# Patient Record
Sex: MALE | Race: WHITE | NOT HISPANIC OR LATINO | Employment: OTHER | URBAN - METROPOLITAN AREA
[De-identification: names, ages, dates, MRNs, and addresses within clinical notes are randomized per-mention and may not be internally consistent; named-entity substitution may affect disease eponyms.]

---

## 2017-01-12 ENCOUNTER — ALLSCRIPTS OFFICE VISIT (OUTPATIENT)
Dept: OTHER | Facility: OTHER | Age: 77
End: 2017-01-12

## 2017-01-12 DIAGNOSIS — J10.1 INFLUENZA DUE TO OTHER IDENTIFIED INFLUENZA VIRUS WITH OTHER RESPIRATORY MANIFESTATIONS: ICD-10-CM

## 2017-01-12 LAB
FLUAV AG SPEC QL IA: POSITIVE
INFLUENZA B AG (HISTORICAL): NEGATIVE

## 2017-01-13 ENCOUNTER — TRANSCRIBE ORDERS (OUTPATIENT)
Dept: RADIOLOGY | Facility: HOSPITAL | Age: 77
End: 2017-01-13

## 2017-01-13 ENCOUNTER — GENERIC CONVERSION - ENCOUNTER (OUTPATIENT)
Dept: OTHER | Facility: OTHER | Age: 77
End: 2017-01-13

## 2017-01-13 ENCOUNTER — ALLSCRIPTS OFFICE VISIT (OUTPATIENT)
Dept: OTHER | Facility: OTHER | Age: 77
End: 2017-01-13

## 2017-01-13 ENCOUNTER — HOSPITAL ENCOUNTER (OUTPATIENT)
Dept: RADIOLOGY | Facility: HOSPITAL | Age: 77
Discharge: HOME | End: 2017-01-13
Payer: COMMERCIAL

## 2017-01-13 DIAGNOSIS — J10.1 INFLUENZA DUE TO OTHER IDENTIFIED INFLUENZA VIRUS WITH OTHER RESPIRATORY MANIFESTATIONS: ICD-10-CM

## 2017-01-13 PROCEDURE — 71020 HB CHEST X-RAY 2VW FRONTAL&LATL: CPT

## 2017-02-02 ENCOUNTER — ALLSCRIPTS OFFICE VISIT (OUTPATIENT)
Dept: OTHER | Facility: OTHER | Age: 77
End: 2017-02-02

## 2017-02-16 ENCOUNTER — GENERIC CONVERSION - ENCOUNTER (OUTPATIENT)
Dept: OTHER | Facility: OTHER | Age: 77
End: 2017-02-16

## 2017-02-16 LAB
LEFT EYE DIABETIC RETINOPATHY: NORMAL
RIGHT EYE DIABETIC RETINOPATHY: NORMAL

## 2017-03-25 ENCOUNTER — APPOINTMENT (EMERGENCY)
Dept: RADIOLOGY | Facility: HOSPITAL | Age: 77
DRG: 041 | End: 2017-03-25
Payer: COMMERCIAL

## 2017-03-25 ENCOUNTER — HOSPITAL ENCOUNTER (INPATIENT)
Facility: HOSPITAL | Age: 77
LOS: 5 days | DRG: 041 | End: 2017-03-31
Attending: EMERGENCY MEDICINE | Admitting: INTERNAL MEDICINE
Payer: COMMERCIAL

## 2017-03-25 DIAGNOSIS — I63.9 CEREBROVASCULAR ACCIDENT (CVA), UNSPECIFIED MECHANISM (HCC): ICD-10-CM

## 2017-03-25 DIAGNOSIS — I63.9 STROKE (HCC): ICD-10-CM

## 2017-03-25 DIAGNOSIS — E11.9 TYPE 2 DIABETES MELLITUS (HCC): ICD-10-CM

## 2017-03-25 DIAGNOSIS — R29.898 RIGHT LEG WEAKNESS: Primary | ICD-10-CM

## 2017-03-25 DIAGNOSIS — R53.1 RIGHT SIDED WEAKNESS: ICD-10-CM

## 2017-03-25 PROBLEM — E87.0 HYPERNATREMIA: Status: ACTIVE | Noted: 2017-03-25

## 2017-03-25 PROBLEM — E78.5 HYPERLIPIDEMIA: Status: ACTIVE | Noted: 2017-03-25

## 2017-03-25 PROBLEM — I25.10 CAD (CORONARY ARTERY DISEASE): Status: ACTIVE | Noted: 2017-03-25

## 2017-03-25 PROBLEM — I10 HYPERTENSION: Status: ACTIVE | Noted: 2017-03-25

## 2017-03-25 LAB
ALBUMIN SERPL BCP-MCNC: 3.6 G/DL (ref 3.5–5)
ALP SERPL-CCNC: 81 U/L (ref 46–116)
ALT SERPL W P-5'-P-CCNC: 31 U/L (ref 12–78)
ANION GAP SERPL CALCULATED.3IONS-SCNC: 6 MMOL/L (ref 4–13)
AST SERPL W P-5'-P-CCNC: 26 U/L (ref 5–45)
ATRIAL RATE: 74 BPM
BASOPHILS # BLD AUTO: 0.03 THOUSANDS/ΜL (ref 0–0.1)
BASOPHILS NFR BLD AUTO: 0 % (ref 0–1)
BILIRUB SERPL-MCNC: 0.93 MG/DL (ref 0.2–1)
BUN SERPL-MCNC: 19 MG/DL (ref 5–25)
CALCIUM SERPL-MCNC: 9.2 MG/DL (ref 8.3–10.1)
CHLORIDE SERPL-SCNC: 110 MMOL/L (ref 100–108)
CO2 SERPL-SCNC: 30 MMOL/L (ref 21–32)
CREAT SERPL-MCNC: 1.16 MG/DL (ref 0.6–1.3)
EOSINOPHIL # BLD AUTO: 0.14 THOUSAND/ΜL (ref 0–0.61)
EOSINOPHIL NFR BLD AUTO: 2 % (ref 0–6)
ERYTHROCYTE [DISTWIDTH] IN BLOOD BY AUTOMATED COUNT: 14.8 % (ref 11.6–15.1)
GFR SERPL CREATININE-BSD FRML MDRD: >60 ML/MIN/1.73SQ M
GLUCOSE SERPL-MCNC: 135 MG/DL (ref 65–140)
GLUCOSE SERPL-MCNC: 162 MG/DL (ref 65–140)
GLUCOSE SERPL-MCNC: 94 MG/DL (ref 65–140)
HCT VFR BLD AUTO: 43.2 % (ref 36.5–49.3)
HGB BLD-MCNC: 14.7 G/DL (ref 12–17)
LIPASE SERPL-CCNC: 57 U/L (ref 73–393)
LYMPHOCYTES # BLD AUTO: 2.06 THOUSANDS/ΜL (ref 0.6–4.47)
LYMPHOCYTES NFR BLD AUTO: 24 % (ref 14–44)
MCH RBC QN AUTO: 28.6 PG (ref 26.8–34.3)
MCHC RBC AUTO-ENTMCNC: 34 G/DL (ref 31.4–37.4)
MCV RBC AUTO: 84 FL (ref 82–98)
MONOCYTES # BLD AUTO: 0.59 THOUSAND/ΜL (ref 0.17–1.22)
MONOCYTES NFR BLD AUTO: 7 % (ref 4–12)
NEUTROPHILS # BLD AUTO: 5.82 THOUSANDS/ΜL (ref 1.85–7.62)
NEUTS SEG NFR BLD AUTO: 67 % (ref 43–75)
NRBC BLD AUTO-RTO: 0 /100 WBCS
NT-PROBNP SERPL-MCNC: 605 PG/ML
P AXIS: 76 DEGREES
PLATELET # BLD AUTO: 235 THOUSANDS/UL (ref 149–390)
PMV BLD AUTO: 11.2 FL (ref 8.9–12.7)
POTASSIUM SERPL-SCNC: 4 MMOL/L (ref 3.5–5.3)
PROT SERPL-MCNC: 7.4 G/DL (ref 6.4–8.2)
QRS AXIS: -30 DEGREES
QRSD INTERVAL: 120 MS
QT INTERVAL: 396 MS
QTC INTERVAL: 439 MS
RBC # BLD AUTO: 5.14 MILLION/UL (ref 3.88–5.62)
SODIUM SERPL-SCNC: 146 MMOL/L (ref 136–145)
SPECIMEN SOURCE: NORMAL
T WAVE AXIS: 52 DEGREES
TROPONIN I BLD-MCNC: 0.03 NG/ML (ref 0–0.08)
VENTRICULAR RATE: 74 BPM
WBC # BLD AUTO: 8.66 THOUSAND/UL (ref 4.31–10.16)

## 2017-03-25 PROCEDURE — 80053 COMPREHEN METABOLIC PANEL: CPT | Performed by: EMERGENCY MEDICINE

## 2017-03-25 PROCEDURE — 93005 ELECTROCARDIOGRAM TRACING: CPT | Performed by: EMERGENCY MEDICINE

## 2017-03-25 PROCEDURE — 84484 ASSAY OF TROPONIN QUANT: CPT

## 2017-03-25 PROCEDURE — 82948 REAGENT STRIP/BLOOD GLUCOSE: CPT

## 2017-03-25 PROCEDURE — 83880 ASSAY OF NATRIURETIC PEPTIDE: CPT | Performed by: EMERGENCY MEDICINE

## 2017-03-25 PROCEDURE — 99285 EMERGENCY DEPT VISIT HI MDM: CPT

## 2017-03-25 PROCEDURE — 85025 COMPLETE CBC W/AUTO DIFF WBC: CPT | Performed by: EMERGENCY MEDICINE

## 2017-03-25 PROCEDURE — 70450 CT HEAD/BRAIN W/O DYE: CPT

## 2017-03-25 PROCEDURE — A9270 NON-COVERED ITEM OR SERVICE: HCPCS | Performed by: EMERGENCY MEDICINE

## 2017-03-25 PROCEDURE — A9270 NON-COVERED ITEM OR SERVICE: HCPCS | Performed by: FAMILY MEDICINE

## 2017-03-25 PROCEDURE — 83690 ASSAY OF LIPASE: CPT | Performed by: EMERGENCY MEDICINE

## 2017-03-25 PROCEDURE — 71020 HB CHEST X-RAY 2VW FRONTAL&LATL: CPT

## 2017-03-25 PROCEDURE — 36415 COLL VENOUS BLD VENIPUNCTURE: CPT | Performed by: EMERGENCY MEDICINE

## 2017-03-25 RX ORDER — CHOLECALCIFEROL (VITAMIN D3) 125 MCG
1000 CAPSULE ORAL DAILY
Status: DISCONTINUED | OUTPATIENT
Start: 2017-03-26 | End: 2017-03-31 | Stop reason: HOSPADM

## 2017-03-25 RX ORDER — METOPROLOL TARTRATE 50 MG/1
TABLET, FILM COATED ORAL
COMMUNITY
Start: 2016-11-14 | End: 2017-04-07 | Stop reason: HOSPADM

## 2017-03-25 RX ORDER — LORATADINE 10 MG/1
5 TABLET ORAL DAILY
Status: DISCONTINUED | OUTPATIENT
Start: 2017-03-26 | End: 2017-03-31 | Stop reason: HOSPADM

## 2017-03-25 RX ORDER — ASPIRIN 81 MG/1
162 TABLET, CHEWABLE ORAL ONCE
Status: COMPLETED | OUTPATIENT
Start: 2017-03-25 | End: 2017-03-25

## 2017-03-25 RX ORDER — LORATADINE 10 MG/1
TABLET ORAL
COMMUNITY
Start: 2015-01-07 | End: 2017-04-07 | Stop reason: HOSPADM

## 2017-03-25 RX ORDER — ONDANSETRON 2 MG/ML
4 INJECTION INTRAMUSCULAR; INTRAVENOUS EVERY 6 HOURS PRN
Status: DISCONTINUED | OUTPATIENT
Start: 2017-03-25 | End: 2017-03-31 | Stop reason: HOSPADM

## 2017-03-25 RX ORDER — SODIUM CHLORIDE, SODIUM LACTATE, POTASSIUM CHLORIDE, CALCIUM CHLORIDE 600; 310; 30; 20 MG/100ML; MG/100ML; MG/100ML; MG/100ML
75 INJECTION, SOLUTION INTRAVENOUS CONTINUOUS
Status: DISCONTINUED | OUTPATIENT
Start: 2017-03-25 | End: 2017-03-27

## 2017-03-25 RX ORDER — ATORVASTATIN CALCIUM 20 MG/1
10 TABLET, FILM COATED ORAL
COMMUNITY
End: 2017-04-07 | Stop reason: HOSPADM

## 2017-03-25 RX ORDER — LISINOPRIL 2.5 MG/1
TABLET ORAL
COMMUNITY
Start: 2016-10-17 | End: 2017-04-07 | Stop reason: HOSPADM

## 2017-03-25 RX ORDER — ATORVASTATIN CALCIUM 10 MG/1
10 TABLET, FILM COATED ORAL
Status: DISCONTINUED | OUTPATIENT
Start: 2017-03-25 | End: 2017-03-26

## 2017-03-25 RX ORDER — ACETAMINOPHEN 325 MG/1
650 TABLET ORAL EVERY 4 HOURS PRN
Status: DISCONTINUED | OUTPATIENT
Start: 2017-03-25 | End: 2017-03-31 | Stop reason: HOSPADM

## 2017-03-25 RX ORDER — HYDRALAZINE HYDROCHLORIDE 20 MG/ML
5 INJECTION INTRAMUSCULAR; INTRAVENOUS EVERY 6 HOURS PRN
Status: DISCONTINUED | OUTPATIENT
Start: 2017-03-25 | End: 2017-03-31 | Stop reason: HOSPADM

## 2017-03-25 RX ORDER — SIMETHICONE 80 MG
80 TABLET,CHEWABLE ORAL 4 TIMES DAILY PRN
Status: DISCONTINUED | OUTPATIENT
Start: 2017-03-25 | End: 2017-03-31 | Stop reason: HOSPADM

## 2017-03-25 RX ORDER — ASPIRIN 81 MG/1
81 TABLET, CHEWABLE ORAL DAILY
Status: DISCONTINUED | OUTPATIENT
Start: 2017-03-26 | End: 2017-03-27

## 2017-03-25 RX ORDER — METOPROLOL TARTRATE 50 MG/1
50 TABLET, FILM COATED ORAL EVERY 12 HOURS SCHEDULED
Status: DISCONTINUED | OUTPATIENT
Start: 2017-03-25 | End: 2017-03-31 | Stop reason: HOSPADM

## 2017-03-25 RX ADMIN — METOPROLOL TARTRATE 50 MG: 50 TABLET ORAL at 21:07

## 2017-03-25 RX ADMIN — ASPIRIN 162 MG: 81 TABLET, CHEWABLE ORAL at 14:30

## 2017-03-25 RX ADMIN — SODIUM CHLORIDE, SODIUM LACTATE, POTASSIUM CHLORIDE, AND CALCIUM CHLORIDE 75 ML/HR: .6; .31; .03; .02 INJECTION, SOLUTION INTRAVENOUS at 18:30

## 2017-03-25 RX ADMIN — INSULIN LISPRO 1 UNITS: 100 INJECTION, SOLUTION INTRAVENOUS; SUBCUTANEOUS at 21:11

## 2017-03-25 RX ADMIN — HYDRALAZINE HYDROCHLORIDE 5 MG: 20 INJECTION INTRAMUSCULAR; INTRAVENOUS at 23:53

## 2017-03-25 RX ADMIN — SODIUM CHLORIDE, SODIUM LACTATE, POTASSIUM CHLORIDE, AND CALCIUM CHLORIDE 1000 ML: .6; .31; .03; .02 INJECTION, SOLUTION INTRAVENOUS at 17:03

## 2017-03-25 RX ADMIN — SODIUM CHLORIDE 500 ML: 0.9 INJECTION, SOLUTION INTRAVENOUS at 12:50

## 2017-03-26 ENCOUNTER — APPOINTMENT (OUTPATIENT)
Dept: RADIOLOGY | Facility: HOSPITAL | Age: 77
DRG: 041 | End: 2017-03-26
Payer: COMMERCIAL

## 2017-03-26 ENCOUNTER — APPOINTMENT (INPATIENT)
Dept: RADIOLOGY | Facility: HOSPITAL | Age: 77
DRG: 041 | End: 2017-03-26
Payer: COMMERCIAL

## 2017-03-26 LAB
ALBUMIN SERPL BCP-MCNC: 3.1 G/DL (ref 3.5–5)
ALP SERPL-CCNC: 70 U/L (ref 46–116)
ALT SERPL W P-5'-P-CCNC: 25 U/L (ref 12–78)
ANION GAP SERPL CALCULATED.3IONS-SCNC: 7 MMOL/L (ref 4–13)
AST SERPL W P-5'-P-CCNC: 17 U/L (ref 5–45)
BILIRUB SERPL-MCNC: 0.9 MG/DL (ref 0.2–1)
BUN SERPL-MCNC: 14 MG/DL (ref 5–25)
CALCIUM SERPL-MCNC: 8.8 MG/DL (ref 8.3–10.1)
CHLORIDE SERPL-SCNC: 110 MMOL/L (ref 100–108)
CHOLEST SERPL-MCNC: 119 MG/DL (ref 50–200)
CO2 SERPL-SCNC: 29 MMOL/L (ref 21–32)
CREAT SERPL-MCNC: 0.86 MG/DL (ref 0.6–1.3)
ERYTHROCYTE [DISTWIDTH] IN BLOOD BY AUTOMATED COUNT: 14.7 % (ref 11.6–15.1)
GFR SERPL CREATININE-BSD FRML MDRD: >60 ML/MIN/1.73SQ M
GLUCOSE SERPL-MCNC: 116 MG/DL (ref 65–140)
GLUCOSE SERPL-MCNC: 117 MG/DL (ref 65–140)
GLUCOSE SERPL-MCNC: 145 MG/DL (ref 65–140)
GLUCOSE SERPL-MCNC: 158 MG/DL (ref 65–140)
GLUCOSE SERPL-MCNC: 195 MG/DL (ref 65–140)
GLUCOSE SERPL-MCNC: 218 MG/DL (ref 65–140)
HCT VFR BLD AUTO: 39.9 % (ref 36.5–49.3)
HDLC SERPL-MCNC: 31 MG/DL (ref 40–60)
HGB BLD-MCNC: 13.5 G/DL (ref 12–17)
LDLC SERPL CALC-MCNC: 69 MG/DL (ref 0–100)
MAGNESIUM SERPL-MCNC: 1.7 MG/DL (ref 1.6–2.6)
MCH RBC QN AUTO: 28.1 PG (ref 26.8–34.3)
MCHC RBC AUTO-ENTMCNC: 33.8 G/DL (ref 31.4–37.4)
MCV RBC AUTO: 83 FL (ref 82–98)
PLATELET # BLD AUTO: 161 THOUSANDS/UL (ref 149–390)
PMV BLD AUTO: 10.5 FL (ref 8.9–12.7)
POTASSIUM SERPL-SCNC: 3.7 MMOL/L (ref 3.5–5.3)
PROT SERPL-MCNC: 6.3 G/DL (ref 6.4–8.2)
RBC # BLD AUTO: 4.81 MILLION/UL (ref 3.88–5.62)
SODIUM SERPL-SCNC: 146 MMOL/L (ref 136–145)
TRIGL SERPL-MCNC: 95 MG/DL
WBC # BLD AUTO: 6.47 THOUSAND/UL (ref 4.31–10.16)

## 2017-03-26 PROCEDURE — 83735 ASSAY OF MAGNESIUM: CPT | Performed by: FAMILY MEDICINE

## 2017-03-26 PROCEDURE — G8997 SWALLOW GOAL STATUS: HCPCS

## 2017-03-26 PROCEDURE — 92610 EVALUATE SWALLOWING FUNCTION: CPT

## 2017-03-26 PROCEDURE — A9270 NON-COVERED ITEM OR SERVICE: HCPCS | Performed by: PHYSICIAN ASSISTANT

## 2017-03-26 PROCEDURE — 70496 CT ANGIOGRAPHY HEAD: CPT

## 2017-03-26 PROCEDURE — G8998 SWALLOW D/C STATUS: HCPCS

## 2017-03-26 PROCEDURE — A9270 NON-COVERED ITEM OR SERVICE: HCPCS | Performed by: PSYCHIATRY & NEUROLOGY

## 2017-03-26 PROCEDURE — 82948 REAGENT STRIP/BLOOD GLUCOSE: CPT

## 2017-03-26 PROCEDURE — 85027 COMPLETE CBC AUTOMATED: CPT | Performed by: FAMILY MEDICINE

## 2017-03-26 PROCEDURE — 93880 EXTRACRANIAL BILAT STUDY: CPT

## 2017-03-26 PROCEDURE — A9270 NON-COVERED ITEM OR SERVICE: HCPCS | Performed by: FAMILY MEDICINE

## 2017-03-26 PROCEDURE — 80053 COMPREHEN METABOLIC PANEL: CPT | Performed by: FAMILY MEDICINE

## 2017-03-26 PROCEDURE — 80061 LIPID PANEL: CPT | Performed by: FAMILY MEDICINE

## 2017-03-26 PROCEDURE — G8996 SWALLOW CURRENT STATUS: HCPCS

## 2017-03-26 RX ORDER — ATORVASTATIN CALCIUM 40 MG/1
40 TABLET, FILM COATED ORAL
Status: DISCONTINUED | OUTPATIENT
Start: 2017-03-26 | End: 2017-03-31 | Stop reason: HOSPADM

## 2017-03-26 RX ADMIN — INSULIN HUMAN 20 UNITS: 100 INJECTION, SUSPENSION SUBCUTANEOUS at 10:59

## 2017-03-26 RX ADMIN — ASPIRIN 81 MG: 81 TABLET, CHEWABLE ORAL at 08:47

## 2017-03-26 RX ADMIN — ENOXAPARIN SODIUM 40 MG: 40 INJECTION SUBCUTANEOUS at 08:48

## 2017-03-26 RX ADMIN — SODIUM CHLORIDE, SODIUM LACTATE, POTASSIUM CHLORIDE, AND CALCIUM CHLORIDE 75 ML/HR: .6; .31; .03; .02 INJECTION, SOLUTION INTRAVENOUS at 21:06

## 2017-03-26 RX ADMIN — CYANOCOBALAMIN TAB 500 MCG 1000 MCG: 500 TAB at 08:48

## 2017-03-26 RX ADMIN — ATORVASTATIN CALCIUM 40 MG: 40 TABLET, FILM COATED ORAL at 17:48

## 2017-03-26 RX ADMIN — INSULIN LISPRO 5 UNITS: 100 INJECTION, SOLUTION INTRAVENOUS; SUBCUTANEOUS at 13:37

## 2017-03-26 RX ADMIN — INSULIN LISPRO 1 UNITS: 100 INJECTION, SOLUTION INTRAVENOUS; SUBCUTANEOUS at 22:45

## 2017-03-26 RX ADMIN — INSULIN LISPRO 4 UNITS: 100 INJECTION, SOLUTION INTRAVENOUS; SUBCUTANEOUS at 13:38

## 2017-03-26 RX ADMIN — METOPROLOL TARTRATE 50 MG: 50 TABLET ORAL at 08:48

## 2017-03-26 RX ADMIN — SODIUM CHLORIDE, SODIUM LACTATE, POTASSIUM CHLORIDE, AND CALCIUM CHLORIDE 75 ML/HR: .6; .31; .03; .02 INJECTION, SOLUTION INTRAVENOUS at 07:16

## 2017-03-26 RX ADMIN — INSULIN HUMAN 25 UNITS: 100 INJECTION, SUSPENSION SUBCUTANEOUS at 17:57

## 2017-03-26 RX ADMIN — INSULIN LISPRO 2 UNITS: 100 INJECTION, SOLUTION INTRAVENOUS; SUBCUTANEOUS at 17:55

## 2017-03-26 RX ADMIN — INSULIN LISPRO 5 UNITS: 100 INJECTION, SOLUTION INTRAVENOUS; SUBCUTANEOUS at 17:56

## 2017-03-26 RX ADMIN — METOPROLOL TARTRATE 50 MG: 50 TABLET ORAL at 21:06

## 2017-03-26 RX ADMIN — LORATADINE 5 MG: 10 TABLET ORAL at 21:06

## 2017-03-26 RX ADMIN — IOHEXOL 85 ML: 350 INJECTION, SOLUTION INTRAVENOUS at 15:00

## 2017-03-27 ENCOUNTER — APPOINTMENT (INPATIENT)
Dept: PHYSICAL THERAPY | Facility: HOSPITAL | Age: 77
DRG: 041 | End: 2017-03-27
Payer: COMMERCIAL

## 2017-03-27 ENCOUNTER — APPOINTMENT (INPATIENT)
Dept: RADIOLOGY | Facility: HOSPITAL | Age: 77
DRG: 041 | End: 2017-03-27
Payer: COMMERCIAL

## 2017-03-27 ENCOUNTER — APPOINTMENT (INPATIENT)
Dept: NON INVASIVE DIAGNOSTICS | Facility: HOSPITAL | Age: 77
DRG: 041 | End: 2017-03-27
Payer: COMMERCIAL

## 2017-03-27 PROBLEM — I65.29 CAROTID STENOSIS: Status: ACTIVE | Noted: 2017-03-27

## 2017-03-27 PROBLEM — I63.9 CVA (CEREBRAL VASCULAR ACCIDENT) (HCC): Status: ACTIVE | Noted: 2017-03-27

## 2017-03-27 LAB
ANION GAP SERPL CALCULATED.3IONS-SCNC: 7 MMOL/L (ref 4–13)
BASOPHILS # BLD AUTO: 0.01 THOUSANDS/ΜL (ref 0–0.1)
BASOPHILS NFR BLD AUTO: 0 % (ref 0–1)
BUN SERPL-MCNC: 16 MG/DL (ref 5–25)
CALCIUM SERPL-MCNC: 9.1 MG/DL (ref 8.3–10.1)
CHLORIDE SERPL-SCNC: 107 MMOL/L (ref 100–108)
CO2 SERPL-SCNC: 32 MMOL/L (ref 21–32)
CREAT SERPL-MCNC: 1.04 MG/DL (ref 0.6–1.3)
EOSINOPHIL # BLD AUTO: 0.18 THOUSAND/ΜL (ref 0–0.61)
EOSINOPHIL NFR BLD AUTO: 2 % (ref 0–6)
ERYTHROCYTE [DISTWIDTH] IN BLOOD BY AUTOMATED COUNT: 14.8 % (ref 11.6–15.1)
EST. AVERAGE GLUCOSE BLD GHB EST-MCNC: 131 MG/DL
GFR SERPL CREATININE-BSD FRML MDRD: >60 ML/MIN/1.73SQ M
GLUCOSE SERPL-MCNC: 219 MG/DL (ref 65–140)
GLUCOSE SERPL-MCNC: 239 MG/DL (ref 65–140)
GLUCOSE SERPL-MCNC: 86 MG/DL (ref 65–140)
GLUCOSE SERPL-MCNC: 98 MG/DL (ref 65–140)
HBA1C MFR BLD: 6.2 % (ref 4.2–6.3)
HCT VFR BLD AUTO: 40 % (ref 36.5–49.3)
HGB BLD-MCNC: 13.6 G/DL (ref 12–17)
LYMPHOCYTES # BLD AUTO: 1.49 THOUSANDS/ΜL (ref 0.6–4.47)
LYMPHOCYTES NFR BLD AUTO: 20 % (ref 14–44)
MCH RBC QN AUTO: 28.1 PG (ref 26.8–34.3)
MCHC RBC AUTO-ENTMCNC: 34 G/DL (ref 31.4–37.4)
MCV RBC AUTO: 83 FL (ref 82–98)
MONOCYTES # BLD AUTO: 0.72 THOUSAND/ΜL (ref 0.17–1.22)
MONOCYTES NFR BLD AUTO: 10 % (ref 4–12)
NEUTROPHILS # BLD AUTO: 5.19 THOUSANDS/ΜL (ref 1.85–7.62)
NEUTS SEG NFR BLD AUTO: 68 % (ref 43–75)
NRBC BLD AUTO-RTO: 0 /100 WBCS
PA ADP BLD-ACNC: 567 ARU
PLATELET # BLD AUTO: 192 THOUSANDS/UL (ref 149–390)
PMV BLD AUTO: 10.7 FL (ref 8.9–12.7)
POTASSIUM SERPL-SCNC: 3.5 MMOL/L (ref 3.5–5.3)
RBC # BLD AUTO: 4.84 MILLION/UL (ref 3.88–5.62)
SODIUM SERPL-SCNC: 146 MMOL/L (ref 136–145)
WBC # BLD AUTO: 7.61 THOUSAND/UL (ref 4.31–10.16)

## 2017-03-27 PROCEDURE — 83036 HEMOGLOBIN GLYCOSYLATED A1C: CPT | Performed by: INTERNAL MEDICINE

## 2017-03-27 PROCEDURE — A9270 NON-COVERED ITEM OR SERVICE: HCPCS | Performed by: FAMILY MEDICINE

## 2017-03-27 PROCEDURE — G8978 MOBILITY CURRENT STATUS: HCPCS

## 2017-03-27 PROCEDURE — 82948 REAGENT STRIP/BLOOD GLUCOSE: CPT

## 2017-03-27 PROCEDURE — G8988 SELF CARE GOAL STATUS: HCPCS

## 2017-03-27 PROCEDURE — 97162 PT EVAL MOD COMPLEX 30 MIN: CPT

## 2017-03-27 PROCEDURE — 70498 CT ANGIOGRAPHY NECK: CPT

## 2017-03-27 PROCEDURE — 97166 OT EVAL MOD COMPLEX 45 MIN: CPT

## 2017-03-27 PROCEDURE — 85576 BLOOD PLATELET AGGREGATION: CPT | Performed by: INTERNAL MEDICINE

## 2017-03-27 PROCEDURE — 93306 TTE W/DOPPLER COMPLETE: CPT

## 2017-03-27 PROCEDURE — A9270 NON-COVERED ITEM OR SERVICE: HCPCS | Performed by: PHYSICIAN ASSISTANT

## 2017-03-27 PROCEDURE — 97112 NEUROMUSCULAR REEDUCATION: CPT

## 2017-03-27 PROCEDURE — 85025 COMPLETE CBC W/AUTO DIFF WBC: CPT | Performed by: PHYSICIAN ASSISTANT

## 2017-03-27 PROCEDURE — G8979 MOBILITY GOAL STATUS: HCPCS

## 2017-03-27 PROCEDURE — G8987 SELF CARE CURRENT STATUS: HCPCS

## 2017-03-27 PROCEDURE — A9270 NON-COVERED ITEM OR SERVICE: HCPCS | Performed by: PSYCHIATRY & NEUROLOGY

## 2017-03-27 PROCEDURE — 80048 BASIC METABOLIC PNL TOTAL CA: CPT | Performed by: PHYSICIAN ASSISTANT

## 2017-03-27 PROCEDURE — 70450 CT HEAD/BRAIN W/O DYE: CPT

## 2017-03-27 RX ORDER — ASPIRIN 325 MG
325 TABLET ORAL DAILY
Status: DISCONTINUED | OUTPATIENT
Start: 2017-03-28 | End: 2017-03-31 | Stop reason: HOSPADM

## 2017-03-27 RX ORDER — SODIUM CHLORIDE 450 MG/100ML
75 INJECTION, SOLUTION INTRAVENOUS CONTINUOUS
Status: DISCONTINUED | OUTPATIENT
Start: 2017-03-27 | End: 2017-03-29

## 2017-03-27 RX ADMIN — INSULIN LISPRO 5 UNITS: 100 INJECTION, SOLUTION INTRAVENOUS; SUBCUTANEOUS at 17:53

## 2017-03-27 RX ADMIN — INSULIN HUMAN 25 UNITS: 100 INJECTION, SUSPENSION SUBCUTANEOUS at 17:54

## 2017-03-27 RX ADMIN — INSULIN LISPRO 1 UNITS: 100 INJECTION, SOLUTION INTRAVENOUS; SUBCUTANEOUS at 21:59

## 2017-03-27 RX ADMIN — IOHEXOL 85 ML: 350 INJECTION, SOLUTION INTRAVENOUS at 15:09

## 2017-03-27 RX ADMIN — ENOXAPARIN SODIUM 40 MG: 40 INJECTION SUBCUTANEOUS at 09:56

## 2017-03-27 RX ADMIN — INSULIN LISPRO 4 UNITS: 100 INJECTION, SOLUTION INTRAVENOUS; SUBCUTANEOUS at 17:53

## 2017-03-27 RX ADMIN — SODIUM CHLORIDE 75 ML/HR: 0.45 INJECTION, SOLUTION INTRAVENOUS at 23:38

## 2017-03-27 RX ADMIN — ASPIRIN 81 MG: 81 TABLET, CHEWABLE ORAL at 09:55

## 2017-03-27 RX ADMIN — ATORVASTATIN CALCIUM 40 MG: 40 TABLET, FILM COATED ORAL at 17:52

## 2017-03-27 RX ADMIN — METOPROLOL TARTRATE 50 MG: 50 TABLET ORAL at 09:55

## 2017-03-27 RX ADMIN — INSULIN LISPRO 4 UNITS: 100 INJECTION, SOLUTION INTRAVENOUS; SUBCUTANEOUS at 13:39

## 2017-03-27 RX ADMIN — CYANOCOBALAMIN TAB 500 MCG 1000 MCG: 500 TAB at 09:56

## 2017-03-27 RX ADMIN — LORATADINE 5 MG: 10 TABLET ORAL at 22:03

## 2017-03-27 RX ADMIN — SODIUM CHLORIDE 75 ML/HR: 0.45 INJECTION, SOLUTION INTRAVENOUS at 12:35

## 2017-03-27 RX ADMIN — SODIUM CHLORIDE, SODIUM LACTATE, POTASSIUM CHLORIDE, AND CALCIUM CHLORIDE 75 ML/HR: .6; .31; .03; .02 INJECTION, SOLUTION INTRAVENOUS at 09:58

## 2017-03-28 ENCOUNTER — APPOINTMENT (INPATIENT)
Dept: RADIOLOGY | Facility: HOSPITAL | Age: 77
DRG: 041 | End: 2017-03-28
Payer: COMMERCIAL

## 2017-03-28 ENCOUNTER — APPOINTMENT (INPATIENT)
Dept: PHYSICAL THERAPY | Facility: HOSPITAL | Age: 77
DRG: 041 | End: 2017-03-28
Payer: COMMERCIAL

## 2017-03-28 LAB
ANION GAP SERPL CALCULATED.3IONS-SCNC: 7 MMOL/L (ref 4–13)
BUN SERPL-MCNC: 16 MG/DL (ref 5–25)
CALCIUM SERPL-MCNC: 8.8 MG/DL (ref 8.3–10.1)
CHLORIDE SERPL-SCNC: 108 MMOL/L (ref 100–108)
CO2 SERPL-SCNC: 30 MMOL/L (ref 21–32)
CREAT SERPL-MCNC: 0.96 MG/DL (ref 0.6–1.3)
GFR SERPL CREATININE-BSD FRML MDRD: >60 ML/MIN/1.73SQ M
GLUCOSE SERPL-MCNC: 106 MG/DL (ref 65–140)
GLUCOSE SERPL-MCNC: 153 MG/DL (ref 65–140)
GLUCOSE SERPL-MCNC: 163 MG/DL (ref 65–140)
GLUCOSE SERPL-MCNC: 194 MG/DL (ref 65–140)
GLUCOSE SERPL-MCNC: 283 MG/DL (ref 65–140)
GLUCOSE SERPL-MCNC: 96 MG/DL (ref 65–140)
POTASSIUM SERPL-SCNC: 3.3 MMOL/L (ref 3.5–5.3)
SODIUM SERPL-SCNC: 145 MMOL/L (ref 136–145)

## 2017-03-28 PROCEDURE — A9270 NON-COVERED ITEM OR SERVICE: HCPCS | Performed by: PSYCHIATRY & NEUROLOGY

## 2017-03-28 PROCEDURE — A9270 NON-COVERED ITEM OR SERVICE: HCPCS | Performed by: PHYSICIAN ASSISTANT

## 2017-03-28 PROCEDURE — 82948 REAGENT STRIP/BLOOD GLUCOSE: CPT

## 2017-03-28 PROCEDURE — 97530 THERAPEUTIC ACTIVITIES: CPT | Performed by: STUDENT IN AN ORGANIZED HEALTH CARE EDUCATION/TRAINING PROGRAM

## 2017-03-28 PROCEDURE — 72141 MRI NECK SPINE W/O DYE: CPT

## 2017-03-28 PROCEDURE — 97535 SELF CARE MNGMENT TRAINING: CPT | Performed by: STUDENT IN AN ORGANIZED HEALTH CARE EDUCATION/TRAINING PROGRAM

## 2017-03-28 PROCEDURE — A9270 NON-COVERED ITEM OR SERVICE: HCPCS | Performed by: FAMILY MEDICINE

## 2017-03-28 PROCEDURE — 97116 GAIT TRAINING THERAPY: CPT

## 2017-03-28 PROCEDURE — 70551 MRI BRAIN STEM W/O DYE: CPT

## 2017-03-28 PROCEDURE — 97110 THERAPEUTIC EXERCISES: CPT

## 2017-03-28 PROCEDURE — 80048 BASIC METABOLIC PNL TOTAL CA: CPT | Performed by: PHYSICIAN ASSISTANT

## 2017-03-28 PROCEDURE — 97110 THERAPEUTIC EXERCISES: CPT | Performed by: STUDENT IN AN ORGANIZED HEALTH CARE EDUCATION/TRAINING PROGRAM

## 2017-03-28 RX ORDER — LORAZEPAM 2 MG/ML
1 INJECTION INTRAMUSCULAR ONCE
Status: COMPLETED | OUTPATIENT
Start: 2017-03-28 | End: 2017-03-28

## 2017-03-28 RX ORDER — LISINOPRIL 2.5 MG/1
2.5 TABLET ORAL DAILY
Status: DISCONTINUED | OUTPATIENT
Start: 2017-03-28 | End: 2017-03-30

## 2017-03-28 RX ORDER — POTASSIUM CHLORIDE 20 MEQ/1
40 TABLET, EXTENDED RELEASE ORAL ONCE
Status: COMPLETED | OUTPATIENT
Start: 2017-03-28 | End: 2017-03-28

## 2017-03-28 RX ADMIN — LISINOPRIL 2.5 MG: 2.5 TABLET ORAL at 14:06

## 2017-03-28 RX ADMIN — POTASSIUM CHLORIDE 40 MEQ: 1500 TABLET, EXTENDED RELEASE ORAL at 13:11

## 2017-03-28 RX ADMIN — METOPROLOL TARTRATE 50 MG: 50 TABLET ORAL at 21:16

## 2017-03-28 RX ADMIN — METOPROLOL TARTRATE 50 MG: 50 TABLET ORAL at 09:03

## 2017-03-28 RX ADMIN — LORATADINE 5 MG: 10 TABLET ORAL at 21:16

## 2017-03-28 RX ADMIN — LORAZEPAM 1 MG: 2 INJECTION, SOLUTION INTRAMUSCULAR; INTRAVENOUS at 16:43

## 2017-03-28 RX ADMIN — INSULIN LISPRO 1 UNITS: 100 INJECTION, SOLUTION INTRAVENOUS; SUBCUTANEOUS at 21:15

## 2017-03-28 RX ADMIN — INSULIN LISPRO 5 UNITS: 100 INJECTION, SOLUTION INTRAVENOUS; SUBCUTANEOUS at 09:05

## 2017-03-28 RX ADMIN — CYANOCOBALAMIN TAB 500 MCG 1000 MCG: 500 TAB at 09:03

## 2017-03-28 RX ADMIN — ENOXAPARIN SODIUM 40 MG: 40 INJECTION SUBCUTANEOUS at 09:03

## 2017-03-28 RX ADMIN — INSULIN LISPRO 5 UNITS: 100 INJECTION, SOLUTION INTRAVENOUS; SUBCUTANEOUS at 14:10

## 2017-03-28 RX ADMIN — HYDRALAZINE HYDROCHLORIDE 5 MG: 20 INJECTION INTRAMUSCULAR; INTRAVENOUS at 01:25

## 2017-03-28 RX ADMIN — ASPIRIN 325 MG: 325 TABLET ORAL at 09:03

## 2017-03-28 RX ADMIN — INSULIN LISPRO 6 UNITS: 100 INJECTION, SOLUTION INTRAVENOUS; SUBCUTANEOUS at 14:09

## 2017-03-28 RX ADMIN — ATORVASTATIN CALCIUM 40 MG: 40 TABLET, FILM COATED ORAL at 16:46

## 2017-03-29 ENCOUNTER — APPOINTMENT (INPATIENT)
Dept: PHYSICAL THERAPY | Facility: HOSPITAL | Age: 77
DRG: 041 | End: 2017-03-29
Payer: COMMERCIAL

## 2017-03-29 LAB
ANION GAP SERPL CALCULATED.3IONS-SCNC: 6 MMOL/L (ref 4–13)
BUN SERPL-MCNC: 14 MG/DL (ref 5–25)
CALCIUM SERPL-MCNC: 8.9 MG/DL (ref 8.3–10.1)
CHLORIDE SERPL-SCNC: 108 MMOL/L (ref 100–108)
CO2 SERPL-SCNC: 31 MMOL/L (ref 21–32)
CREAT SERPL-MCNC: 1.03 MG/DL (ref 0.6–1.3)
GFR SERPL CREATININE-BSD FRML MDRD: >60 ML/MIN/1.73SQ M
GLUCOSE SERPL-MCNC: 153 MG/DL (ref 65–140)
GLUCOSE SERPL-MCNC: 165 MG/DL (ref 65–140)
GLUCOSE SERPL-MCNC: 215 MG/DL (ref 65–140)
GLUCOSE SERPL-MCNC: 230 MG/DL (ref 65–140)
GLUCOSE SERPL-MCNC: 236 MG/DL (ref 65–140)
POTASSIUM SERPL-SCNC: 3.7 MMOL/L (ref 3.5–5.3)
SODIUM SERPL-SCNC: 145 MMOL/L (ref 136–145)

## 2017-03-29 PROCEDURE — 93005 ELECTROCARDIOGRAM TRACING: CPT

## 2017-03-29 PROCEDURE — 80048 BASIC METABOLIC PNL TOTAL CA: CPT | Performed by: PHYSICIAN ASSISTANT

## 2017-03-29 PROCEDURE — 97116 GAIT TRAINING THERAPY: CPT

## 2017-03-29 PROCEDURE — A9270 NON-COVERED ITEM OR SERVICE: HCPCS | Performed by: PSYCHIATRY & NEUROLOGY

## 2017-03-29 PROCEDURE — 93005 ELECTROCARDIOGRAM TRACING: CPT | Performed by: PHYSICIAN ASSISTANT

## 2017-03-29 PROCEDURE — A9270 NON-COVERED ITEM OR SERVICE: HCPCS | Performed by: FAMILY MEDICINE

## 2017-03-29 PROCEDURE — 97110 THERAPEUTIC EXERCISES: CPT

## 2017-03-29 PROCEDURE — A9270 NON-COVERED ITEM OR SERVICE: HCPCS | Performed by: PHYSICIAN ASSISTANT

## 2017-03-29 PROCEDURE — 82948 REAGENT STRIP/BLOOD GLUCOSE: CPT

## 2017-03-29 RX ADMIN — LISINOPRIL 2.5 MG: 2.5 TABLET ORAL at 08:15

## 2017-03-29 RX ADMIN — ATORVASTATIN CALCIUM 40 MG: 40 TABLET, FILM COATED ORAL at 17:43

## 2017-03-29 RX ADMIN — INSULIN HUMAN 25 UNITS: 100 INJECTION, SUSPENSION SUBCUTANEOUS at 17:41

## 2017-03-29 RX ADMIN — METOPROLOL TARTRATE 50 MG: 50 TABLET ORAL at 08:15

## 2017-03-29 RX ADMIN — CYANOCOBALAMIN TAB 500 MCG 1000 MCG: 500 TAB at 08:15

## 2017-03-29 RX ADMIN — INSULIN LISPRO 4 UNITS: 100 INJECTION, SOLUTION INTRAVENOUS; SUBCUTANEOUS at 17:39

## 2017-03-29 RX ADMIN — INSULIN LISPRO 7 UNITS: 100 INJECTION, SOLUTION INTRAVENOUS; SUBCUTANEOUS at 09:18

## 2017-03-29 RX ADMIN — INSULIN LISPRO 4 UNITS: 100 INJECTION, SOLUTION INTRAVENOUS; SUBCUTANEOUS at 13:47

## 2017-03-29 RX ADMIN — HYDRALAZINE HYDROCHLORIDE 5 MG: 20 INJECTION INTRAMUSCULAR; INTRAVENOUS at 20:06

## 2017-03-29 RX ADMIN — METOPROLOL TARTRATE 50 MG: 50 TABLET ORAL at 21:23

## 2017-03-29 RX ADMIN — SODIUM CHLORIDE 75 ML/HR: 0.45 INJECTION, SOLUTION INTRAVENOUS at 04:12

## 2017-03-29 RX ADMIN — INSULIN LISPRO 5 UNITS: 100 INJECTION, SOLUTION INTRAVENOUS; SUBCUTANEOUS at 09:17

## 2017-03-29 RX ADMIN — ASPIRIN 325 MG: 325 TABLET ORAL at 08:15

## 2017-03-29 RX ADMIN — INSULIN LISPRO 3 UNITS: 100 INJECTION, SOLUTION INTRAVENOUS; SUBCUTANEOUS at 21:26

## 2017-03-29 RX ADMIN — INSULIN LISPRO 5 UNITS: 100 INJECTION, SOLUTION INTRAVENOUS; SUBCUTANEOUS at 17:39

## 2017-03-29 RX ADMIN — INSULIN LISPRO 5 UNITS: 100 INJECTION, SOLUTION INTRAVENOUS; SUBCUTANEOUS at 13:49

## 2017-03-29 RX ADMIN — ENOXAPARIN SODIUM 40 MG: 40 INJECTION SUBCUTANEOUS at 08:15

## 2017-03-30 ENCOUNTER — GENERIC CONVERSION - ENCOUNTER (OUTPATIENT)
Dept: OTHER | Facility: OTHER | Age: 77
End: 2017-03-30

## 2017-03-30 ENCOUNTER — APPOINTMENT (INPATIENT)
Dept: NON INVASIVE DIAGNOSTICS | Facility: HOSPITAL | Age: 77
DRG: 041 | End: 2017-03-30
Attending: INTERNAL MEDICINE
Payer: COMMERCIAL

## 2017-03-30 ENCOUNTER — APPOINTMENT (INPATIENT)
Dept: OCCUPATIONAL THERAPY | Facility: HOSPITAL | Age: 77
DRG: 041 | End: 2017-03-30
Payer: COMMERCIAL

## 2017-03-30 LAB
ATRIAL RATE: 58 BPM
ATRIAL RATE: 58 BPM
GLUCOSE SERPL-MCNC: 120 MG/DL (ref 65–140)
GLUCOSE SERPL-MCNC: 162 MG/DL (ref 65–140)
GLUCOSE SERPL-MCNC: 177 MG/DL (ref 65–140)
GLUCOSE SERPL-MCNC: 213 MG/DL (ref 65–140)
P AXIS: -28 DEGREES
P AXIS: -30 DEGREES
PR INTERVAL: 348 MS
PR INTERVAL: 368 MS
QRS AXIS: -36 DEGREES
QRS AXIS: -39 DEGREES
QRSD INTERVAL: 120 MS
QRSD INTERVAL: 120 MS
QT INTERVAL: 466 MS
QT INTERVAL: 468 MS
QTC INTERVAL: 457 MS
QTC INTERVAL: 459 MS
T WAVE AXIS: 105 DEGREES
T WAVE AXIS: 92 DEGREES
VENTRICULAR RATE: 58 BPM
VENTRICULAR RATE: 58 BPM

## 2017-03-30 PROCEDURE — A9270 NON-COVERED ITEM OR SERVICE: HCPCS | Performed by: FAMILY MEDICINE

## 2017-03-30 PROCEDURE — A9270 NON-COVERED ITEM OR SERVICE: HCPCS | Performed by: PSYCHIATRY & NEUROLOGY

## 2017-03-30 PROCEDURE — 97110 THERAPEUTIC EXERCISES: CPT

## 2017-03-30 PROCEDURE — A9270 NON-COVERED ITEM OR SERVICE: HCPCS | Performed by: PHYSICIAN ASSISTANT

## 2017-03-30 PROCEDURE — 33282 HB IMPLANT PAT-ACTIVE HT RECORD: CPT | Performed by: PHYSICIAN ASSISTANT

## 2017-03-30 PROCEDURE — 0JH632Z INSERTION OF MONITORING DEVICE INTO CHEST SUBCUTANEOUS TISSUE AND FASCIA, PERCUTANEOUS APPROACH: ICD-10-PCS | Performed by: INTERNAL MEDICINE

## 2017-03-30 PROCEDURE — 97535 SELF CARE MNGMENT TRAINING: CPT

## 2017-03-30 PROCEDURE — C1764 EVENT RECORDER, CARDIAC: HCPCS

## 2017-03-30 PROCEDURE — 82948 REAGENT STRIP/BLOOD GLUCOSE: CPT

## 2017-03-30 RX ORDER — LIDOCAINE HYDROCHLORIDE AND EPINEPHRINE 10; 10 MG/ML; UG/ML
INJECTION, SOLUTION INFILTRATION; PERINEURAL CODE/TRAUMA/SEDATION MEDICATION
Status: COMPLETED | OUTPATIENT
Start: 2017-03-30 | End: 2017-03-30

## 2017-03-30 RX ORDER — LISINOPRIL 10 MG/1
10 TABLET ORAL DAILY
Status: DISCONTINUED | OUTPATIENT
Start: 2017-03-31 | End: 2017-03-31 | Stop reason: HOSPADM

## 2017-03-30 RX ADMIN — ENOXAPARIN SODIUM 40 MG: 40 INJECTION SUBCUTANEOUS at 09:25

## 2017-03-30 RX ADMIN — INSULIN LISPRO 4 UNITS: 100 INJECTION, SOLUTION INTRAVENOUS; SUBCUTANEOUS at 16:11

## 2017-03-30 RX ADMIN — INSULIN LISPRO 5 UNITS: 100 INJECTION, SOLUTION INTRAVENOUS; SUBCUTANEOUS at 18:43

## 2017-03-30 RX ADMIN — METOPROLOL TARTRATE 50 MG: 50 TABLET ORAL at 09:25

## 2017-03-30 RX ADMIN — METOPROLOL TARTRATE 50 MG: 50 TABLET ORAL at 21:21

## 2017-03-30 RX ADMIN — ASPIRIN 325 MG: 325 TABLET ORAL at 09:25

## 2017-03-30 RX ADMIN — INSULIN LISPRO 2 UNITS: 100 INJECTION, SOLUTION INTRAVENOUS; SUBCUTANEOUS at 18:43

## 2017-03-30 RX ADMIN — LIDOCAINE HYDROCHLORIDE AND EPINEPHRINE 20 ML: 10; 10 INJECTION, SOLUTION INFILTRATION; PERINEURAL at 13:49

## 2017-03-30 RX ADMIN — INSULIN LISPRO 5 UNITS: 100 INJECTION, SOLUTION INTRAVENOUS; SUBCUTANEOUS at 09:28

## 2017-03-30 RX ADMIN — LORATADINE 5 MG: 10 TABLET ORAL at 21:20

## 2017-03-30 RX ADMIN — INSULIN HUMAN 25 UNITS: 100 INJECTION, SUSPENSION SUBCUTANEOUS at 18:41

## 2017-03-30 RX ADMIN — ATORVASTATIN CALCIUM 40 MG: 40 TABLET, FILM COATED ORAL at 17:41

## 2017-03-30 RX ADMIN — LISINOPRIL 2.5 MG: 2.5 TABLET ORAL at 09:25

## 2017-03-30 RX ADMIN — CYANOCOBALAMIN TAB 500 MCG 1000 MCG: 500 TAB at 09:24

## 2017-03-30 RX ADMIN — INSULIN LISPRO 1 UNITS: 100 INJECTION, SOLUTION INTRAVENOUS; SUBCUTANEOUS at 21:21

## 2017-03-30 RX ADMIN — INSULIN LISPRO 5 UNITS: 100 INJECTION, SOLUTION INTRAVENOUS; SUBCUTANEOUS at 16:10

## 2017-03-31 ENCOUNTER — APPOINTMENT (INPATIENT)
Dept: PHYSICAL THERAPY | Facility: HOSPITAL | Age: 77
DRG: 041 | End: 2017-03-31
Payer: COMMERCIAL

## 2017-03-31 ENCOUNTER — HOSPITAL ENCOUNTER (INPATIENT)
Facility: HOSPITAL | Age: 77
LOS: 7 days | Discharge: HOME | DRG: 057 | End: 2017-04-07
Attending: PHYSICAL MEDICINE & REHABILITATION | Admitting: PHYSICAL MEDICINE & REHABILITATION
Payer: COMMERCIAL

## 2017-03-31 VITALS
HEART RATE: 59 BPM | SYSTOLIC BLOOD PRESSURE: 156 MMHG | OXYGEN SATURATION: 96 % | BODY MASS INDEX: 31.45 KG/M2 | WEIGHT: 177.47 LBS | TEMPERATURE: 97.7 F | HEIGHT: 63 IN | RESPIRATION RATE: 20 BRPM | DIASTOLIC BLOOD PRESSURE: 58 MMHG

## 2017-03-31 DIAGNOSIS — I63.9 CEREBROVASCULAR ACCIDENT (CVA), UNSPECIFIED MECHANISM (HCC): Primary | ICD-10-CM

## 2017-03-31 LAB
ANION GAP SERPL CALCULATED.3IONS-SCNC: 7 MMOL/L (ref 4–13)
BUN SERPL-MCNC: 20 MG/DL (ref 5–25)
CALCIUM SERPL-MCNC: 9.1 MG/DL (ref 8.3–10.1)
CHLORIDE SERPL-SCNC: 108 MMOL/L (ref 100–108)
CO2 SERPL-SCNC: 30 MMOL/L (ref 21–32)
CREAT SERPL-MCNC: 1.21 MG/DL (ref 0.6–1.3)
GFR SERPL CREATININE-BSD FRML MDRD: 58.3 ML/MIN/1.73SQ M
GLUCOSE SERPL-MCNC: 147 MG/DL (ref 65–140)
GLUCOSE SERPL-MCNC: 177 MG/DL (ref 65–140)
GLUCOSE SERPL-MCNC: 211 MG/DL (ref 65–140)
GLUCOSE SERPL-MCNC: 81 MG/DL (ref 65–140)
GLUCOSE SERPL-MCNC: 94 MG/DL (ref 65–140)
POTASSIUM SERPL-SCNC: 3.7 MMOL/L (ref 3.5–5.3)
SODIUM SERPL-SCNC: 145 MMOL/L (ref 136–145)

## 2017-03-31 PROCEDURE — A9270 NON-COVERED ITEM OR SERVICE: HCPCS | Performed by: PHYSICIAN ASSISTANT

## 2017-03-31 PROCEDURE — A9270 NON-COVERED ITEM OR SERVICE: HCPCS | Performed by: PHYSICAL MEDICINE & REHABILITATION

## 2017-03-31 PROCEDURE — 82948 REAGENT STRIP/BLOOD GLUCOSE: CPT

## 2017-03-31 PROCEDURE — 97116 GAIT TRAINING THERAPY: CPT

## 2017-03-31 PROCEDURE — 80048 BASIC METABOLIC PNL TOTAL CA: CPT | Performed by: PHYSICIAN ASSISTANT

## 2017-03-31 PROCEDURE — 97110 THERAPEUTIC EXERCISES: CPT

## 2017-03-31 PROCEDURE — A9270 NON-COVERED ITEM OR SERVICE: HCPCS | Performed by: PSYCHIATRY & NEUROLOGY

## 2017-03-31 PROCEDURE — A9270 NON-COVERED ITEM OR SERVICE: HCPCS | Performed by: FAMILY MEDICINE

## 2017-03-31 RX ORDER — LORATADINE 10 MG/1
5 TABLET ORAL DAILY
Status: DISCONTINUED | OUTPATIENT
Start: 2017-03-31 | End: 2017-04-07 | Stop reason: HOSPADM

## 2017-03-31 RX ORDER — ASPIRIN 325 MG
325 TABLET ORAL DAILY
Status: CANCELLED | OUTPATIENT
Start: 2017-04-01

## 2017-03-31 RX ORDER — SIMETHICONE 80 MG
80 TABLET,CHEWABLE ORAL 4 TIMES DAILY PRN
Status: DISCONTINUED | OUTPATIENT
Start: 2017-03-31 | End: 2017-04-07 | Stop reason: HOSPADM

## 2017-03-31 RX ORDER — ACETAMINOPHEN 325 MG/1
650 TABLET ORAL EVERY 4 HOURS PRN
Status: DISCONTINUED | OUTPATIENT
Start: 2017-03-31 | End: 2017-04-07 | Stop reason: HOSPADM

## 2017-03-31 RX ORDER — METOPROLOL TARTRATE 50 MG/1
50 TABLET, FILM COATED ORAL EVERY 12 HOURS SCHEDULED
Status: CANCELLED | OUTPATIENT
Start: 2017-03-31

## 2017-03-31 RX ORDER — ATORVASTATIN CALCIUM 40 MG/1
40 TABLET, FILM COATED ORAL
Status: CANCELLED | OUTPATIENT
Start: 2017-03-31

## 2017-03-31 RX ORDER — ASPIRIN 325 MG
325 TABLET ORAL DAILY
Status: DISCONTINUED | OUTPATIENT
Start: 2017-04-01 | End: 2017-04-07 | Stop reason: HOSPADM

## 2017-03-31 RX ORDER — LORATADINE 10 MG/1
5 TABLET ORAL DAILY
Status: CANCELLED | OUTPATIENT
Start: 2017-03-31

## 2017-03-31 RX ORDER — METOPROLOL TARTRATE 50 MG/1
50 TABLET, FILM COATED ORAL EVERY 12 HOURS SCHEDULED
Status: DISCONTINUED | OUTPATIENT
Start: 2017-03-31 | End: 2017-04-07 | Stop reason: HOSPADM

## 2017-03-31 RX ORDER — ACETAMINOPHEN 325 MG/1
650 TABLET ORAL EVERY 4 HOURS PRN
Status: CANCELLED | OUTPATIENT
Start: 2017-03-31

## 2017-03-31 RX ORDER — CHOLECALCIFEROL (VITAMIN D3) 125 MCG
1000 CAPSULE ORAL DAILY
Status: CANCELLED | OUTPATIENT
Start: 2017-04-01

## 2017-03-31 RX ORDER — CHOLECALCIFEROL (VITAMIN D3) 125 MCG
1000 CAPSULE ORAL DAILY
Status: DISCONTINUED | OUTPATIENT
Start: 2017-04-01 | End: 2017-04-07 | Stop reason: HOSPADM

## 2017-03-31 RX ORDER — ATORVASTATIN CALCIUM 40 MG/1
40 TABLET, FILM COATED ORAL
Status: DISCONTINUED | OUTPATIENT
Start: 2017-03-31 | End: 2017-04-07 | Stop reason: HOSPADM

## 2017-03-31 RX ORDER — LISINOPRIL 10 MG/1
10 TABLET ORAL DAILY
Status: DISCONTINUED | OUTPATIENT
Start: 2017-04-01 | End: 2017-04-06

## 2017-03-31 RX ORDER — LISINOPRIL 10 MG/1
10 TABLET ORAL DAILY
Status: CANCELLED | OUTPATIENT
Start: 2017-04-01

## 2017-03-31 RX ORDER — SIMETHICONE 80 MG
80 TABLET,CHEWABLE ORAL 4 TIMES DAILY PRN
Status: CANCELLED | OUTPATIENT
Start: 2017-03-31

## 2017-03-31 RX ADMIN — METFORMIN HYDROCHLORIDE 500 MG: 500 TABLET, FILM COATED ORAL at 16:25

## 2017-03-31 RX ADMIN — ENOXAPARIN SODIUM 40 MG: 40 INJECTION SUBCUTANEOUS at 08:05

## 2017-03-31 RX ADMIN — INSULIN HUMAN 22 UNITS: 100 INJECTION, SUSPENSION SUBCUTANEOUS at 17:01

## 2017-03-31 RX ADMIN — METOPROLOL TARTRATE 50 MG: 50 TABLET ORAL at 08:05

## 2017-03-31 RX ADMIN — INSULIN LISPRO 5 UNITS: 100 INJECTION, SOLUTION INTRAVENOUS; SUBCUTANEOUS at 11:56

## 2017-03-31 RX ADMIN — METOPROLOL TARTRATE 50 MG: 50 TABLET ORAL at 21:36

## 2017-03-31 RX ADMIN — INSULIN LISPRO 1 UNITS: 100 INJECTION, SOLUTION INTRAVENOUS; SUBCUTANEOUS at 11:55

## 2017-03-31 RX ADMIN — LORATADINE 5 MG: 10 TABLET ORAL at 21:36

## 2017-03-31 RX ADMIN — ATORVASTATIN CALCIUM 40 MG: 40 TABLET, FILM COATED ORAL at 16:25

## 2017-03-31 RX ADMIN — CYANOCOBALAMIN TAB 500 MCG 1000 MCG: 500 TAB at 08:05

## 2017-03-31 RX ADMIN — ASPIRIN 325 MG: 325 TABLET ORAL at 08:05

## 2017-03-31 RX ADMIN — LISINOPRIL 10 MG: 10 TABLET ORAL at 08:05

## 2017-03-31 RX ADMIN — INSULIN LISPRO 2 UNITS: 100 INJECTION, SOLUTION INTRAVENOUS; SUBCUTANEOUS at 16:26

## 2017-04-01 LAB
GLUCOSE SERPL-MCNC: 108 MG/DL (ref 65–140)
GLUCOSE SERPL-MCNC: 184 MG/DL (ref 65–140)
GLUCOSE SERPL-MCNC: 216 MG/DL (ref 65–140)
GLUCOSE SERPL-MCNC: 287 MG/DL (ref 65–140)

## 2017-04-01 PROCEDURE — A9270 NON-COVERED ITEM OR SERVICE: HCPCS | Performed by: PHYSICAL MEDICINE & REHABILITATION

## 2017-04-01 PROCEDURE — 97530 THERAPEUTIC ACTIVITIES: CPT

## 2017-04-01 PROCEDURE — 97166 OT EVAL MOD COMPLEX 45 MIN: CPT

## 2017-04-01 PROCEDURE — 82948 REAGENT STRIP/BLOOD GLUCOSE: CPT

## 2017-04-01 PROCEDURE — 97535 SELF CARE MNGMENT TRAINING: CPT

## 2017-04-01 PROCEDURE — 97162 PT EVAL MOD COMPLEX 30 MIN: CPT

## 2017-04-01 PROCEDURE — 97112 NEUROMUSCULAR REEDUCATION: CPT

## 2017-04-01 PROCEDURE — A9270 NON-COVERED ITEM OR SERVICE: HCPCS | Performed by: PHYSICIAN ASSISTANT

## 2017-04-01 RX ADMIN — ATORVASTATIN CALCIUM 40 MG: 40 TABLET, FILM COATED ORAL at 17:26

## 2017-04-01 RX ADMIN — INSULIN HUMAN 22 UNITS: 100 INJECTION, SUSPENSION SUBCUTANEOUS at 17:28

## 2017-04-01 RX ADMIN — LISINOPRIL 10 MG: 10 TABLET ORAL at 09:13

## 2017-04-01 RX ADMIN — METFORMIN HYDROCHLORIDE 500 MG: 500 TABLET, FILM COATED ORAL at 17:26

## 2017-04-01 RX ADMIN — INSULIN HUMAN 12 UNITS: 100 INJECTION, SUSPENSION SUBCUTANEOUS at 09:17

## 2017-04-01 RX ADMIN — ASPIRIN 325 MG: 325 TABLET ORAL at 09:13

## 2017-04-01 RX ADMIN — METFORMIN HYDROCHLORIDE 500 MG: 500 TABLET, FILM COATED ORAL at 09:13

## 2017-04-01 RX ADMIN — ENOXAPARIN SODIUM 40 MG: 40 INJECTION SUBCUTANEOUS at 09:13

## 2017-04-01 RX ADMIN — LORATADINE 5 MG: 10 TABLET ORAL at 21:33

## 2017-04-01 RX ADMIN — METOPROLOL TARTRATE 50 MG: 50 TABLET ORAL at 09:13

## 2017-04-01 RX ADMIN — INSULIN LISPRO 3 UNITS: 100 INJECTION, SOLUTION INTRAVENOUS; SUBCUTANEOUS at 12:14

## 2017-04-01 RX ADMIN — INSULIN LISPRO 2 UNITS: 100 INJECTION, SOLUTION INTRAVENOUS; SUBCUTANEOUS at 17:27

## 2017-04-01 RX ADMIN — INSULIN LISPRO 1 UNITS: 100 INJECTION, SOLUTION INTRAVENOUS; SUBCUTANEOUS at 21:33

## 2017-04-01 RX ADMIN — CYANOCOBALAMIN TAB 500 MCG 1000 MCG: 500 TAB at 09:14

## 2017-04-02 LAB
GLUCOSE SERPL-MCNC: 108 MG/DL (ref 65–140)
GLUCOSE SERPL-MCNC: 177 MG/DL (ref 65–140)
GLUCOSE SERPL-MCNC: 207 MG/DL (ref 65–140)
GLUCOSE SERPL-MCNC: 287 MG/DL (ref 65–140)

## 2017-04-02 PROCEDURE — 97530 THERAPEUTIC ACTIVITIES: CPT

## 2017-04-02 PROCEDURE — A9270 NON-COVERED ITEM OR SERVICE: HCPCS | Performed by: PHYSICAL MEDICINE & REHABILITATION

## 2017-04-02 PROCEDURE — A9270 NON-COVERED ITEM OR SERVICE: HCPCS | Performed by: NURSE PRACTITIONER

## 2017-04-02 PROCEDURE — 82948 REAGENT STRIP/BLOOD GLUCOSE: CPT

## 2017-04-02 PROCEDURE — 97116 GAIT TRAINING THERAPY: CPT

## 2017-04-02 PROCEDURE — 97110 THERAPEUTIC EXERCISES: CPT

## 2017-04-02 PROCEDURE — A9270 NON-COVERED ITEM OR SERVICE: HCPCS | Performed by: PHYSICIAN ASSISTANT

## 2017-04-02 PROCEDURE — 97535 SELF CARE MNGMENT TRAINING: CPT

## 2017-04-02 RX ADMIN — METFORMIN HYDROCHLORIDE 500 MG: 500 TABLET, FILM COATED ORAL at 11:05

## 2017-04-02 RX ADMIN — LORATADINE 5 MG: 10 TABLET ORAL at 21:39

## 2017-04-02 RX ADMIN — ENOXAPARIN SODIUM 40 MG: 40 INJECTION SUBCUTANEOUS at 11:04

## 2017-04-02 RX ADMIN — METFORMIN HYDROCHLORIDE 500 MG: 500 TABLET, FILM COATED ORAL at 17:50

## 2017-04-02 RX ADMIN — ASPIRIN 325 MG: 325 TABLET ORAL at 11:04

## 2017-04-02 RX ADMIN — ATORVASTATIN CALCIUM 40 MG: 40 TABLET, FILM COATED ORAL at 17:50

## 2017-04-02 RX ADMIN — CYANOCOBALAMIN TAB 500 MCG 1000 MCG: 500 TAB at 11:04

## 2017-04-02 RX ADMIN — INSULIN HUMAN 22 UNITS: 100 INJECTION, SUSPENSION SUBCUTANEOUS at 18:15

## 2017-04-02 RX ADMIN — INSULIN HUMAN 12 UNITS: 100 INJECTION, SUSPENSION SUBCUTANEOUS at 11:06

## 2017-04-02 RX ADMIN — INSULIN LISPRO 1 UNITS: 100 INJECTION, SOLUTION INTRAVENOUS; SUBCUTANEOUS at 21:39

## 2017-04-02 RX ADMIN — INSULIN LISPRO 1 UNITS: 100 INJECTION, SOLUTION INTRAVENOUS; SUBCUTANEOUS at 17:51

## 2017-04-02 RX ADMIN — LISINOPRIL 10 MG: 10 TABLET ORAL at 11:05

## 2017-04-02 RX ADMIN — INSULIN LISPRO 3 UNITS: 100 INJECTION, SOLUTION INTRAVENOUS; SUBCUTANEOUS at 12:36

## 2017-04-02 RX ADMIN — METOPROLOL TARTRATE 50 MG: 50 TABLET ORAL at 11:05

## 2017-04-03 LAB
ANION GAP SERPL CALCULATED.3IONS-SCNC: 6 MMOL/L (ref 4–13)
BASOPHILS # BLD AUTO: 0.02 THOUSANDS/ΜL (ref 0–0.1)
BASOPHILS NFR BLD AUTO: 0 % (ref 0–1)
BUN SERPL-MCNC: 26 MG/DL (ref 5–25)
CALCIUM SERPL-MCNC: 9.2 MG/DL (ref 8.3–10.1)
CHLORIDE SERPL-SCNC: 106 MMOL/L (ref 100–108)
CO2 SERPL-SCNC: 30 MMOL/L (ref 21–32)
CREAT SERPL-MCNC: 1.31 MG/DL (ref 0.6–1.3)
EOSINOPHIL # BLD AUTO: 0.16 THOUSAND/ΜL (ref 0–0.61)
EOSINOPHIL NFR BLD AUTO: 2 % (ref 0–6)
ERYTHROCYTE [DISTWIDTH] IN BLOOD BY AUTOMATED COUNT: 14.9 % (ref 11.6–15.1)
GFR SERPL CREATININE-BSD FRML MDRD: 53.2 ML/MIN/1.73SQ M
GLUCOSE P FAST SERPL-MCNC: 92 MG/DL (ref 65–99)
GLUCOSE SERPL-MCNC: 103 MG/DL (ref 65–140)
GLUCOSE SERPL-MCNC: 124 MG/DL (ref 65–140)
GLUCOSE SERPL-MCNC: 167 MG/DL (ref 65–140)
GLUCOSE SERPL-MCNC: 208 MG/DL (ref 65–140)
GLUCOSE SERPL-MCNC: 92 MG/DL (ref 65–140)
HCT VFR BLD AUTO: 40.5 % (ref 36.5–49.3)
HGB BLD-MCNC: 13.5 G/DL (ref 12–17)
LYMPHOCYTES # BLD AUTO: 2.02 THOUSANDS/ΜL (ref 0.6–4.47)
LYMPHOCYTES NFR BLD AUTO: 29 % (ref 14–44)
MCH RBC QN AUTO: 28.2 PG (ref 26.8–34.3)
MCHC RBC AUTO-ENTMCNC: 33.3 G/DL (ref 31.4–37.4)
MCV RBC AUTO: 85 FL (ref 82–98)
MONOCYTES # BLD AUTO: 0.67 THOUSAND/ΜL (ref 0.17–1.22)
MONOCYTES NFR BLD AUTO: 9 % (ref 4–12)
NEUTROPHILS # BLD AUTO: 4.21 THOUSANDS/ΜL (ref 1.85–7.62)
NEUTS SEG NFR BLD AUTO: 60 % (ref 43–75)
NRBC BLD AUTO-RTO: 0 /100 WBCS
PLATELET # BLD AUTO: 217 THOUSANDS/UL (ref 149–390)
PMV BLD AUTO: 10.9 FL (ref 8.9–12.7)
POTASSIUM SERPL-SCNC: 4 MMOL/L (ref 3.5–5.3)
RBC # BLD AUTO: 4.79 MILLION/UL (ref 3.88–5.62)
SODIUM SERPL-SCNC: 142 MMOL/L (ref 136–145)
WBC # BLD AUTO: 7.1 THOUSAND/UL (ref 4.31–10.16)

## 2017-04-03 PROCEDURE — 82948 REAGENT STRIP/BLOOD GLUCOSE: CPT

## 2017-04-03 PROCEDURE — 97535 SELF CARE MNGMENT TRAINING: CPT

## 2017-04-03 PROCEDURE — A9270 NON-COVERED ITEM OR SERVICE: HCPCS | Performed by: NURSE PRACTITIONER

## 2017-04-03 PROCEDURE — 97116 GAIT TRAINING THERAPY: CPT

## 2017-04-03 PROCEDURE — 85025 COMPLETE CBC W/AUTO DIFF WBC: CPT | Performed by: PHYSICIAN ASSISTANT

## 2017-04-03 PROCEDURE — A9270 NON-COVERED ITEM OR SERVICE: HCPCS | Performed by: PHYSICIAN ASSISTANT

## 2017-04-03 PROCEDURE — 97112 NEUROMUSCULAR REEDUCATION: CPT

## 2017-04-03 PROCEDURE — 80048 BASIC METABOLIC PNL TOTAL CA: CPT | Performed by: PHYSICIAN ASSISTANT

## 2017-04-03 PROCEDURE — A9270 NON-COVERED ITEM OR SERVICE: HCPCS | Performed by: PHYSICAL MEDICINE & REHABILITATION

## 2017-04-03 PROCEDURE — 97530 THERAPEUTIC ACTIVITIES: CPT

## 2017-04-03 PROCEDURE — 97110 THERAPEUTIC EXERCISES: CPT

## 2017-04-03 RX ADMIN — LORATADINE 5 MG: 10 TABLET ORAL at 21:45

## 2017-04-03 RX ADMIN — LISINOPRIL 10 MG: 10 TABLET ORAL at 08:28

## 2017-04-03 RX ADMIN — METOPROLOL TARTRATE 50 MG: 50 TABLET ORAL at 10:34

## 2017-04-03 RX ADMIN — CYANOCOBALAMIN TAB 500 MCG 1000 MCG: 500 TAB at 08:28

## 2017-04-03 RX ADMIN — INSULIN LISPRO 1 UNITS: 100 INJECTION, SOLUTION INTRAVENOUS; SUBCUTANEOUS at 12:10

## 2017-04-03 RX ADMIN — INSULIN LISPRO 1 UNITS: 100 INJECTION, SOLUTION INTRAVENOUS; SUBCUTANEOUS at 17:00

## 2017-04-03 RX ADMIN — ATORVASTATIN CALCIUM 40 MG: 40 TABLET, FILM COATED ORAL at 16:57

## 2017-04-03 RX ADMIN — METFORMIN HYDROCHLORIDE 500 MG: 500 TABLET, FILM COATED ORAL at 08:28

## 2017-04-03 RX ADMIN — ASPIRIN 325 MG: 325 TABLET ORAL at 08:28

## 2017-04-03 RX ADMIN — METFORMIN HYDROCHLORIDE 500 MG: 500 TABLET, FILM COATED ORAL at 16:57

## 2017-04-03 RX ADMIN — ENOXAPARIN SODIUM 40 MG: 40 INJECTION SUBCUTANEOUS at 08:28

## 2017-04-03 RX ADMIN — INSULIN HUMAN 22 UNITS: 100 INJECTION, SUSPENSION SUBCUTANEOUS at 16:58

## 2017-04-04 LAB
ANION GAP SERPL CALCULATED.3IONS-SCNC: 5 MMOL/L (ref 4–13)
BUN SERPL-MCNC: 23 MG/DL (ref 5–25)
CALCIUM SERPL-MCNC: 9.2 MG/DL (ref 8.3–10.1)
CHLORIDE SERPL-SCNC: 107 MMOL/L (ref 100–108)
CO2 SERPL-SCNC: 30 MMOL/L (ref 21–32)
CREAT SERPL-MCNC: 1.24 MG/DL (ref 0.6–1.3)
GFR SERPL CREATININE-BSD FRML MDRD: 56.7 ML/MIN/1.73SQ M
GLUCOSE SERPL-MCNC: 103 MG/DL (ref 65–140)
GLUCOSE SERPL-MCNC: 110 MG/DL (ref 65–140)
GLUCOSE SERPL-MCNC: 118 MG/DL (ref 65–140)
GLUCOSE SERPL-MCNC: 160 MG/DL (ref 65–140)
GLUCOSE SERPL-MCNC: 172 MG/DL (ref 65–140)
POTASSIUM SERPL-SCNC: 4.1 MMOL/L (ref 3.5–5.3)
SODIUM SERPL-SCNC: 142 MMOL/L (ref 136–145)

## 2017-04-04 PROCEDURE — A9270 NON-COVERED ITEM OR SERVICE: HCPCS | Performed by: NURSE PRACTITIONER

## 2017-04-04 PROCEDURE — 97110 THERAPEUTIC EXERCISES: CPT

## 2017-04-04 PROCEDURE — 97112 NEUROMUSCULAR REEDUCATION: CPT

## 2017-04-04 PROCEDURE — 97530 THERAPEUTIC ACTIVITIES: CPT

## 2017-04-04 PROCEDURE — A9270 NON-COVERED ITEM OR SERVICE: HCPCS | Performed by: PHYSICAL MEDICINE & REHABILITATION

## 2017-04-04 PROCEDURE — 97535 SELF CARE MNGMENT TRAINING: CPT

## 2017-04-04 PROCEDURE — A9270 NON-COVERED ITEM OR SERVICE: HCPCS | Performed by: PHYSICIAN ASSISTANT

## 2017-04-04 PROCEDURE — 82948 REAGENT STRIP/BLOOD GLUCOSE: CPT

## 2017-04-04 PROCEDURE — 97116 GAIT TRAINING THERAPY: CPT

## 2017-04-04 PROCEDURE — 80048 BASIC METABOLIC PNL TOTAL CA: CPT | Performed by: PHYSICIAN ASSISTANT

## 2017-04-04 RX ADMIN — LISINOPRIL 10 MG: 10 TABLET ORAL at 09:20

## 2017-04-04 RX ADMIN — ATORVASTATIN CALCIUM 40 MG: 40 TABLET, FILM COATED ORAL at 17:08

## 2017-04-04 RX ADMIN — ASPIRIN 325 MG: 325 TABLET ORAL at 09:21

## 2017-04-04 RX ADMIN — METOPROLOL TARTRATE 50 MG: 50 TABLET ORAL at 09:20

## 2017-04-04 RX ADMIN — METOPROLOL TARTRATE 50 MG: 50 TABLET ORAL at 21:46

## 2017-04-04 RX ADMIN — ENOXAPARIN SODIUM 40 MG: 40 INJECTION SUBCUTANEOUS at 09:20

## 2017-04-04 RX ADMIN — LORATADINE 5 MG: 10 TABLET ORAL at 21:47

## 2017-04-04 RX ADMIN — INSULIN HUMAN 22 UNITS: 100 INJECTION, SUSPENSION SUBCUTANEOUS at 17:09

## 2017-04-04 RX ADMIN — METFORMIN HYDROCHLORIDE 500 MG: 500 TABLET, FILM COATED ORAL at 17:08

## 2017-04-04 RX ADMIN — CYANOCOBALAMIN TAB 500 MCG 1000 MCG: 500 TAB at 09:20

## 2017-04-04 RX ADMIN — INSULIN LISPRO 1 UNITS: 100 INJECTION, SOLUTION INTRAVENOUS; SUBCUTANEOUS at 11:41

## 2017-04-04 RX ADMIN — INSULIN LISPRO 1 UNITS: 100 INJECTION, SOLUTION INTRAVENOUS; SUBCUTANEOUS at 21:53

## 2017-04-04 RX ADMIN — METFORMIN HYDROCHLORIDE 500 MG: 500 TABLET, FILM COATED ORAL at 09:21

## 2017-04-05 LAB
GLUCOSE SERPL-MCNC: 107 MG/DL (ref 65–140)
GLUCOSE SERPL-MCNC: 124 MG/DL (ref 65–140)
GLUCOSE SERPL-MCNC: 222 MG/DL (ref 65–140)
GLUCOSE SERPL-MCNC: 53 MG/DL (ref 65–140)
GLUCOSE SERPL-MCNC: 58 MG/DL (ref 65–140)
GLUCOSE SERPL-MCNC: 85 MG/DL (ref 65–140)

## 2017-04-05 PROCEDURE — 97116 GAIT TRAINING THERAPY: CPT

## 2017-04-05 PROCEDURE — A9270 NON-COVERED ITEM OR SERVICE: HCPCS | Performed by: NURSE PRACTITIONER

## 2017-04-05 PROCEDURE — A9270 NON-COVERED ITEM OR SERVICE: HCPCS | Performed by: PHYSICIAN ASSISTANT

## 2017-04-05 PROCEDURE — A9270 NON-COVERED ITEM OR SERVICE: HCPCS | Performed by: PHYSICAL MEDICINE & REHABILITATION

## 2017-04-05 PROCEDURE — 97530 THERAPEUTIC ACTIVITIES: CPT

## 2017-04-05 PROCEDURE — 97535 SELF CARE MNGMENT TRAINING: CPT

## 2017-04-05 PROCEDURE — 82948 REAGENT STRIP/BLOOD GLUCOSE: CPT

## 2017-04-05 RX ADMIN — ATORVASTATIN CALCIUM 40 MG: 40 TABLET, FILM COATED ORAL at 16:42

## 2017-04-05 RX ADMIN — INSULIN LISPRO 2 UNITS: 100 INJECTION, SOLUTION INTRAVENOUS; SUBCUTANEOUS at 11:34

## 2017-04-05 RX ADMIN — METFORMIN HYDROCHLORIDE 500 MG: 500 TABLET, FILM COATED ORAL at 08:52

## 2017-04-05 RX ADMIN — METFORMIN HYDROCHLORIDE 500 MG: 500 TABLET, FILM COATED ORAL at 16:42

## 2017-04-05 RX ADMIN — LISINOPRIL 10 MG: 10 TABLET ORAL at 08:53

## 2017-04-05 RX ADMIN — METOPROLOL TARTRATE 50 MG: 50 TABLET ORAL at 08:52

## 2017-04-05 RX ADMIN — INSULIN HUMAN 22 UNITS: 100 INJECTION, SUSPENSION SUBCUTANEOUS at 16:44

## 2017-04-05 RX ADMIN — ASPIRIN 325 MG: 325 TABLET ORAL at 08:52

## 2017-04-05 RX ADMIN — LORATADINE 5 MG: 10 TABLET ORAL at 21:50

## 2017-04-05 RX ADMIN — CYANOCOBALAMIN TAB 500 MCG 1000 MCG: 500 TAB at 08:54

## 2017-04-05 RX ADMIN — METOPROLOL TARTRATE 50 MG: 50 TABLET ORAL at 21:50

## 2017-04-05 RX ADMIN — ENOXAPARIN SODIUM 40 MG: 40 INJECTION SUBCUTANEOUS at 08:52

## 2017-04-06 LAB
GLUCOSE SERPL-MCNC: 115 MG/DL (ref 65–140)
GLUCOSE SERPL-MCNC: 121 MG/DL (ref 65–140)
GLUCOSE SERPL-MCNC: 129 MG/DL (ref 65–140)
GLUCOSE SERPL-MCNC: 174 MG/DL (ref 65–140)
GLUCOSE SERPL-MCNC: 235 MG/DL (ref 65–140)

## 2017-04-06 PROCEDURE — 97112 NEUROMUSCULAR REEDUCATION: CPT

## 2017-04-06 PROCEDURE — A9270 NON-COVERED ITEM OR SERVICE: HCPCS | Performed by: PHYSICIAN ASSISTANT

## 2017-04-06 PROCEDURE — A9270 NON-COVERED ITEM OR SERVICE: HCPCS | Performed by: NURSE PRACTITIONER

## 2017-04-06 PROCEDURE — 82948 REAGENT STRIP/BLOOD GLUCOSE: CPT

## 2017-04-06 PROCEDURE — A9270 NON-COVERED ITEM OR SERVICE: HCPCS | Performed by: PHYSICAL MEDICINE & REHABILITATION

## 2017-04-06 PROCEDURE — 97530 THERAPEUTIC ACTIVITIES: CPT

## 2017-04-06 PROCEDURE — 97535 SELF CARE MNGMENT TRAINING: CPT

## 2017-04-06 RX ORDER — LISINOPRIL 20 MG/1
20 TABLET ORAL DAILY
Status: DISCONTINUED | OUTPATIENT
Start: 2017-04-07 | End: 2017-04-07 | Stop reason: HOSPADM

## 2017-04-06 RX ADMIN — METOPROLOL TARTRATE 50 MG: 50 TABLET ORAL at 08:48

## 2017-04-06 RX ADMIN — LORATADINE 5 MG: 10 TABLET ORAL at 21:11

## 2017-04-06 RX ADMIN — CYANOCOBALAMIN TAB 500 MCG 1000 MCG: 500 TAB at 08:49

## 2017-04-06 RX ADMIN — INSULIN LISPRO 2 UNITS: 100 INJECTION, SOLUTION INTRAVENOUS; SUBCUTANEOUS at 11:48

## 2017-04-06 RX ADMIN — LISINOPRIL 10 MG: 10 TABLET ORAL at 08:49

## 2017-04-06 RX ADMIN — METOPROLOL TARTRATE 50 MG: 50 TABLET ORAL at 21:10

## 2017-04-06 RX ADMIN — ATORVASTATIN CALCIUM 40 MG: 40 TABLET, FILM COATED ORAL at 16:32

## 2017-04-06 RX ADMIN — METFORMIN HYDROCHLORIDE 500 MG: 500 TABLET, FILM COATED ORAL at 08:49

## 2017-04-06 RX ADMIN — INSULIN HUMAN 22 UNITS: 100 INJECTION, SUSPENSION SUBCUTANEOUS at 16:32

## 2017-04-06 RX ADMIN — ENOXAPARIN SODIUM 40 MG: 40 INJECTION SUBCUTANEOUS at 08:49

## 2017-04-06 RX ADMIN — ASPIRIN 325 MG: 325 TABLET ORAL at 08:48

## 2017-04-06 RX ADMIN — METFORMIN HYDROCHLORIDE 500 MG: 500 TABLET, FILM COATED ORAL at 16:32

## 2017-04-07 VITALS
OXYGEN SATURATION: 99 % | TEMPERATURE: 97.8 F | SYSTOLIC BLOOD PRESSURE: 160 MMHG | DIASTOLIC BLOOD PRESSURE: 80 MMHG | BODY MASS INDEX: 31.29 KG/M2 | HEIGHT: 63 IN | WEIGHT: 176.59 LBS | HEART RATE: 60 BPM | RESPIRATION RATE: 20 BRPM

## 2017-04-07 LAB
GLUCOSE SERPL-MCNC: 124 MG/DL (ref 65–140)
GLUCOSE SERPL-MCNC: 142 MG/DL (ref 65–140)
GLUCOSE SERPL-MCNC: 256 MG/DL (ref 65–140)

## 2017-04-07 PROCEDURE — A9270 NON-COVERED ITEM OR SERVICE: HCPCS | Performed by: PHYSICAL MEDICINE & REHABILITATION

## 2017-04-07 PROCEDURE — 97116 GAIT TRAINING THERAPY: CPT

## 2017-04-07 PROCEDURE — 97535 SELF CARE MNGMENT TRAINING: CPT | Performed by: STUDENT IN AN ORGANIZED HEALTH CARE EDUCATION/TRAINING PROGRAM

## 2017-04-07 PROCEDURE — A9270 NON-COVERED ITEM OR SERVICE: HCPCS | Performed by: PHYSICIAN ASSISTANT

## 2017-04-07 PROCEDURE — A9270 NON-COVERED ITEM OR SERVICE: HCPCS | Performed by: NURSE PRACTITIONER

## 2017-04-07 PROCEDURE — 97530 THERAPEUTIC ACTIVITIES: CPT | Performed by: STUDENT IN AN ORGANIZED HEALTH CARE EDUCATION/TRAINING PROGRAM

## 2017-04-07 PROCEDURE — 82948 REAGENT STRIP/BLOOD GLUCOSE: CPT

## 2017-04-07 PROCEDURE — 97112 NEUROMUSCULAR REEDUCATION: CPT

## 2017-04-07 PROCEDURE — 97530 THERAPEUTIC ACTIVITIES: CPT

## 2017-04-07 PROCEDURE — 97110 THERAPEUTIC EXERCISES: CPT

## 2017-04-07 RX ORDER — METOPROLOL TARTRATE 50 MG/1
50 TABLET, FILM COATED ORAL EVERY 12 HOURS SCHEDULED
Qty: 60 TABLET | Refills: 3 | Status: SHIPPED | OUTPATIENT
Start: 2017-04-07 | End: 2020-02-10 | Stop reason: ALTCHOICE

## 2017-04-07 RX ORDER — HUMAN INSULIN 100 [IU]/ML
INJECTION, SUSPENSION SUBCUTANEOUS
Qty: 10 ML | Refills: 0
Start: 2017-04-07

## 2017-04-07 RX ORDER — ASPIRIN 325 MG
325 TABLET ORAL DAILY
Qty: 30 TABLET | Refills: 3 | Status: SHIPPED | OUTPATIENT
Start: 2017-04-07 | End: 2018-04-24 | Stop reason: CLARIF

## 2017-04-07 RX ORDER — LORATADINE 10 MG/1
5 TABLET ORAL DAILY
Qty: 15 TABLET | Refills: 3 | Status: SHIPPED | OUTPATIENT
Start: 2017-04-07 | End: 2019-10-15 | Stop reason: ALTCHOICE

## 2017-04-07 RX ORDER — ATORVASTATIN CALCIUM 40 MG/1
40 TABLET, FILM COATED ORAL
Qty: 30 TABLET | Refills: 3 | Status: SHIPPED | OUTPATIENT
Start: 2017-04-07 | End: 2019-01-07 | Stop reason: SDUPTHER

## 2017-04-07 RX ORDER — LISINOPRIL 20 MG/1
20 TABLET ORAL DAILY
Qty: 30 TABLET | Refills: 3 | Status: SHIPPED | OUTPATIENT
Start: 2017-04-07 | End: 2018-04-23

## 2017-04-07 RX ADMIN — METOPROLOL TARTRATE 50 MG: 50 TABLET ORAL at 08:42

## 2017-04-07 RX ADMIN — METFORMIN HYDROCHLORIDE 500 MG: 500 TABLET, FILM COATED ORAL at 08:41

## 2017-04-07 RX ADMIN — ATORVASTATIN CALCIUM 40 MG: 40 TABLET, FILM COATED ORAL at 16:58

## 2017-04-07 RX ADMIN — METFORMIN HYDROCHLORIDE 500 MG: 500 TABLET, FILM COATED ORAL at 16:58

## 2017-04-07 RX ADMIN — LISINOPRIL 20 MG: 20 TABLET ORAL at 08:42

## 2017-04-07 RX ADMIN — ASPIRIN 325 MG: 325 TABLET ORAL at 08:42

## 2017-04-07 RX ADMIN — CYANOCOBALAMIN TAB 500 MCG 1000 MCG: 500 TAB at 08:42

## 2017-04-07 RX ADMIN — INSULIN LISPRO 2 UNITS: 100 INJECTION, SOLUTION INTRAVENOUS; SUBCUTANEOUS at 12:21

## 2017-04-07 RX ADMIN — ENOXAPARIN SODIUM 40 MG: 40 INJECTION SUBCUTANEOUS at 08:42

## 2017-04-13 ENCOUNTER — ALLSCRIPTS OFFICE VISIT (OUTPATIENT)
Dept: OTHER | Facility: OTHER | Age: 77
End: 2017-04-13

## 2017-04-20 ENCOUNTER — ALLSCRIPTS OFFICE VISIT (OUTPATIENT)
Dept: OTHER | Facility: OTHER | Age: 77
End: 2017-04-20

## 2017-04-20 ENCOUNTER — GENERIC CONVERSION - ENCOUNTER (OUTPATIENT)
Dept: OTHER | Facility: OTHER | Age: 77
End: 2017-04-20

## 2017-05-01 ENCOUNTER — TRANSCRIBE ORDERS (OUTPATIENT)
Dept: LAB | Facility: HOSPITAL | Age: 77
End: 2017-05-01

## 2017-05-01 ENCOUNTER — APPOINTMENT (OUTPATIENT)
Dept: LAB | Facility: HOSPITAL | Age: 77
End: 2017-05-01
Attending: INTERNAL MEDICINE
Payer: COMMERCIAL

## 2017-05-01 DIAGNOSIS — E11.29 TYPE 2 DIABETES MELLITUS WITH OTHER DIABETIC KIDNEY COMPLICATION (HCC): ICD-10-CM

## 2017-05-01 DIAGNOSIS — N18.30 CHRONIC KIDNEY DISEASE, STAGE III (MODERATE) (HCC): ICD-10-CM

## 2017-05-01 LAB
CREAT UR-MCNC: 83.5 MG/DL
PROT UR-MCNC: 19 MG/DL
PROT/CREAT UR: 0.23 MG/G{CREAT} (ref 0–0.1)

## 2017-05-01 PROCEDURE — 84156 ASSAY OF PROTEIN URINE: CPT

## 2017-05-01 PROCEDURE — 82570 ASSAY OF URINE CREATININE: CPT

## 2017-05-02 ENCOUNTER — GENERIC CONVERSION - ENCOUNTER (OUTPATIENT)
Dept: OTHER | Facility: OTHER | Age: 77
End: 2017-05-02

## 2017-05-02 ENCOUNTER — APPOINTMENT (OUTPATIENT)
Dept: PHYSICAL THERAPY | Facility: CLINIC | Age: 77
End: 2017-05-02
Payer: COMMERCIAL

## 2017-05-02 PROCEDURE — G8978 MOBILITY CURRENT STATUS: HCPCS

## 2017-05-02 PROCEDURE — 97162 PT EVAL MOD COMPLEX 30 MIN: CPT

## 2017-05-02 PROCEDURE — G8979 MOBILITY GOAL STATUS: HCPCS

## 2017-05-03 ENCOUNTER — APPOINTMENT (OUTPATIENT)
Dept: PHYSICAL THERAPY | Facility: CLINIC | Age: 77
End: 2017-05-03
Payer: COMMERCIAL

## 2017-05-03 PROCEDURE — 97110 THERAPEUTIC EXERCISES: CPT

## 2017-05-03 PROCEDURE — 97112 NEUROMUSCULAR REEDUCATION: CPT

## 2017-05-04 DIAGNOSIS — N18.30 CHRONIC KIDNEY DISEASE, STAGE III (MODERATE) (HCC): ICD-10-CM

## 2017-05-04 DIAGNOSIS — I63.9 CVA (CEREBRAL VASCULAR ACCIDENT) (HCC): Primary | ICD-10-CM

## 2017-05-05 ENCOUNTER — ALLSCRIPTS OFFICE VISIT (OUTPATIENT)
Dept: OTHER | Facility: OTHER | Age: 77
End: 2017-05-05

## 2017-05-05 ENCOUNTER — GENERIC CONVERSION - ENCOUNTER (OUTPATIENT)
Dept: OTHER | Facility: OTHER | Age: 77
End: 2017-05-05

## 2017-05-09 ENCOUNTER — APPOINTMENT (OUTPATIENT)
Dept: PHYSICAL THERAPY | Facility: CLINIC | Age: 77
End: 2017-05-09
Payer: COMMERCIAL

## 2017-05-09 PROCEDURE — 97110 THERAPEUTIC EXERCISES: CPT

## 2017-05-09 PROCEDURE — 97112 NEUROMUSCULAR REEDUCATION: CPT

## 2017-05-12 ENCOUNTER — APPOINTMENT (OUTPATIENT)
Dept: PHYSICAL THERAPY | Facility: CLINIC | Age: 77
End: 2017-05-12
Payer: COMMERCIAL

## 2017-05-12 PROCEDURE — 97110 THERAPEUTIC EXERCISES: CPT

## 2017-05-12 PROCEDURE — 97112 NEUROMUSCULAR REEDUCATION: CPT

## 2017-05-15 ENCOUNTER — TRANSCRIBE ORDERS (OUTPATIENT)
Dept: LAB | Facility: HOSPITAL | Age: 77
End: 2017-05-15

## 2017-05-15 ENCOUNTER — APPOINTMENT (OUTPATIENT)
Dept: LAB | Facility: HOSPITAL | Age: 77
End: 2017-05-15
Payer: COMMERCIAL

## 2017-05-15 DIAGNOSIS — N18.30 CHRONIC KIDNEY DISEASE, STAGE III (MODERATE) (HCC): ICD-10-CM

## 2017-05-15 LAB
ANION GAP SERPL CALCULATED.3IONS-SCNC: 4 MMOL/L (ref 4–13)
BUN SERPL-MCNC: 15 MG/DL (ref 5–25)
CALCIUM SERPL-MCNC: 9.2 MG/DL (ref 8.3–10.1)
CHLORIDE SERPL-SCNC: 104 MMOL/L (ref 100–108)
CO2 SERPL-SCNC: 33 MMOL/L (ref 21–32)
CREAT SERPL-MCNC: 1.34 MG/DL (ref 0.6–1.3)
GFR SERPL CREATININE-BSD FRML MDRD: 51.8 ML/MIN/1.73SQ M
GLUCOSE P FAST SERPL-MCNC: 240 MG/DL (ref 65–99)
POTASSIUM SERPL-SCNC: 4.1 MMOL/L (ref 3.5–5.3)
SODIUM SERPL-SCNC: 141 MMOL/L (ref 136–145)

## 2017-05-15 PROCEDURE — 36415 COLL VENOUS BLD VENIPUNCTURE: CPT

## 2017-05-15 PROCEDURE — 80048 BASIC METABOLIC PNL TOTAL CA: CPT

## 2017-05-16 ENCOUNTER — APPOINTMENT (OUTPATIENT)
Dept: PHYSICAL THERAPY | Facility: CLINIC | Age: 77
End: 2017-05-16
Payer: COMMERCIAL

## 2017-05-16 PROCEDURE — 97112 NEUROMUSCULAR REEDUCATION: CPT

## 2017-05-16 PROCEDURE — 97110 THERAPEUTIC EXERCISES: CPT

## 2017-05-19 ENCOUNTER — APPOINTMENT (OUTPATIENT)
Dept: PHYSICAL THERAPY | Facility: CLINIC | Age: 77
End: 2017-05-19
Payer: COMMERCIAL

## 2017-05-19 PROCEDURE — 97112 NEUROMUSCULAR REEDUCATION: CPT

## 2017-05-19 PROCEDURE — 97110 THERAPEUTIC EXERCISES: CPT

## 2017-05-23 ENCOUNTER — APPOINTMENT (OUTPATIENT)
Dept: PHYSICAL THERAPY | Facility: CLINIC | Age: 77
End: 2017-05-23
Payer: COMMERCIAL

## 2017-05-23 PROCEDURE — 97110 THERAPEUTIC EXERCISES: CPT

## 2017-05-23 PROCEDURE — 97112 NEUROMUSCULAR REEDUCATION: CPT

## 2017-05-26 ENCOUNTER — APPOINTMENT (OUTPATIENT)
Dept: PHYSICAL THERAPY | Facility: CLINIC | Age: 77
End: 2017-05-26
Payer: COMMERCIAL

## 2017-05-26 ENCOUNTER — GENERIC CONVERSION - ENCOUNTER (OUTPATIENT)
Dept: OTHER | Facility: OTHER | Age: 77
End: 2017-05-26

## 2017-05-26 PROCEDURE — 97164 PT RE-EVAL EST PLAN CARE: CPT

## 2017-05-26 PROCEDURE — 97112 NEUROMUSCULAR REEDUCATION: CPT

## 2017-05-26 PROCEDURE — G8979 MOBILITY GOAL STATUS: HCPCS

## 2017-05-26 PROCEDURE — G8978 MOBILITY CURRENT STATUS: HCPCS

## 2017-05-30 ENCOUNTER — ALLSCRIPTS OFFICE VISIT (OUTPATIENT)
Dept: OTHER | Facility: OTHER | Age: 77
End: 2017-05-30

## 2017-05-31 ENCOUNTER — APPOINTMENT (OUTPATIENT)
Dept: PHYSICAL THERAPY | Facility: CLINIC | Age: 77
End: 2017-05-31
Payer: COMMERCIAL

## 2017-05-31 PROCEDURE — 97110 THERAPEUTIC EXERCISES: CPT

## 2017-05-31 PROCEDURE — 97112 NEUROMUSCULAR REEDUCATION: CPT

## 2017-06-01 ENCOUNTER — GENERIC CONVERSION - ENCOUNTER (OUTPATIENT)
Dept: OTHER | Facility: OTHER | Age: 77
End: 2017-06-01

## 2017-06-02 ENCOUNTER — OFFICE VISIT (OUTPATIENT)
Dept: PHYSICAL THERAPY | Facility: CLINIC | Age: 77
End: 2017-06-02
Payer: COMMERCIAL

## 2017-06-02 PROCEDURE — 97112 NEUROMUSCULAR REEDUCATION: CPT

## 2017-06-02 PROCEDURE — 97110 THERAPEUTIC EXERCISES: CPT

## 2017-06-05 ENCOUNTER — ALLSCRIPTS OFFICE VISIT (OUTPATIENT)
Dept: OTHER | Facility: OTHER | Age: 77
End: 2017-06-05

## 2017-06-06 ENCOUNTER — GENERIC CONVERSION - ENCOUNTER (OUTPATIENT)
Dept: OTHER | Facility: OTHER | Age: 77
End: 2017-06-06

## 2017-06-07 ENCOUNTER — APPOINTMENT (OUTPATIENT)
Dept: PHYSICAL THERAPY | Facility: CLINIC | Age: 77
End: 2017-06-07
Payer: COMMERCIAL

## 2017-06-09 ENCOUNTER — APPOINTMENT (OUTPATIENT)
Dept: LAB | Facility: HOSPITAL | Age: 77
End: 2017-06-09
Payer: COMMERCIAL

## 2017-06-09 ENCOUNTER — APPOINTMENT (OUTPATIENT)
Dept: PHYSICAL THERAPY | Facility: CLINIC | Age: 77
End: 2017-06-09
Payer: COMMERCIAL

## 2017-06-09 ENCOUNTER — GENERIC CONVERSION - ENCOUNTER (OUTPATIENT)
Dept: OTHER | Facility: OTHER | Age: 77
End: 2017-06-09

## 2017-06-09 ENCOUNTER — TRANSCRIBE ORDERS (OUTPATIENT)
Dept: LAB | Facility: HOSPITAL | Age: 77
End: 2017-06-09

## 2017-06-09 DIAGNOSIS — I48.0 PAROXYSMAL ATRIAL FIBRILLATION (HCC): ICD-10-CM

## 2017-06-09 DIAGNOSIS — Z79.01 LONG TERM (CURRENT) USE OF ANTICOAGULANTS: ICD-10-CM

## 2017-06-09 DIAGNOSIS — I48.0 PAROXYSMAL ATRIAL FIBRILLATION (HCC): Primary | ICD-10-CM

## 2017-06-09 LAB
INR PPP: 1.27 (ref 0.86–1.16)
PROTHROMBIN TIME: 16 SECONDS (ref 12.1–14.4)

## 2017-06-09 PROCEDURE — 36415 COLL VENOUS BLD VENIPUNCTURE: CPT

## 2017-06-09 PROCEDURE — 85610 PROTHROMBIN TIME: CPT

## 2017-06-12 ENCOUNTER — APPOINTMENT (OUTPATIENT)
Dept: LAB | Facility: HOSPITAL | Age: 77
End: 2017-06-12
Payer: COMMERCIAL

## 2017-06-12 ENCOUNTER — APPOINTMENT (OUTPATIENT)
Dept: PHYSICAL THERAPY | Facility: CLINIC | Age: 77
End: 2017-06-12
Payer: COMMERCIAL

## 2017-06-12 DIAGNOSIS — Z79.01 LONG TERM (CURRENT) USE OF ANTICOAGULANTS: ICD-10-CM

## 2017-06-12 DIAGNOSIS — I48.0 PAROXYSMAL ATRIAL FIBRILLATION (HCC): ICD-10-CM

## 2017-06-12 LAB
INR PPP: 1.74 (ref 0.86–1.16)
PROTHROMBIN TIME: 20.5 SECONDS (ref 12.1–14.4)

## 2017-06-12 PROCEDURE — 85610 PROTHROMBIN TIME: CPT

## 2017-06-12 PROCEDURE — 36415 COLL VENOUS BLD VENIPUNCTURE: CPT

## 2017-06-14 ENCOUNTER — APPOINTMENT (OUTPATIENT)
Dept: PHYSICAL THERAPY | Facility: CLINIC | Age: 77
End: 2017-06-14
Payer: COMMERCIAL

## 2017-06-15 ENCOUNTER — APPOINTMENT (OUTPATIENT)
Dept: LAB | Facility: HOSPITAL | Age: 77
End: 2017-06-15
Payer: COMMERCIAL

## 2017-06-15 ENCOUNTER — GENERIC CONVERSION - ENCOUNTER (OUTPATIENT)
Dept: OTHER | Facility: OTHER | Age: 77
End: 2017-06-15

## 2017-06-15 ENCOUNTER — TRANSCRIBE ORDERS (OUTPATIENT)
Dept: LAB | Facility: HOSPITAL | Age: 77
End: 2017-06-15

## 2017-06-15 DIAGNOSIS — Z79.899 ENCOUNTER FOR LONG-TERM (CURRENT) USE OF OTHER MEDICATIONS: ICD-10-CM

## 2017-06-15 DIAGNOSIS — Z79.899 HIGH RISK MEDICATION USE: Primary | ICD-10-CM

## 2017-06-15 DIAGNOSIS — Z79.899 HIGH RISK MEDICATION USE: ICD-10-CM

## 2017-06-15 LAB
INR PPP: 2.05 (ref 0.86–1.16)
PROTHROMBIN TIME: 23.3 SECONDS (ref 12.1–14.4)

## 2017-06-15 PROCEDURE — 36415 COLL VENOUS BLD VENIPUNCTURE: CPT

## 2017-06-15 PROCEDURE — 85610 PROTHROMBIN TIME: CPT

## 2017-06-16 ENCOUNTER — GENERIC CONVERSION - ENCOUNTER (OUTPATIENT)
Dept: OTHER | Facility: OTHER | Age: 77
End: 2017-06-16

## 2017-06-19 ENCOUNTER — APPOINTMENT (OUTPATIENT)
Dept: LAB | Facility: HOSPITAL | Age: 77
End: 2017-06-19
Payer: COMMERCIAL

## 2017-06-19 DIAGNOSIS — Z79.899 ENCOUNTER FOR LONG-TERM (CURRENT) USE OF OTHER MEDICATIONS: ICD-10-CM

## 2017-06-19 LAB
INR PPP: 2.63 (ref 0.86–1.16)
PROTHROMBIN TIME: 28.4 SECONDS (ref 12.1–14.4)

## 2017-06-19 PROCEDURE — 85610 PROTHROMBIN TIME: CPT

## 2017-06-19 PROCEDURE — 36415 COLL VENOUS BLD VENIPUNCTURE: CPT

## 2017-06-23 ENCOUNTER — TRANSCRIBE ORDERS (OUTPATIENT)
Dept: LAB | Facility: HOSPITAL | Age: 77
End: 2017-06-23

## 2017-06-23 ENCOUNTER — APPOINTMENT (OUTPATIENT)
Dept: LAB | Facility: HOSPITAL | Age: 77
End: 2017-06-23
Payer: COMMERCIAL

## 2017-06-23 DIAGNOSIS — Z79.01 LONG TERM (CURRENT) USE OF ANTICOAGULANTS: ICD-10-CM

## 2017-06-23 DIAGNOSIS — I48.0 PAROXYSMAL ATRIAL FIBRILLATION (HCC): Primary | ICD-10-CM

## 2017-06-23 DIAGNOSIS — I48.0 PAROXYSMAL ATRIAL FIBRILLATION (HCC): ICD-10-CM

## 2017-06-23 LAB
INR PPP: 2.53 (ref 0.86–1.16)
PROTHROMBIN TIME: 27.6 SECONDS (ref 12.1–14.4)

## 2017-06-23 PROCEDURE — 36415 COLL VENOUS BLD VENIPUNCTURE: CPT

## 2017-06-23 PROCEDURE — 85610 PROTHROMBIN TIME: CPT

## 2017-06-30 ENCOUNTER — APPOINTMENT (OUTPATIENT)
Dept: LAB | Facility: HOSPITAL | Age: 77
End: 2017-06-30
Payer: COMMERCIAL

## 2017-06-30 DIAGNOSIS — Z79.01 LONG TERM (CURRENT) USE OF ANTICOAGULANTS: ICD-10-CM

## 2017-06-30 DIAGNOSIS — I48.0 PAROXYSMAL ATRIAL FIBRILLATION (HCC): ICD-10-CM

## 2017-06-30 LAB
INR PPP: 2.58 (ref 0.86–1.16)
PROTHROMBIN TIME: 28 SECONDS (ref 12.1–14.4)

## 2017-06-30 PROCEDURE — 36415 COLL VENOUS BLD VENIPUNCTURE: CPT

## 2017-06-30 PROCEDURE — 85610 PROTHROMBIN TIME: CPT

## 2017-07-07 ENCOUNTER — TRANSCRIBE ORDERS (OUTPATIENT)
Dept: LAB | Facility: HOSPITAL | Age: 77
End: 2017-07-07

## 2017-07-07 ENCOUNTER — APPOINTMENT (OUTPATIENT)
Dept: LAB | Facility: HOSPITAL | Age: 77
End: 2017-07-07
Payer: COMMERCIAL

## 2017-07-07 DIAGNOSIS — I48.0 PAROXYSMAL ATRIAL FIBRILLATION (HCC): Primary | ICD-10-CM

## 2017-07-07 DIAGNOSIS — Z79.01 LONG TERM (CURRENT) USE OF ANTICOAGULANTS: ICD-10-CM

## 2017-07-07 DIAGNOSIS — I48.0 PAROXYSMAL ATRIAL FIBRILLATION (HCC): ICD-10-CM

## 2017-07-07 LAB
INR PPP: 2.56 (ref 0.86–1.16)
PROTHROMBIN TIME: 27.8 SECONDS (ref 12.1–14.4)

## 2017-07-07 PROCEDURE — 36415 COLL VENOUS BLD VENIPUNCTURE: CPT

## 2017-07-07 PROCEDURE — 85610 PROTHROMBIN TIME: CPT

## 2017-07-21 ENCOUNTER — APPOINTMENT (OUTPATIENT)
Dept: LAB | Facility: HOSPITAL | Age: 77
End: 2017-07-21
Payer: COMMERCIAL

## 2017-07-21 ENCOUNTER — TRANSCRIBE ORDERS (OUTPATIENT)
Dept: LAB | Facility: HOSPITAL | Age: 77
End: 2017-07-21

## 2017-07-21 DIAGNOSIS — I48.91 ATRIAL FIBRILLATION, UNSPECIFIED TYPE (HCC): Primary | ICD-10-CM

## 2017-07-21 DIAGNOSIS — I48.91 ATRIAL FIBRILLATION, UNSPECIFIED TYPE (HCC): ICD-10-CM

## 2017-07-21 DIAGNOSIS — Z79.01 LONG TERM (CURRENT) USE OF ANTICOAGULANTS: ICD-10-CM

## 2017-07-21 LAB
INR PPP: 1.96 (ref 0.86–1.16)
PROTHROMBIN TIME: 22.5 SECONDS (ref 12.1–14.4)

## 2017-07-21 PROCEDURE — 36415 COLL VENOUS BLD VENIPUNCTURE: CPT

## 2017-07-21 PROCEDURE — 85610 PROTHROMBIN TIME: CPT

## 2017-07-28 ENCOUNTER — APPOINTMENT (OUTPATIENT)
Dept: LAB | Facility: HOSPITAL | Age: 77
End: 2017-07-28
Payer: COMMERCIAL

## 2017-07-28 ENCOUNTER — TRANSCRIBE ORDERS (OUTPATIENT)
Dept: LAB | Facility: HOSPITAL | Age: 77
End: 2017-07-28

## 2017-07-28 DIAGNOSIS — Z79.01 LONG TERM (CURRENT) USE OF ANTICOAGULANTS: ICD-10-CM

## 2017-07-28 DIAGNOSIS — I48.0 PAROXYSMAL ATRIAL FIBRILLATION (HCC): ICD-10-CM

## 2017-07-28 DIAGNOSIS — I48.0 PAROXYSMAL ATRIAL FIBRILLATION (HCC): Primary | ICD-10-CM

## 2017-07-28 LAB
INR PPP: 1.91 (ref 0.86–1.16)
PROTHROMBIN TIME: 22.1 SECONDS (ref 12.1–14.4)

## 2017-07-28 PROCEDURE — 85610 PROTHROMBIN TIME: CPT

## 2017-07-28 PROCEDURE — 36415 COLL VENOUS BLD VENIPUNCTURE: CPT

## 2017-08-04 ENCOUNTER — APPOINTMENT (OUTPATIENT)
Dept: LAB | Facility: HOSPITAL | Age: 77
End: 2017-08-04
Payer: COMMERCIAL

## 2017-08-04 ENCOUNTER — TRANSCRIBE ORDERS (OUTPATIENT)
Dept: LAB | Facility: HOSPITAL | Age: 77
End: 2017-08-04

## 2017-08-04 DIAGNOSIS — Z79.01 LONG TERM (CURRENT) USE OF ANTICOAGULANTS: ICD-10-CM

## 2017-08-04 DIAGNOSIS — I48.0 PAROXYSMAL ATRIAL FIBRILLATION (HCC): ICD-10-CM

## 2017-08-04 DIAGNOSIS — I48.0 PAROXYSMAL ATRIAL FIBRILLATION (HCC): Primary | ICD-10-CM

## 2017-08-04 LAB
INR PPP: 2.15 (ref 0.86–1.16)
PROTHROMBIN TIME: 24.2 SECONDS (ref 12.1–14.4)

## 2017-08-04 PROCEDURE — 85610 PROTHROMBIN TIME: CPT

## 2017-08-04 PROCEDURE — 36415 COLL VENOUS BLD VENIPUNCTURE: CPT

## 2017-08-11 ENCOUNTER — APPOINTMENT (OUTPATIENT)
Dept: LAB | Facility: CLINIC | Age: 77
End: 2017-08-11
Payer: COMMERCIAL

## 2017-08-11 DIAGNOSIS — I48.0 PAROXYSMAL ATRIAL FIBRILLATION (HCC): ICD-10-CM

## 2017-08-11 DIAGNOSIS — Z79.01 LONG TERM (CURRENT) USE OF ANTICOAGULANTS: ICD-10-CM

## 2017-08-11 LAB
INR PPP: 2.63 (ref 0.86–1.16)
PROTHROMBIN TIME: 28.4 SECONDS (ref 12.1–14.4)

## 2017-08-11 PROCEDURE — 36415 COLL VENOUS BLD VENIPUNCTURE: CPT

## 2017-08-11 PROCEDURE — 85610 PROTHROMBIN TIME: CPT

## 2017-08-18 ENCOUNTER — GENERIC CONVERSION - ENCOUNTER (OUTPATIENT)
Dept: OTHER | Facility: OTHER | Age: 77
End: 2017-08-18

## 2017-08-25 ENCOUNTER — APPOINTMENT (OUTPATIENT)
Dept: LAB | Facility: CLINIC | Age: 77
End: 2017-08-25
Payer: COMMERCIAL

## 2017-08-25 ENCOUNTER — TRANSCRIBE ORDERS (OUTPATIENT)
Dept: LAB | Facility: CLINIC | Age: 77
End: 2017-08-25

## 2017-08-25 DIAGNOSIS — Z79.01 LONG TERM (CURRENT) USE OF ANTICOAGULANTS: ICD-10-CM

## 2017-08-25 DIAGNOSIS — I48.0 PAROXYSMAL ATRIAL FIBRILLATION (HCC): Primary | ICD-10-CM

## 2017-08-25 LAB
INR PPP: 2.48 (ref 0.86–1.16)
PROTHROMBIN TIME: 27.1 SECONDS (ref 12.1–14.4)

## 2017-08-25 PROCEDURE — 36415 COLL VENOUS BLD VENIPUNCTURE: CPT

## 2017-08-25 PROCEDURE — 85610 PROTHROMBIN TIME: CPT

## 2017-08-29 ENCOUNTER — ALLSCRIPTS OFFICE VISIT (OUTPATIENT)
Dept: OTHER | Facility: OTHER | Age: 77
End: 2017-08-29

## 2017-08-29 DIAGNOSIS — R10.9 ABDOMINAL PAIN: ICD-10-CM

## 2017-08-29 DIAGNOSIS — M54.50 LOW BACK PAIN: ICD-10-CM

## 2017-08-29 LAB
BILIRUB UR QL STRIP: NORMAL
CLARITY UR: NORMAL
COLOR UR: YELLOW
GLUCOSE (HISTORICAL): NORMAL
HGB UR QL STRIP.AUTO: NORMAL
KETONES UR STRIP-MCNC: NORMAL MG/DL
LEUKOCYTE ESTERASE UR QL STRIP: NORMAL
NITRITE UR QL STRIP: NORMAL
PH UR STRIP.AUTO: 6 [PH]
PROT UR STRIP-MCNC: NORMAL MG/DL
SP GR UR STRIP.AUTO: 1.02
UROBILINOGEN UR QL STRIP.AUTO: NORMAL

## 2017-08-30 ENCOUNTER — TRANSCRIBE ORDERS (OUTPATIENT)
Dept: ADMINISTRATIVE | Facility: HOSPITAL | Age: 77
End: 2017-08-30

## 2017-08-30 ENCOUNTER — APPOINTMENT (OUTPATIENT)
Dept: LAB | Facility: HOSPITAL | Age: 77
End: 2017-08-30
Payer: COMMERCIAL

## 2017-08-30 DIAGNOSIS — M54.50 ACUTE MIDLINE LOW BACK PAIN WITHOUT SCIATICA: ICD-10-CM

## 2017-08-30 DIAGNOSIS — R10.9 ABDOMINAL PAIN: ICD-10-CM

## 2017-08-30 DIAGNOSIS — I48.0 PAROXYSMAL ATRIAL FIBRILLATION (HCC): ICD-10-CM

## 2017-08-30 DIAGNOSIS — R10.9 LEFT LATERAL ABDOMINAL PAIN: Primary | ICD-10-CM

## 2017-08-30 DIAGNOSIS — M54.50 LOW BACK PAIN: ICD-10-CM

## 2017-08-30 PROCEDURE — 87086 URINE CULTURE/COLONY COUNT: CPT

## 2017-08-31 LAB — BACTERIA UR CULT: NORMAL

## 2017-09-01 ENCOUNTER — HOSPITAL ENCOUNTER (OUTPATIENT)
Dept: RADIOLOGY | Facility: HOSPITAL | Age: 77
Discharge: HOME | End: 2017-09-01
Payer: COMMERCIAL

## 2017-09-01 ENCOUNTER — GENERIC CONVERSION - ENCOUNTER (OUTPATIENT)
Dept: OTHER | Facility: OTHER | Age: 77
End: 2017-09-01

## 2017-09-01 ENCOUNTER — APPOINTMENT (OUTPATIENT)
Dept: LAB | Facility: CLINIC | Age: 77
End: 2017-09-01
Payer: COMMERCIAL

## 2017-09-01 DIAGNOSIS — N18.30 CHRONIC KIDNEY DISEASE, STAGE III (MODERATE) (HCC): ICD-10-CM

## 2017-09-01 DIAGNOSIS — R10.9 LEFT LATERAL ABDOMINAL PAIN: ICD-10-CM

## 2017-09-01 LAB
ANION GAP SERPL CALCULATED.3IONS-SCNC: 6 MMOL/L (ref 4–13)
BUN SERPL-MCNC: 22 MG/DL (ref 5–25)
CALCIUM SERPL-MCNC: 9.6 MG/DL (ref 8.3–10.1)
CHLORIDE SERPL-SCNC: 108 MMOL/L (ref 100–108)
CO2 SERPL-SCNC: 29 MMOL/L (ref 21–32)
CREAT SERPL-MCNC: 1.41 MG/DL (ref 0.6–1.3)
GFR SERPL CREATININE-BSD FRML MDRD: 48 ML/MIN/1.73SQ M
GLUCOSE P FAST SERPL-MCNC: 125 MG/DL (ref 65–99)
POTASSIUM SERPL-SCNC: 4.3 MMOL/L (ref 3.5–5.3)
SODIUM SERPL-SCNC: 143 MMOL/L (ref 136–145)

## 2017-09-01 PROCEDURE — 36415 COLL VENOUS BLD VENIPUNCTURE: CPT

## 2017-09-01 PROCEDURE — 80048 BASIC METABOLIC PNL TOTAL CA: CPT

## 2017-09-01 PROCEDURE — 74176 CT ABD & PELVIS W/O CONTRAST: CPT

## 2017-09-13 ENCOUNTER — GENERIC CONVERSION - ENCOUNTER (OUTPATIENT)
Dept: OTHER | Facility: OTHER | Age: 77
End: 2017-09-13

## 2017-09-22 ENCOUNTER — APPOINTMENT (OUTPATIENT)
Dept: LAB | Facility: CLINIC | Age: 77
End: 2017-09-22
Payer: COMMERCIAL

## 2017-09-22 DIAGNOSIS — I48.0 PAROXYSMAL ATRIAL FIBRILLATION (HCC): ICD-10-CM

## 2017-09-22 LAB
INR PPP: 1.66 (ref 0.86–1.16)
PROTHROMBIN TIME: 19.7 SECONDS (ref 12.1–14.4)

## 2017-09-22 PROCEDURE — 85610 PROTHROMBIN TIME: CPT

## 2017-09-22 PROCEDURE — 36415 COLL VENOUS BLD VENIPUNCTURE: CPT

## 2017-09-29 ENCOUNTER — TRANSCRIBE ORDERS (OUTPATIENT)
Dept: LAB | Facility: CLINIC | Age: 77
End: 2017-09-29

## 2017-09-29 ENCOUNTER — APPOINTMENT (OUTPATIENT)
Dept: LAB | Facility: CLINIC | Age: 77
End: 2017-09-29
Payer: COMMERCIAL

## 2017-09-29 DIAGNOSIS — I48.0 PAROXYSMAL ATRIAL FIBRILLATION (HCC): Primary | ICD-10-CM

## 2017-09-29 DIAGNOSIS — I48.0 PAROXYSMAL ATRIAL FIBRILLATION (HCC): ICD-10-CM

## 2017-09-29 LAB
INR PPP: 2.58 (ref 0.86–1.16)
PROTHROMBIN TIME: 28 SECONDS (ref 12.1–14.4)

## 2017-09-29 PROCEDURE — 36415 COLL VENOUS BLD VENIPUNCTURE: CPT

## 2017-09-29 PROCEDURE — 85610 PROTHROMBIN TIME: CPT

## 2017-10-06 ENCOUNTER — APPOINTMENT (OUTPATIENT)
Dept: LAB | Facility: CLINIC | Age: 77
End: 2017-10-06
Payer: COMMERCIAL

## 2017-10-06 DIAGNOSIS — I48.0 PAROXYSMAL ATRIAL FIBRILLATION (HCC): ICD-10-CM

## 2017-10-06 LAB
INR PPP: 2.72 (ref 0.86–1.16)
PROTHROMBIN TIME: 29.2 SECONDS (ref 12.1–14.4)

## 2017-10-06 PROCEDURE — 36415 COLL VENOUS BLD VENIPUNCTURE: CPT

## 2017-10-06 PROCEDURE — 85610 PROTHROMBIN TIME: CPT

## 2017-10-16 ENCOUNTER — TRANSCRIBE ORDERS (OUTPATIENT)
Dept: RADIOLOGY | Facility: HOSPITAL | Age: 77
End: 2017-10-16

## 2017-10-16 ENCOUNTER — ALLSCRIPTS OFFICE VISIT (OUTPATIENT)
Dept: OTHER | Facility: OTHER | Age: 77
End: 2017-10-16

## 2017-10-16 ENCOUNTER — HOSPITAL ENCOUNTER (OUTPATIENT)
Dept: RADIOLOGY | Facility: HOSPITAL | Age: 77
Discharge: HOME | End: 2017-10-16
Attending: ANESTHESIOLOGY
Payer: COMMERCIAL

## 2017-10-16 DIAGNOSIS — I48.0 PAROXYSMAL ATRIAL FIBRILLATION (HCC): ICD-10-CM

## 2017-10-16 DIAGNOSIS — Z79.01 LONG-TERM (CURRENT) USE OF ANTICOAGULANTS: Primary | ICD-10-CM

## 2017-10-16 DIAGNOSIS — M54.50 LOW BACK PAIN: ICD-10-CM

## 2017-10-16 PROCEDURE — 72110 X-RAY EXAM L-2 SPINE 4/>VWS: CPT

## 2017-10-18 ENCOUNTER — GENERIC CONVERSION - ENCOUNTER (OUTPATIENT)
Dept: OTHER | Facility: OTHER | Age: 77
End: 2017-10-18

## 2017-10-19 ENCOUNTER — GENERIC CONVERSION - ENCOUNTER (OUTPATIENT)
Dept: OTHER | Facility: OTHER | Age: 77
End: 2017-10-19

## 2017-10-20 ENCOUNTER — APPOINTMENT (OUTPATIENT)
Dept: LAB | Facility: CLINIC | Age: 77
End: 2017-10-20
Payer: COMMERCIAL

## 2017-10-20 ENCOUNTER — GENERIC CONVERSION - ENCOUNTER (OUTPATIENT)
Dept: OTHER | Facility: OTHER | Age: 77
End: 2017-10-20

## 2017-10-20 DIAGNOSIS — I48.0 PAROXYSMAL ATRIAL FIBRILLATION (HCC): ICD-10-CM

## 2017-10-20 DIAGNOSIS — Z79.01 LONG-TERM (CURRENT) USE OF ANTICOAGULANTS: ICD-10-CM

## 2017-10-20 LAB
INR PPP: 2.2 (ref 0.86–1.16)
PROTHROMBIN TIME: 24.7 SECONDS (ref 12.1–14.4)

## 2017-10-20 PROCEDURE — 85610 PROTHROMBIN TIME: CPT

## 2017-10-20 PROCEDURE — 36415 COLL VENOUS BLD VENIPUNCTURE: CPT

## 2017-10-31 ENCOUNTER — GENERIC CONVERSION - ENCOUNTER (OUTPATIENT)
Dept: OTHER | Facility: OTHER | Age: 77
End: 2017-10-31

## 2017-11-01 NOTE — CONSULTS
Assessment  Assessed    1  Left low back pain (724 2) (M54 5)   2  Myofascial pain (729 1) (M79 1)    Plan  Left low back pain    · * XR SPINE LUMBAR MINIMUM 4 VIEWS NON INJURY; Status:Active; Requestedfor:16Oct2017;    Perform:[facility]iology; Due:16Oct2018; Ordered;low back pain; Ordered By:Jason Gould;  70-year-old male with a history of diabetes, CAD status post CABG, and CVA on anticoagulation present for initial consultation regarding left-sided axial lumbosacral back pain without any radicular symptoms into his lower extremities  The pain seems to be mechanical in nature and is likely myofascial and facet mediated  He does not have any signs of radiculopathy or myelopathy on physical exam  He does have muscle spasms of the left lumbar paraspinal musculature and quadratus lumbar muscles  He does not have any imaging of his lumbar spine at this time and has not done any dedicated physical therapy for his low back, nor is he interested at this time  He is managing his pain with Tylenol and BenGay with good relief  #1 I will order an x-ray of the patient's lumbar spine #2 I offered physical therapy, however the patient declined #3 the patient may continue with Tylenol 650 mg every 8 hours when necessary and should not exceed 3000 mg in 24 hours  #4 we will avoid NSAIDs secondary to anticoagulation #5 the patient may consider over-the-counter lidocaine patches to the affected area and there to be used as directed on the package #6 the patient may use a heating pad to the affected area and he should apply for 20 minutes on and at least 20 minutes off to avoid thermal injury to the skin #7 the patient may benefit from lumbar medial branch blocks, however we will await imaging results #8 I will follow-up with the patient in 8 weeks   Chief Complaint  Chief Complaints    1   Back Pain  Low back pain      History of Present Illness  70-year-old male with a history of diabetes, CAD status post CABG, and CVA on warfarin presenting for initial consultation regarding axial left-sided low back pain  The patient denies any radicular symptoms into his lower extremities including numbness, paresthesias, or subjective weakness  He denies any bladder or bowel incontinence or saddle anesthesia  He denies any trauma or inciting event and has been dealing with this pain for the past 6 months  The patient states that the pain only occurs when he sleeps in his recliner  The patient states that because of postnasal drip he will wake up in the middle of the night after sleeping in bed for about 4 hours and then sleep the rest of the night in his recliner  Upon doing so, he wakes up with the left-sided low back pain  The patient did have a CT of his abdomen and pelvis as there was concern for nephrolithiasis, However the CT scan only revealed numerous bilateral nonobstructing renal calculi and there is no obstructive uropathy  The patient denies any urinary complaints including dysuria, hematuria, etc  He does not have any imaging of his lumbar spine  He has not done any formal physical therapy for his low back yet  He is currently using Tylenol daily when necessary and this gives him excellent relief  He also uses BenGay as well with moderate relief  patient rates his pain an 8 out of 10 and the pain is nearly constant  The pain is worse in the afternoon  The pain is described as shooting, sharp, and throbbing  The pain is decreased with relaxation  The pain is increased with standing, bending at the waist, sitting, walking, exercise, and coughing/sneezing  He has not gotten any relief from physical therapy in the past have personally reviewed and/or updated the patient's past medical history, past surgical history, family history, social history, allergies, and vital signs today   Other than as stated above, the patient denies any interval changes in medications, medical condition, mental condition, symptoms, or allergies since the last office visit  Referring physician is  Dr Srinivasan Lacy  Primary Care physician is  Estela Trujillo presents with complaints of constant episodes of left lower back pain, described as sharp and throbbing  On a scale of 1 to 10, the patient rates the pain as 8  Review of Systems   Constitutional: no fever,-- no recent weight gain-- and-- no recent weight loss  Eyes: no double vision-- and-- no blurry vision  Cardiovascular: no chest pain,-- no palpitations-- and-- no lower extremity edema  Respiratory: no complaints of shortness of breath-- and-- no wheezing  Musculoskeletal: difficulty walking-- and-- decreased range of motion, but-- no muscle weakness,-- no joint stiffness,-- no joint swelling,-- no limb swelling-- and-- no pain in extremity  Neurological: no dizziness,-- no difficulty swallowing,-- no memory loss,-- no loss of consciousness-- and-- no seizures  Gastrointestinal: diarrhea, but-- no nausea,-- no vomiting-- and-- no constipation  Genitourinary: no difficulty initiating urine stream,-- no genital pain-- and-- no frequent urination  Integumentary: no complaints of skin rash  Psychiatric: no depression  Endocrine: no excessive thirst,-- no adrenal disease,-- no hypothyroidism-- and-- no hyperthyroidism  Hematologic/Lymphatic: a tendency for easy bruising, but-- no tendency for easy bleeding  ROS reviewed  Active Problems  Problems    1  Actinic keratosis (702 0) (L57 0)   2  Allergic rhinitis, unspecified allergic rhinitis type   3  Asymptomatic carotid artery stenosis (433 10) (I65 29)   4  Benign essential hypertension (401 1) (I10)   5  Cerebrovascular accident (CVA) (434 91) (I63 9)   6  Chronic kidney disease, stage 3 (585 3) (N18 3)   7  Controlled diabetes mellitus with kidney complication (367 19) (L84 41)   8  Coronary artery disease (414 00) (I25 10)   9  Diabetes Mellitus (250 00)   10  Diabetic Peripheral Neuropathy (357 2)   11   Erectile dysfunction of non-organic origin (302 72) (F52 21)   12  Gait instability (781 2) (R26 81)   13  Hospital discharge follow-up (V67 59) (Z09)   14  Hyperlipidemia (272 4) (E78 5)   15  Left lateral abdominal pain (789 09) (R10 9)   16  Left low back pain (724 2) (M54 5)   17  Medicare annual wellness visit, initial (V70 0) (Z00 00)   18  Medicare annual wellness visit, subsequent (V70 0) (Z00 00)   19  Myelomalacia (336 8) (G95 89)   20  Need for pneumococcal vaccination (V03 82) (Z23)   21  Need for vaccination with 13-polyvalent pneumococcal conjugate vaccine (V03 82) (Z23)   22  Nephrolithiasis (592 0) (N20 0)   23  Obstructive sleep apnea (327 23) (G47 33)   24  Osteoarthritis (715 90) (M19 90)   25  Peripheral vascular disease (443 9) (I73 9)   26  Post-nasal drip (784 91) (R09 82)   27  Pulmonary nodule seen on imaging study (793 11) (R91 1)   28  Screening for colorectal cancer (V76 51) (Z12 11,Z12 12)   29  Type 2 diabetes mellitus (250 00) (E11 9)    Past Medical History  Problems    1  History of Acute renal failure (584 9) (N17 9)   2  Cerebrovascular accident (CVA) (434 91) (I63 9)   3  Controlled diabetes mellitus with kidney complication (648 72) (U89 79)   4  Coronary artery disease (414 00) (I25 10)   5  History of acute renal failure (V13 09) (Z87 448)   6  History of hematuria (V13 09) (Z87 448)   7  History of hypercholesterolemia (V12 29) (Z86 39)   8  History of syncope (V15 89) (Z87 898)   9  History of upper respiratory infection (V12 09) (Z87 09)   10  History of Hyperkalemia (276 7) (E87 5)   11  History of Microalbuminuria (791 0) (R80 9)   12  History of Obstructive Uropathy (599 60)   13  History of Respiratory condition due to external agent (508 9) (J70 9)   14  History of Synovial cyst of popliteal space (727 51) (M71 20)   15  History of Vitamin D deficiency (268 9) (E55 9)    Surgical History  Problems    1  History of CABG   2  History of Colonoscopy (Fiberoptic)   3  History of Diagnostic Cystoscopy   4  History of Elbow Surgery   5  History of Elective Circumcision   6  History of Nephrostomy With Drainage   7  History of Open Treatment Of Humeral Greater Tuberosity Fracture   8  History of Renal Lithotripsy   9  History of Surgery Vas Deferens Vasectomy    Family History  Mother    1  Family history of Acute Myocardial Infarction (V17 3)   2  Family history of Coronary Artery Disease (V17 49)  Father    3  Family history of emphysema (V17 6) (Z82 5)  Brother    4  Family history of leukemia (V16 6) (Z80 6)  Maternal Grandfather    5  Family history of Diabetes Mellitus (V18 0)  Unknown    6  Family history of cerebrovascular accident (CVA) (V17 1) (Z82 3)   7  Family history of hypertension (V17 49) (Z82 49)    Social History  Problems    · Denied: History of Alcohol   · Daily caffeine consumption, 1 serving a day   · Denied: Drug use (305 90) (F19 90)   · Former smoker (V15 82) (M52 220)    Current Meds   1  Aspirin 325 MG Oral Tablet; TAKE 1 TABLET DAILY; Therapy: 29PZY7852 to (Evaluate:17Oct2017); Last Rx:20Apr2017 Ordered   2  Atorvastatin Calcium 40 MG Oral Tablet; Take 1 tablet daily; Last Rx:20Apr2017 Ordered   3  Claritin 10 MG Oral Tablet; TAKE 1/2 TABLET DAILY; Therapy: (Recorded:30May2017) to Recorded   4  Lisinopril 40 MG Oral Tablet; TAKE 1 TABLET DAILY; Therapy: 22YWN8158 to (Evaluate:59Fme9801)  Requested for: 31Xkt5967; Last Rx:05May2017 Ordered   5  MetFORMIN HCl - 500 MG Oral Tablet; take 1 tablet every twelve hours; Therapy: 81QBU8649 to (26 442844)  Requested for: 29Vnw3207; Last Rx:98Izb0802 Ordered   6  Metoprolol Tartrate 50 MG Oral Tablet; take one tablet by mouth twice daily; Therapy: 18CXH1473 to (Evaluate:13May2017)  Requested for: 85Fgr9419; Last Rx:14Nov2016 Ordered   7  NovoLIN N 100 UNIT/ML Subcutaneous Suspension; Inject 55 units in the morning before breakfast and 30 units in the evening before dinner or as directed; Therapy: 62NAA9384 to (Evaluate:01Nov2015);  Last PC:59EQM1292 Ordered   8  Vitamin B-12 1000 MCG Oral Tablet; TAKE 1 TABLET DAILY AS DIRECTED; Therapy: 17UXD5184 to (Evaluate:61Ent1823); Last Rx:42Yyf9047 Ordered   9  Warfarin Sodium 5 MG Oral Tablet; TAKE as instructed; Therapy: 64Cxl1278 to (Evaluate:59Ppb3930); Last Rx:22Kdm4082 Ordered    Allergies  Medication    1  FLU    Vitals  Vital Signs    Recorded: 75GLG2256 11:10AM   Temperature 97 6 F   Heart Rate 66   Respiration 20   Systolic 521   Diastolic 56   Height 5 ft 5 5 in   Weight 188 lb    BMI Calculated 30 81   BSA Calculated 1 94   Pain Scale 8       Physical Exam   Constitutional  General appearance: Well developed, well nourished, alert, in no distress, non-toxic and no overt pain behavior  Eyes  Sclera: anicteric  HEENT  Hearing grossly intact  Neck  Neck: Supple, symmetric, trachea midline, no masses  Pulmonary  Respiratory effort: Even and unlabored  Abdomen  Abdomen: Soft, non-tender, non-distended  Skin  Skin and subcutaneous tissue: Normal without rashes or lesions, well hydrated  Psychiatric  Mood and affect: Mood and affect appropriate  Neurologic  the muscle tone was normal  Musculoskeletal  Gait and station: Abnormal  -- Ambulates with a walker  Lumbar/Sacral Spine examination demonstrates Lumbosacral Spine:  Appearance: Normal   Tenderness: left paraspinal  Palpatory Findings include bilateral muscle spasms  Lumbosacral Spine Sensory: intact to light touch and pinprick in the lower extremities  ROM Lumbosacral Spine: Full except as noted: Extension was restricted-- and-- was painful  Left lateral flexion was restricted  Right lateral flexion was restricted  ROM Hips: Full except as noted: Left hip internal rotation was restricted-- and-- was not painful  Right hip internal rotation was restricted-- and-- was not painful  Foot and ankle strength was normal bilaterally  Knee strength was normal bilaterally  Hip strength was normal on the left side    Right Hip Flexion: 4/5   Right Hip Extension: 5/5  Right Hip Abduction: 5/5  Right Hip Adduction: 5/5  Evaluation of Muscle Stretch Reflexes on the right side demonstrates muscle stretch reflexes equal and symmetric right lower limbs-- and-- negative right ankle clonus  Evaluation of Muscle Stretch Reflexes on the left side demonstrates negative left ankle clonus, but-- muscle stretch reflexes equal and symmetric right lower limbs  Special Tests: negative Straight Leg Raise on right,-- negative Straight Leg Raise on left,-- negative Terrance's Maneuver on right-- and-- negative Terrance's Maneuver on lef  Results/Data  Procedure Flowsheet 59DHW9801 11:05AM      Test Name Result Flag Reference   Oswestry Score 48         Results    I personally reviewed the films/images in the office today        Future Appointments    Date/Time Provider Specialty Site   12/05/2017 09:20 AM Maryann Salazar MD Family Medicine [address] [place] [address]   12/13/2017 02:00 PM RYAN Mcdonald [facility] [address]       Signatures   Electronically signed by : Magi Tate DO; Oct 16 2017 12:34PM EST                       (Author)

## 2017-11-03 ENCOUNTER — GENERIC CONVERSION - ENCOUNTER (OUTPATIENT)
Dept: OTHER | Facility: OTHER | Age: 77
End: 2017-11-03

## 2017-11-16 ENCOUNTER — GENERIC CONVERSION - ENCOUNTER (OUTPATIENT)
Dept: OTHER | Facility: OTHER | Age: 77
End: 2017-11-16

## 2017-11-17 ENCOUNTER — APPOINTMENT (OUTPATIENT)
Dept: LAB | Facility: CLINIC | Age: 77
End: 2017-11-17
Payer: COMMERCIAL

## 2017-11-17 ENCOUNTER — TRANSCRIBE ORDERS (OUTPATIENT)
Dept: LAB | Facility: CLINIC | Age: 77
End: 2017-11-17

## 2017-11-17 DIAGNOSIS — Z79.01 LONG-TERM (CURRENT) USE OF ANTICOAGULANTS: Primary | ICD-10-CM

## 2017-11-17 DIAGNOSIS — Z79.01 LONG-TERM (CURRENT) USE OF ANTICOAGULANTS: ICD-10-CM

## 2017-11-17 LAB
INR PPP: 2.26 (ref 0.86–1.16)
PROTHROMBIN TIME: 25.2 SECONDS (ref 12.1–14.4)

## 2017-11-17 PROCEDURE — 36415 COLL VENOUS BLD VENIPUNCTURE: CPT

## 2017-11-17 PROCEDURE — 85610 PROTHROMBIN TIME: CPT

## 2017-11-24 ENCOUNTER — APPOINTMENT (OUTPATIENT)
Dept: LAB | Facility: CLINIC | Age: 77
End: 2017-11-24
Payer: COMMERCIAL

## 2017-11-24 DIAGNOSIS — Z79.01 LONG-TERM (CURRENT) USE OF ANTICOAGULANTS: ICD-10-CM

## 2017-11-24 LAB
INR PPP: 2.61 (ref 0.86–1.16)
PROTHROMBIN TIME: 28.3 SECONDS (ref 12.1–14.4)

## 2017-11-24 PROCEDURE — 36415 COLL VENOUS BLD VENIPUNCTURE: CPT

## 2017-11-24 PROCEDURE — 85610 PROTHROMBIN TIME: CPT

## 2017-12-01 ENCOUNTER — APPOINTMENT (OUTPATIENT)
Dept: LAB | Facility: CLINIC | Age: 77
End: 2017-12-01
Payer: COMMERCIAL

## 2017-12-01 DIAGNOSIS — N18.30 CHRONIC KIDNEY DISEASE, STAGE III (MODERATE) (HCC): ICD-10-CM

## 2017-12-01 LAB
ALBUMIN SERPL BCP-MCNC: 3.5 G/DL (ref 3.5–5)
ANION GAP SERPL CALCULATED.3IONS-SCNC: 6 MMOL/L (ref 4–13)
BASOPHILS # BLD AUTO: 0.03 THOUSANDS/ΜL (ref 0–0.1)
BASOPHILS NFR BLD AUTO: 0 % (ref 0–1)
BUN SERPL-MCNC: 21 MG/DL (ref 5–25)
CALCIUM SERPL-MCNC: 9.3 MG/DL (ref 8.3–10.1)
CHLORIDE SERPL-SCNC: 109 MMOL/L (ref 100–108)
CO2 SERPL-SCNC: 27 MMOL/L (ref 21–32)
CREAT SERPL-MCNC: 1.44 MG/DL (ref 0.6–1.3)
CREAT UR-MCNC: 97.5 MG/DL
EOSINOPHIL # BLD AUTO: 0.13 THOUSAND/ΜL (ref 0–0.61)
EOSINOPHIL NFR BLD AUTO: 2 % (ref 0–6)
ERYTHROCYTE [DISTWIDTH] IN BLOOD BY AUTOMATED COUNT: 14.5 % (ref 11.6–15.1)
GFR SERPL CREATININE-BSD FRML MDRD: 47 ML/MIN/1.73SQ M
GLUCOSE P FAST SERPL-MCNC: 180 MG/DL (ref 65–99)
HCT VFR BLD AUTO: 42.2 % (ref 36.5–49.3)
HGB BLD-MCNC: 14.2 G/DL (ref 12–17)
LYMPHOCYTES # BLD AUTO: 2.03 THOUSANDS/ΜL (ref 0.6–4.47)
LYMPHOCYTES NFR BLD AUTO: 23 % (ref 14–44)
MCH RBC QN AUTO: 28 PG (ref 26.8–34.3)
MCHC RBC AUTO-ENTMCNC: 33.6 G/DL (ref 31.4–37.4)
MCV RBC AUTO: 83 FL (ref 82–98)
MONOCYTES # BLD AUTO: 0.84 THOUSAND/ΜL (ref 0.17–1.22)
MONOCYTES NFR BLD AUTO: 10 % (ref 4–12)
NEUTROPHILS # BLD AUTO: 5.79 THOUSANDS/ΜL (ref 1.85–7.62)
NEUTS SEG NFR BLD AUTO: 65 % (ref 43–75)
NRBC BLD AUTO-RTO: 0 /100 WBCS
PHOSPHATE SERPL-MCNC: 2.7 MG/DL (ref 2.3–4.1)
PLATELET # BLD AUTO: 216 THOUSANDS/UL (ref 149–390)
PMV BLD AUTO: 10.8 FL (ref 8.9–12.7)
POTASSIUM SERPL-SCNC: 4.3 MMOL/L (ref 3.5–5.3)
PROT UR-MCNC: 48 MG/DL
PROT/CREAT UR: 0.49 MG/G{CREAT} (ref 0–0.1)
PTH-INTACT SERPL-MCNC: 22.6 PG/ML (ref 14–72)
RBC # BLD AUTO: 5.07 MILLION/UL (ref 3.88–5.62)
SODIUM SERPL-SCNC: 142 MMOL/L (ref 136–145)
WBC # BLD AUTO: 8.84 THOUSAND/UL (ref 4.31–10.16)

## 2017-12-01 PROCEDURE — 80069 RENAL FUNCTION PANEL: CPT

## 2017-12-01 PROCEDURE — 85025 COMPLETE CBC W/AUTO DIFF WBC: CPT

## 2017-12-01 PROCEDURE — 36415 COLL VENOUS BLD VENIPUNCTURE: CPT

## 2017-12-01 PROCEDURE — 84156 ASSAY OF PROTEIN URINE: CPT

## 2017-12-01 PROCEDURE — 82570 ASSAY OF URINE CREATININE: CPT

## 2017-12-01 PROCEDURE — 83970 ASSAY OF PARATHORMONE: CPT

## 2017-12-05 ENCOUNTER — GENERIC CONVERSION - ENCOUNTER (OUTPATIENT)
Dept: OTHER | Facility: OTHER | Age: 77
End: 2017-12-05

## 2017-12-11 ENCOUNTER — APPOINTMENT (OUTPATIENT)
Dept: LAB | Facility: CLINIC | Age: 77
End: 2017-12-11
Payer: COMMERCIAL

## 2017-12-11 DIAGNOSIS — Z79.01 LONG-TERM (CURRENT) USE OF ANTICOAGULANTS: ICD-10-CM

## 2017-12-11 LAB
INR PPP: 2.65 (ref 0.86–1.16)
PROTHROMBIN TIME: 28.6 SECONDS (ref 12.1–14.4)

## 2017-12-11 PROCEDURE — 85610 PROTHROMBIN TIME: CPT

## 2017-12-11 PROCEDURE — 36415 COLL VENOUS BLD VENIPUNCTURE: CPT

## 2018-01-10 ENCOUNTER — ALLSCRIPTS OFFICE VISIT (OUTPATIENT)
Dept: OTHER | Facility: OTHER | Age: 78
End: 2018-01-10

## 2018-01-11 NOTE — MISCELLANEOUS
Provider Comments  Provider Comments:   PATIENT WAS A NO SHOW FOR THE APPOINTMENT      Signatures   Electronically signed by : LYRIC Cavazos ; Jan 13 2017 10:43AM EST                       (Author)

## 2018-01-11 NOTE — PROCEDURES
Procedures by Eryn Chávez MD at 3/30/2017   2:02 PM      Author:  Eryn Chávez MD Service:  Electrophysiology Author Type:  Physician     Filed:  3/30/2017  4:05 PM Date of Service:  3/30/2017  2:02 PM Status:  Signed     :  Eryn Chávez MD (Physician)            PP  LINQ    History and physical were reviewed  Patient was examined and history was reviewed  No change in patient's condition Since history and physical has been completed        The pre- operative diagnosis:  Cryptogenic stroke      Postoperative diagnosis:  Cryptogenic stroke      Procedure:  LINQ        Surgeon: Eryn Chávez    Assistants -none    Specimens - none    Estimated blood loss- 2 ml    Findings-none    Complications none    Anesthesia-  local lidocaine by myself      Details of the device    Sensing =0 8 mv          Description of procedure: The patient was seen before the procedure  The details of the procedure was explained and patient agreed to the same  Appropriate consent was signed  The patient was brought to the electrophysiologic laboratory  Proper time out was done  Sterile dressing and draping was done  Local lidocaine was infiltrated, in the left fourth intercostal space, about 2 cm lateral to the sternal edge  Using the stab knife an incision was made  Thereafter the  was used, moved around to form a pocket, along the long axis of the heart, in the fourth intercostal space region  Then plunger was used to deploy the device  The plunger was disengaged  and removed  The  was pulled back  The patient is on lovenox and aspirin  A figure of 8 suture was applied for hemostasis  Pressure was held and complete hemostasis was obtained  Dressing was done with Steri-Strips, Telfa and Tegaderm      Summary of the procedure: The patient came in to the laboratory for linq device placement   The device has been placed and patient tolerated the procedure well                         Received for:Kennedy Geoffrey FREED    Mar 30 2017  4:05PM [facility] Standard Time

## 2018-01-11 NOTE — RESULT NOTES
Verified Results  CT RENAL STONE STUDY ABDOMEN PELVIS WO CONTRAST 01Hag5004 12:19PM Maryann Salazar     Test Name Result Flag Reference   CT RENAL STONE STUDY ABDOMEN PELVIS WO CONTRAST (Report)     CT ABDOMEN AND PELVIS WITHOUT IV CONTRAST - LOW DOSE RENAL STONE      INDICATION: Left lateral abdominal pain      COMPARISON: 9/27/2013     TECHNIQUE: Low dose thin section CT examination of the abdomen and pelvis was performed without intravenous or oral contrast according to a protocol specifically designed to evaluate for urinary tract calculus  Reformatted images were created in axial,   sagittal, and coronal planes  Evaluation for pathology in the abdomen and pelvis that is unrelated to urinary tract calculi is limited  Radiation dose length product (DLP) for this visit: 303 85 mGy-cm   This examination, like all CT scans performed in the [facility], was performed utilizing techniques to minimize radiation dose exposure, including the use of iterative   reconstruction and automated exposure control  FINDINGS:     RIGHT KIDNEY AND URETER:   There are multiple stones throughout the right collecting system with largest measuring 7 x 9 mm in the lower pole  This is similar to the prior study  There is a right renal cyst as seen on ultrasound, not well visualized without contrast  There is right upper pole cortical scarring which has progressed from the prior study  No hydronephrosis or hydroureter  No perinephric collection  LEFT KIDNEY AND URETER:   Multiple calculi are seen, the largest measuring 9 x 11 mm in the lower pole  This is similar to the prior study   No hydronephrosis or hydroureter  No perinephric collection  URINARY BLADDER:   Unremarkable  Left lower lobe nodule has been stable since 2013  Limited low radiation dose noncontrast CT evaluation demonstrates no clinically significant abnormality of liver, spleen, pancreas, or adrenal glands     There are gallstone(s) within the gallbladder, without pericholecystic inflammatory changes  No bowel obstruction  No ascites or lymphadenopathy  Limited evaluation demonstrates no evidence to suggest acute appendicitis  No acute fracture or destructive osseous lesion is identified  IMPRESSION:     1  No obstructive uropathy  2  Numerous bilateral nonobstructing renal calculi, similar to the prior CT  3  Cholelithiasis          Workstation performed: [id number]     Signed by:   Nathaly Gay MD   9/1/17       Plan  Left low back pain    · 1 - Cordell GONZALEZ, Amy Corona (Pain Management) Co-Management  *  Status: Active   Requested for: 47AER9607  Care Summary provided  : Yes

## 2018-01-12 VITALS
TEMPERATURE: 97.5 F | HEIGHT: 66 IN | SYSTOLIC BLOOD PRESSURE: 138 MMHG | HEART RATE: 56 BPM | DIASTOLIC BLOOD PRESSURE: 64 MMHG | RESPIRATION RATE: 20 BRPM | BODY MASS INDEX: 30.98 KG/M2 | WEIGHT: 192.8 LBS

## 2018-01-12 NOTE — PROGRESS NOTES
History of Present Illness  Care Coordination Encounter Information:   Type of Encounter: Telephonic    Spoke to  5/2/17 left message to call back  left message on 4/28/17 also  Active Problems    1  Actinic keratosis (702 0) (L57 0)   2  Allergic rhinitis, unspecified allergic rhinitis type   3  Asymptomatic carotid artery stenosis (433 10) (I65 29)   4  Benign essential hypertension (401 1) (I10)   5  Cerebrovascular accident (CVA) (434 91) (I63 9)   6  Chronic kidney disease, stage 3 (585 3) (N18 3)   7  Controlled diabetes mellitus with kidney complication (849 11) (W42 14)   8  Coronary artery disease (414 00) (I25 10)   9  Cough (786 2) (R05)   10  CVA (cerebral vascular accident) (434 91) (I63 9)   11  Diabetes Mellitus (250 00)   12  Diabetic Peripheral Neuropathy (357 2)   13  Erectile dysfunction of non-organic origin (302 72) (F52 21)   14  Hospital discharge follow-up (V67 59) (Z09)   15  Hyperlipidemia (272 4) (E78 5)   16  Influenza A (487 1) (J10 1)   17  Medicare annual wellness visit, initial (V70 0) (Z00 00)   18  Medicare annual wellness visit, subsequent (V70 0) (Z00 00)   19  Myelomalacia (336 8) (G95 89)   20  Need for pneumococcal vaccination (V03 82) (Z23)   21  Need for vaccination with 13-polyvalent pneumococcal conjugate vaccine (V03 82) (Z23)   22  Nephrolithiasis (592 0) (N20 0)   23  Obstructive sleep apnea (327 23) (G47 33)   24  Osteoarthritis (715 90) (M19 90)   25  Peripheral vascular disease (443 9) (I73 9)   26  Post-nasal drip (784 91) (R09 82)   27  Pulmonary nodule seen on imaging study (793 11) (R91 1)   28  Screening for colorectal cancer (V76 51) (Z12 11,Z12 12)   29  Type 2 diabetes mellitus (250 00) (E11 9)    Past Medical History    1  History of Acute renal failure (584 9) (N17 9)   2  History of acute renal failure (V13 09) (Z87 448)   3  History of hematuria (V13 09) (Z87 448)   4  History of syncope (V15 89) (Z87 898)   5   History of upper respiratory infection (V12 09) (Z87 09)   6  History of Hyperkalemia (276 7) (E87 5)   7  History of Microalbuminuria (791 0) (R80 9)   8  History of Obstructive Uropathy (599 60)   9  History of Respiratory condition due to external agent (508 9) (J70 9)   10  History of Synovial cyst of popliteal space (727 51) (M71 20)   11  History of Vitamin D deficiency (268 9) (E55 9)    Surgical History    1  History of CABG   2  History of Colonoscopy (Fiberoptic)   3  History of Diagnostic Cystoscopy   4  History of Elective Circumcision   5  History of Nephrostomy With Drainage   6  History of Open Treatment Of Humeral Greater Tuberosity Fracture   7  History of Renal Lithotripsy   8  History of Surgery Vas Deferens Vasectomy    Family History  Mother    1  Family history of Acute Myocardial Infarction (V17 3)   2  Family history of Coronary Artery Disease (V17 49)  Maternal Grandfather    3  Family history of Diabetes Mellitus (V18 0)    Social History    · Denied: History of Alcohol   · Daily caffeine consumption, 1 serving a day   · Denied: Drug use (305 90) (F19 90)   · Former smoker (V15 82) (B92 445)    Current Meds    1  Lisinopril 20 MG Oral Tablet; TAKE 1 TABLET DAILY; Therapy: 63VPJ3846 to (03 17 74 30 53)  Requested for: 20Apr2017; Last   Rx:20Apr2017 Ordered    2  Aspirin 325 MG Oral Tablet; TAKE 1 TABLET DAILY; Therapy: 77IXN0721 to (Evaluate:17Oct2017); Last Rx:20Apr2017 Ordered    3  Metoprolol Tartrate 50 MG Oral Tablet; take one tablet by mouth twice daily; Therapy: 27LTX7578 to (Evaluate:80Iki9425)  Requested for: 12Jan2017; Last   Rx:14Nov2016 Ordered    4  Glucophage 500 MG Oral Tablet (MetFORMIN HCl); Take 1 tablet every 12 hours; Therapy: 52ZXB8080 to (Last Rx:20Apr2017)  Requested for: 20Apr2017 Ordered    5  Vitamin B-12 1000 MCG Oral Tablet; TAKE 1 TABLET DAILY AS DIRECTED; Therapy: 73XJX2256 to (Evaluate:83Xsl8135); Last Rx:95Nsn8000 Ordered    6  Atorvastatin Calcium 40 MG Oral Tablet;  Take 1 tablet daily; Last Rx:87Iin3349 Ordered    7  NovoLIN N 100 UNIT/ML Subcutaneous Suspension; Inject 55 units in the morning   before breakfast and 30 units in the evening before dinner or as directed; Therapy: 25PLA3162 to (Evaluate:2015); Last V01JNE1251 Ordered    8  Claritin 10 MG Oral Tablet; TAKE 1 TABLET DAILY; Therapy: 58IES3533 to Recorded    Allergies    1  FLU    Health Management   COLONOSCOPY; every 5 years; Last 33Igk9296; Next Due: 27NUT9775; Active   *VB - Eye Exam; every 1 year; Last 73Oyc6554; Next Due: 82BCO7221; Overdue   Medicare Annual Wellness Visit; every 1 year; Next Due: 14Moy6363; Overdue    End of Encounter Meds    1  Lisinopril 20 MG Oral Tablet; TAKE 1 TABLET DAILY; Therapy: 57NUE3416 to (([phone])  Requested for: 2017; Last   Rx:28Sja0377 Ordered    2  Aspirin 325 MG Oral Tablet; TAKE 1 TABLET DAILY; Therapy: 98COD9991 to (Evaluate:2017); Last Rx:2017 Ordered    3  Metoprolol Tartrate 50 MG Oral Tablet; take one tablet by mouth twice daily; Therapy: 29CNK8078 to (Evaluate:14Iwd2029)  Requested for: 2017; Last   Rx:2016 Ordered    4  Glucophage 500 MG Oral Tablet (MetFORMIN HCl); Take 1 tablet every 12 hours; Therapy: 25JAR9986 to (Last Rx:2017)  Requested for: 2017 Ordered    5  Vitamin B-12 1000 MCG Oral Tablet; TAKE 1 TABLET DAILY AS DIRECTED; Therapy: 36DDV8110 to (Evaluate:58Iuu7924); Last Rx:16Pam9557 Ordered    6  Atorvastatin Calcium 40 MG Oral Tablet; Take 1 tablet daily; Last Rx:74Gqe3275 Ordered    7  NovoLIN N 100 UNIT/ML Subcutaneous Suspension; Inject 55 units in the morning   before breakfast and 30 units in the evening before dinner or as directed; Therapy: 07GLD0462 to (Evaluate:2015); Last SN:14WXF8007 Ordered    8  Claritin 10 MG Oral Tablet; TAKE 1 TABLET DAILY;    Therapy: 23ATA6688 to Recorded    Future Appointments    Date/Time Provider Specialty Site   2017 01:30 PM Kentrell Vera, 10 Melanie Chavez Nephrology [facility] [place]   05/30/2017 09:00 AM MD Thaddeus Lara     Patient Care Team    Care Team Member Role Specialty Office Number   Kelvin GUNTER ABDIAZIZ    Nephrology [phone]   [address] [place] [address] [phone]   [facility]   Electronically signed by : Tim Paniagua RN; May  2 2017 11:04AM EST                       (Author)

## 2018-01-12 NOTE — PROGRESS NOTES
Assessment   1  Chronic kidney disease, stage 3 (585 3) (N18 3)   2  Benign essential hypertension (401 1) (I10)   3  Controlled diabetes mellitus with kidney complication (831 83) (H74 89)   4  Cerebrovascular accident (CVA) (434 91) (I63 9)   5  Nephrolithiasis (592 0) (N20 0)    Plan   Chronic kidney disease, stage 3    · (1) CBC/PLT/DIFF; Status:Active; Requested for:01Sep2018; Perform:[facility]; XKN:87SBF3931;BUBIGJN; For:Chronic kidney disease, stage 3; Ordered By:Hank Moon;   · (1) PTH N-TERMINAL (INTACT); Status:Active; Requested for:01Sep2018; Perform:[facility]; YQV:11VOH9179;AYPQWXO; For:Chronic kidney disease, stage 3; Ordered By:Hank Moon;   · (1) RENAL FUNCTION PANEL; Status:Active; Requested for:01Sep2018; Perform:[facility]; TCU:87IRT4997;EPJKQJW; For:Chronic kidney disease, stage 3; Ordered By:Hank Moon;   · (1) URINE PROTEIN CREATININE RATIO; Status:Active; Requested for:01Sep2018; Perform:[facility]; IHZ:24WXJ7272;HHAQBUY; For:Chronic kidney disease, stage 3; Ordered By:Hank Moon;   · Diabetes & Kidney Disease handout given today; Status:Complete;   Done: 98XJE8846   Ordered; For:Chronic kidney disease, stage 3; Ordered By:Hank Moon;   · Do not take aspirin or anti-inflammatory medicines ; Status:Complete;   Done:    42SQK1657   Ordered; For:Chronic kidney disease, stage 3; Ordered By:Hank Moon;   · High Blood Pressure handout given today; Status:Complete;   Done: 99VYW9770   Ordered; For:Chronic kidney disease, stage 3; Ordered By:Hank Moon;   · Restrict your sodium (salt) intake to 2 grams per day ; Status:Complete;   Done:    84WXP6139   Ordered; For:Chronic kidney disease, stage 3; Ordered By:Hank Moon;   · Understanding CKD handout given today; Status:Complete;   Done: 32ZSI8539   Ordered; For:Chronic kidney disease, stage 3; Ordered By:Hank Moon;   · Follow-up Visit in 8 Months Evaluation and Treatment  Follow-up  Status: Hold For -    Scheduling  Requested for: 34BPD0299   Ordered; For: Chronic kidney disease, stage 3; Ordered By: Nathan Garfield Performed:  Due: 11LQX4935    Discussion/Summary      51-year-old gentleman with CKD stage 3, diabetes, hypertension, coronary artery disease, history of nephrolithiasis and episode of obstructive uropathy requiring bilateral percutaneous nephrostomy tube in 2013, episodes CVA last year, returns to the office for follow-up  Chronic kidney disease stage 3 with a kidney function fairly stable, his creatinine is been around 1 2-1 54 years and the most recent creatinine is 1 4 on December 2017  kidney disease suspected secondary to diabetes, hypertension, thus carotid disease  Will follow him back in 8 months with repeat labs  Hypertension, blood pressure well controlled, continue with same blood pressure medication  Diabetes, followed by his family doctor  Mineral bone disease, PTH at range  Hemoglobin normal on December 2017  History of nephrolithiasis, has not passed any kidney stones recently, advised to follow a low-salt diet on drink at least 2 L of water daily  Acute ready syndrome status post pacemaker November 2017    follow him back in our office in 8 months with repeat labs  NSAIDs (no ibuprofen, Motrin, Aleve, naproxen, Advil)  Okay to take Tylenol as needed for pain  The patient was counseled regarding diagnostic results,-- instructions for management,-- risk factor reductions,-- risks and benefits of treatment options,-- importance of compliance with treatment  Patient is able to Self-Care  Possible side effects of new medications were reviewed with the patient/guardian today  The treatment plan was reviewed with the patient/guardian  The patient/guardian understands and agrees with the treatment plan    He has no barriers to learning  Indication for Services: CKD Stage 3   CKD Teaching includes hypertension management, Avoid nephrotoxic medication, sodium restriction and fluid management  Current Patient Status: no bone mineral disease,-- no anemia-- and-- no worsening of renal function  Discussed with the patient      Reason For Visit   Chronic kidney disease follow-up      History of Present Illness   Navin Suggs is a 40-year-old gentleman with chronic kidney disease stage 3 with a baseline creatinine around 1 2-1 5, diabetes for over 20 years, hypertension, coronary artery disease status post CABG 19 years ago, history of nephrolithiasis with episode of acute kidney injury secondary to obstructive uropathy requiring bilateral percutaneous nephrostomy tubes on April 2013, hospitalized in March last year for a stroke with right-sided weakness since then he is having balance issues and currently walking with a cane, last time seen in our office was in June 2017  that last visit, 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 on November 7, 2017  returns to the office for follow-up, in generally feeling okay, denies any chest pain or shortness of breath, denies any lower extremity edema, denies any abdominal pain, diarrhea or constipation, denies urinary problems, no blood in the urine, no nocturia, has not passed any kidney stones in years  is okay, weight is stable  tobacco or alcohol use  Review of Systems        Constitutional: no fever,-- no chills,-- no anorexia,-- no fatigue,-- no recent weight gain-- and-- no recent weight loss  Integumentary: no rashes  Gastrointestinal: no abdominal pain,-- no constipation,-- no nausea,-- no diarrhea-- and-- no vomiting  Respiratory: no shortness of breath,-- no cough-- and-- not coughing up sputum  Cardiovascular: no orthopnea,-- no PND,-- no chest pain,-- no palpitations-- and-- no lower extremity edema  Musculoskeletal: no joint pain-- and-- no joint swelling        Neurological: no headache,-- no lightheadedness-- and-- no dizziness  Genitourinary: no dysuria,-- no hematuria-- and-- no nocturia  Eyes: no eyesight problems-- and-- no dryness of the eyes  ENT: hearing loss, but-- no nasal discharge  Psychiatric: not suicidal       Past Medical History      The active problems and past medical history were reviewed and updated today  Surgical History      The surgical history was reviewed and updated today  Family History      The family history was reviewed and updated today  Social History   The social history was reviewed and updated today  Current Meds    1  Aspirin 325 MG Oral Tablet; TAKE 1 TABLET DAILY; Therapy: 80EKY1251 to (Evaluate:17Oct2017); Last Rx:89Hld2764 Ordered   2  Atorvastatin Calcium 40 MG Oral Tablet; Take 1 tablet daily; Last Rx:20Apr2017 Ordered   3  Claritin 10 MG Oral Tablet; TAKE 1/2 TABLET DAILY; Therapy: (Recorded:21Ntr6431) to Recorded   4  Lisinopril 40 MG Oral Tablet; take 0 5 tablet daily; Therapy: 09RIP0745 to (Hurman Feeler)  Requested for: 32RVU4764; Last     Rx:20Oct2017 Ordered   5  MetFORMIN HCl - 500 MG Oral Tablet; take 1 tablet every twelve hours; Therapy: 68VWM1342 to ([facility]UST)  Requested for: 43Zol7340; Last     Rx:88Fge8968 Ordered   6  NovoLIN N 100 UNIT/ML Subcutaneous Suspension; Inject 55 units in the morning     before breakfast and 30 units in the evening before dinner or as directed; Therapy: 80XCM3321 to (Evaluate:01Nov2015); Last AS:61YWZ0844 Ordered   7  Vitamin B-12 1000 MCG Oral Tablet; TAKE 1 TABLET DAILY AS DIRECTED; Therapy: 12NQE0000 to (Evaluate:26Omd7915); Last Rx:64Hct5137 Ordered   8  Warfarin Sodium 5 MG Oral Tablet; TAKE as instructed; Therapy: 90Ses8636 to (Evaluate:34Fvx3430); Last Rx:44Kyo1485 Ordered     The medication list was reviewed and updated today  Allergies   1   FLU    Vitals   Vital Signs    Recorded: 28NLE0380 02:18PM Recorded: 19UEV5860 02:08PM   Heart Rate 78    Pulse Quality Normal    Systolic 067, LUE, Sitting    Diastolic 60, LUE, Sitting    Height  5 ft 5 5 in   Weight  171 lb 4 0 oz   BMI Calculated  28 06   BSA Calculated  1 86     Physical Exam        Constitutional: General appearance: No acute distress, well appearing and well nourished  ENT: External ears and nose appear normal          Eyes: Anicteric sclerae  JVD:  No JVD present  Pulmonary:  Auscultation of lungs: Clear to auscultation  Cardiovascular: Auscultation of heart: Normal rate and rhythm, normal S1 and S2, without murmurs  Abdomen: Non-tender, no masses  Pulses: Dorsalis Pedis and Posterior Tibial pulses normal       Neurologic: Non Focal          Psychiatric: Orientation to person, place, and time: Normal  -- and-- Mood and affect: Normal        Back: No CVA tenderness  Results/Data   Diagnostic Studies Reviewed: I personally reviewed the films/images/results in the office today  My interpretation follows  (1) RENAL FUNCTION PANEL 15Kcl3303 08:16AM Garima Fix    Order Number: [id number]      Test Name Result Flag Reference   ANION GAP (CALC) 6 mmol/L  4-13   ALBUMIN 3 5 g/dL  3 5-5 0   BLOOD UREA NITROGEN 21 mg/dL  5-25   CALCIUM 9 3 mg/dL  8 3-10 1   CHLORIDE 109 mmol/L H 100-108   CARBON DIOXIDE 27 mmol/L  21-32   CREATININE 1 44 mg/dL H 0 60-1 30   Standardized to IDMS reference method   POTASSIUM 4 3 mmol/L  3 5-5 3   SODIUM 142 mmol/L  136-145   PHOSPHORUS 2 7 mg/dL  2 3-4 1   eGFR 47 ml/min/1 73sq m     GLUCOSE FASTING 180 mg/dL H 65-99   Specimen collection should occur prior to Sulfasalazine administration due to the potential for falsely depressed results  Specimen collection should occur prior to Sulfapyridine administration due to the potential for falsely elevated results  Specimen collection should occur prior to Sulfasalazine administration due to the potential for falsely depressed results   Specimen collection should occur prior to Sulfapyridine administration due to the potential for falsely elevated results  National Kidney Disease Education Program recommendations are as follows:     GFR calculation is accurate only with a steady state creatinine     Chronic Kidney disease less than 60 ml/min/1 73 sq  meters     Kidney failure less than 15 ml/min/1 73 sq  meters  (1) PTH N-TERMINAL (INTACT) 72UQC9784 08:16AM [employer] Order Number: [id number]      Test Name Result Flag Reference   PARATHYROID HORMONE INTACT 22 6 pg/mL  14 0-72 0      (1) CBC/PLT/DIFF 26MBR9130 08:16AM [employer] Order Number: [id number]      Test Name Result Flag Reference   WBC COUNT 8 84 Thousand/uL  4 31-10 16   RBC COUNT 5 07 Million/uL  3 88-5 62   HEMOGLOBIN 14 2 g/dL  12 0-17 0   HEMATOCRIT 42 2 %  36 5-49 3   MCV 83 fL  82-98   MCH 28 0 pg  26 8-34 3   MCHC 33 6 g/dL  31 4-37 4   RDW 14 5 %  11 6-15 1   MPV 10 8 fL  8 9-12 7   PLATELET COUNT 616 Thousands/uL  149-390   nRBC AUTOMATED 0 /100 WBCs     NEUTROPHILS RELATIVE PERCENT 65 %  43-75   LYMPHOCYTES RELATIVE PERCENT 23 %  14-44   MONOCYTES RELATIVE PERCENT 10 %  4-12   EOSINOPHILS RELATIVE PERCENT 2 %  0-6   BASOPHILS RELATIVE PERCENT 0 %  0-1   NEUTROPHILS ABSOLUTE COUNT 5 79 Thousands/? ??L  1 85-7 62   LYMPHOCYTES ABSOLUTE COUNT 2 03 Thousands/? ??L  0 60-4 47   MONOCYTES ABSOLUTE COUNT 0 84 Thousand/? ??L  0 17-1 22   EOSINOPHILS ABSOLUTE COUNT 0 13 Thousand/? ??L  0 00-0 61   BASOPHILS ABSOLUTE COUNT 0 03 Thousands/? ??L  0 00-0 10      (1) URINE PROTEIN CREATININE RATIO 79Uta3656 08:16AM [employer] Order Number: [id number]      Test Name Result Flag Reference   CREATININE URINE 97 5 mg/dL     URINE PROTEIN:CREATININE RATIO 0 49 H 0 00-0 10   URINE PROTEIN 48 mg/dL        Health Management   Screening for colorectal cancer   COLONOSCOPY; every 5 years; Last 08Sep2015; Next Due: 68DET4187;  Active  Type 2 diabetes mellitus   *VB - Eye Exam; every 1 year; Last 77RQT1694; Next Due: 30Hoq3601; Near Due  Health Maintenance   Medicare Annual Wellness Visit; every 1 year; Next Due: 62Pvk4081;  Overdue    Signatures    Electronically signed by : LYRIC Reyes ; Radames 10 2018  2:28PM EST                       (Author)

## 2018-01-12 NOTE — MISCELLANEOUS
Message   Recorded as Task   Date: 10/20/2017 08:57 AM, Created By: Pedrito Manjarrez   Task Name: Follow Up   Assigned To: Hollie Sands   Regarding Patient: Ophelia Patton, Status: Active   CommentErick Shjenny - 20 Oct 2017 8:57 AM     TASK CREATED  Caller: Self; General Medical Question; [phone] (Home)  Patient is taking BP meds and finds BP low  Readings are 120/49 at the moment yesterday 154/34 patient wants to know if thats low and should he continue to take meds  Rick Delacruz - 20 Oct 2017 5:12 PM     TASK REPLIED TO: Previously Assigned To Rick Delacruz  I called pt back  Pt states his BP was low recently  Denies lightheaded, dizzy, SOB or chest pain  Pt states his [place] doctor already took him off metoprolol recently, but his BP still low  I advised pt to take lisinopril 40mg 1/2 tab daily now  Check BP before taking lisinopril  If BP <100/60, hold it  Go to ER if any symptoms such as dizzy, lightheaded, SOB or CP etc    Check BP 2/day and call office next week about BP reading  Pt understood  Plan  Benign essential hypertension, Type 2 diabetes mellitus    · From  Lisinopril 40 MG Oral Tablet TAKE 1 TABLET DAILY To Lisinopril 40 MG  Oral Tablet take 0 5 tablet daily  Coronary artery disease, Hypertension    · Metoprolol Tartrate 50 MG Oral Tablet    Signatures   Electronically signed by :  David Jaime MD; Oct 20 2017  5:12PM EST                       (Author)

## 2018-01-13 VITALS
HEIGHT: 66 IN | DIASTOLIC BLOOD PRESSURE: 60 MMHG | HEART RATE: 62 BPM | SYSTOLIC BLOOD PRESSURE: 162 MMHG | RESPIRATION RATE: 16 BRPM | BODY MASS INDEX: 29.47 KG/M2 | WEIGHT: 183.38 LBS

## 2018-01-13 NOTE — MISCELLANEOUS
Message   Recorded as Task   Date: 09/12/2017 03:08 PM, Created By: Yamilet Nelson   Task Name: Miscellaneous   Assigned To: Rhonda Malone   Regarding Patient: Beronica Hagan, Status: In Progress   Filippobelgica Shelton - 12 Sep 2017 3:08 PM     TASK CREATED  Repeat BMP with stable renal function  Please call patient f/u in 6 months for last apt with repeat labs as ordered last office visit  Mu Garcia - 13 Sep 2017 9:49 AM     TASK IN PROGRESS   Alona Henry - 13 Sep 2017 9:50 AM     TASK EDITED    l/m for patient to return call     I spoke to the patient and he is aware of above  AG      Active Problems    1  Actinic keratosis (702 0) (L57 0)   2  Allergic rhinitis, unspecified allergic rhinitis type   3  Asymptomatic carotid artery stenosis (433 10) (I65 29)   4  Benign essential hypertension (401 1) (I10)   5  Cerebrovascular accident (CVA) (434 91) (I63 9)   6  Chronic kidney disease, stage 3 (585 3) (N18 3)   7  Controlled diabetes mellitus with kidney complication (334 46) (F11 14)   8  Coronary artery disease (414 00) (I25 10)   9  Diabetes Mellitus (250 00)   10  Diabetic Peripheral Neuropathy (357 2)   11  Erectile dysfunction of non-organic origin (302 72) (F52 21)   12  Gait instability (781 2) (R26 81)   13  Hospital discharge follow-up (V67 59) (Z09)   14  Hyperlipidemia (272 4) (E78 5)   15  Left lateral abdominal pain (789 09) (R10 9)   16  Left low back pain (724 2) (M54 5)   17  Medicare annual wellness visit, initial (V70 0) (Z00 00)   18  Medicare annual wellness visit, subsequent (V70 0) (Z00 00)   19  Myelomalacia (336 8) (G95 89)   20  Need for pneumococcal vaccination (V03 82) (Z23)   21  Need for vaccination with 13-polyvalent pneumococcal conjugate vaccine (V03 82) (Z23)   22  Nephrolithiasis (592 0) (N20 0)   23  Obstructive sleep apnea (327 23) (G47 33)   24  Osteoarthritis (715 90) (M19 90)   25  Peripheral vascular disease (443 9) (I73 9)   26  Post-nasal drip (784 91) (R09 82)   27  Pulmonary nodule seen on imaging study (793 11) (R91 1)   28  Screening for colorectal cancer (V76 51) (Z12 11,Z12 12)   29  Type 2 diabetes mellitus (250 00) (E11 9)    Current Meds   1  Aspirin 325 MG Oral Tablet; TAKE 1 TABLET DAILY; Therapy: 66FNC5971 to (Evaluate:2017); Last Rx:59Ymk0852 Ordered   2  Atorvastatin Calcium 40 MG Oral Tablet; Take 1 tablet daily; Last Rx:17Xsk6475 Ordered   3  Claritin 10 MG Oral Tablet; TAKE 1/2 TABLET DAILY; Therapy: (Recorded:86Gpi9507) to Recorded   4  Lisinopril 40 MG Oral Tablet; TAKE 1 TABLET DAILY; Therapy: 18AGF6359 to (Evaluate:62Nik0426)  Requested for: 93Nmu1081; Last   Rx:25Jcn5389 Ordered   5  MetFORMIN HCl - 500 MG Oral Tablet; take 1 tablet every twelve hours; Therapy: 44HVZ2215 to (0493 28 62 88)  Requested for: 31Jph5558; Last   Rx:59Tyh7084 Ordered   6  Metoprolol Tartrate 50 MG Oral Tablet; take one tablet by mouth twice daily; Therapy: 18XOD2743 to (Evaluate:04Myp2933)  Requested for: 76Tjf7165; Last   Rx:2016 Ordered   7  NovoLIN N 100 UNIT/ML Subcutaneous Suspension; Inject 55 units in the morning   before breakfast and 30 units in the evening before dinner or as directed; Therapy: 33FTW7578 to (Evaluate:2015); Last P48DOR8794 Ordered   8  Vitamin B-12 1000 MCG Oral Tablet; TAKE 1 TABLET DAILY AS DIRECTED; Therapy: 18WWJ6399 to (Evaluate:09Mgt4196); Last Rx:77Vgi8186 Ordered   9  Warfarin Sodium 5 MG Oral Tablet (Coumadin); TAKE as instructed; Therapy: 79Aue8472 to (Evaluate:19Cea4723); Last Rx:64Lqf5666 Ordered    Allergies    1   FLU    Signatures   Electronically signed by : LYRIC Torres ; Sep 13 2017 10:12AM EST

## 2018-01-13 NOTE — RESULT NOTES
Message  Received lab results done on 5/25/2017  UA ok  CMP ok Cr 1 29, GFR 54 FU nephrology  lipid 135/137/30/73 ok  hgA1C 7 1 ok  CBC ok      Signatures   Electronically signed by :  Annamarie Summers MD; Jun 9 2017  3:31PM EST                       (Author)

## 2018-01-13 NOTE — MISCELLANEOUS
Assessment    1  Hospital discharge follow-up (V67 59) (Z09)   2  CVA (cerebral vascular accident) (434 91) (I63 9)   3  Myelomalacia (336 8) (G95 89)   4  Benign essential hypertension (401 1) (I10)   5  Asymptomatic carotid artery stenosis (433 10) (I65 29)   6  Chronic kidney disease, stage 3 (585 3) (N18 3)   7  Controlled diabetes mellitus with kidney complication (482 46) (L56 56)   8  Hyperlipidemia (272 4) (E78 5)   9  Type 2 diabetes mellitus (250 00) (E11 9)    Plan  Benign essential hypertension, Type 2 diabetes mellitus    · From  Lisinopril 2 5 MG Oral Tablet TAKE ONE TABLET BY MOUTH ONCE  DAILY To Lisinopril 20 MG Oral Tablet TAKE 1 TABLET DAILY   Rx By: Shanae Litlte; Dispense: 30 Days ; #:30 Tablet; Refill: 5; For: Benign essential hypertension, Type 2 diabetes mellitus; MARCIO = N; Record  Coronary artery disease    · From  Aspirin 81 MG TABS TAKE 1 TABLET DAILY To Aspirin 325 MG Oral  Tablet TAKE 1 TABLET DAILY   Rx By: Shanae Little; Dispense: 30 Days ; #:30 Tablet; Refill: 5; For: Coronary artery disease; MARCIO = N; Record  Diabetic Peripheral Neuropathy    · From  MetFORMIN HCl - 1000 MG Oral Tablet take one tablet by mouth twice  daily To Glucophage 500 MG Oral Tablet (MetFORMIN HCl) Take 1 tablet every 12 hours   Rx By: Shanae Little; Dispense: 0 Days ; #:1 X 60 Tablet Bottle; Refill: 0; For: Diabetic Peripheral Neuropathy; MARCIO = N; Record  Hyperlipidemia    · From  Atorvastatin Calcium 20 MG Oral Tablet take 1/2 tablet daily To  Atorvastatin Calcium 40 MG Oral Tablet Take 1 tablet daily   Rx By: Shanae Little; Dispense: 30 Days ; #:30 Tablet; Refill: 5; For: Hyperlipidemia; MARCIO = N; Record    Discussion/Summary  Discussion Summary:   Hospital records reviewed today  Medication list updated  Follow up with neurology as scheduled on 5/8/17 and neurosurgery (for Myelomalacia) as scheduled on 4/27/17      He will check his BP BID and bring log to cardiology appointment next week  Follow a low sodium diet  Continue to monitor blood sugars daily and keep log  He is still declining outpatient PT today  Feels he is getting stronger each day  He does have a referral for PT from his hospitalization if needed  Fall precautions at home  RTO as scheduled   Medication SE Review and Pt Understands Tx: Possible side effects of new medications were reviewed with the patient/guardian today  The treatment plan was reviewed with the patient/guardian  The patient/guardian understands and agrees with the treatment plan      Chief Complaint  Chief Complaint Free Text Note Form: HTN vs arrhythmia  History of Present Illness  TCM Communication [facility]: The patient is being contacted for follow-up after hospitalization  He was hospitalized at [facility]  The dates of hospitalization: 7, date of admission: 03/31/2017, date of discharge: 04/07/2017  Diagnosis: HTN vs arrhythmia  He was discharged to home  Medications were not reviewed today  He scheduled a follow up appointment  Counseling was provided to the patient  Communication performed and completed by Troy Durant MA   HPI: Pt presents by himself today for a STACY visit  He was admitted to [facility] 3/25/17 to 3/31/17 and was transferred to [facility] 3/31/17 to 4/7/17  Presented to the ED with right sided weakness  MRI of the brain confirmed an ischemic infarct Left Youssef Radiata  ASA was increased to 325mg daily, Lipitor was increased to 40mg daily, Lisinopril to 20mg daily  Lopressor unchanged at 50mg BID  MRI also showed significant cervical stenosis indicative of myelomalacia  He will be following up with neurosurgery on 4/27/17  A Loop Recorder was placed to r/o Afib as a potential cause  He also follows with cardiology at [facility], Dr Marty Li  Upcoming appointment on Monday 4/24/17  Carotid artery duplex showed Left and Right carotid arteries with 50-69% stenosis       Today, he reports he is feeling much better  Denies any residual deficits from CVA  He is using a walker for long-distance ambulation but feels he is improving  He declined outpatient PT but does have a referral for this  He BP has been ranging 130-150/80-90s  Denies CP, palpitations, edema, SOB, dizziness, tinnitus, faciali flushing, change in vision  Blood sugars range , no episodes of hypoglycemia  Taking Metformin 500mg BID and NovoLin N      Review of Systems  Complete-Male:   Constitutional: no fever, not feeling poorly, no chills and not feeling tired  Cardiovascular: no chest pain, no intermittent leg claudication, no palpitations and no extremity edema  Respiratory: no shortness of breath, no cough, no wheezing and no shortness of breath during exertion  Gastrointestinal: no abdominal pain, no nausea, no constipation, no diarrhea and no blood in stools  Genitourinary: no dysuria  Integumentary: no rashes  Neurological: no headache and no dizziness  Psychiatric: no anxiety and no depression  Endocrine: no feelings of weakness  Hematologic/Lymphatic: no swollen glands  ROS Reviewed:   ROS reviewed  Active Problems    1  Actinic keratosis (702 0) (L57 0)   2  Allergic rhinitis, unspecified allergic rhinitis type   3  Asymptomatic carotid artery stenosis (433 10) (I65 29)   4  Benign essential hypertension (401 1) (I10)   5  Cerebrovascular accident (CVA) (434 91) (I63 9)   6  Chronic kidney disease, stage 3 (585 3) (N18 3)   7  Controlled diabetes mellitus with kidney complication (250 40) (L88 23)   8  Coronary artery disease (414 00) (I25 10)   9  Cough (786 2) (R05)   10  Diabetes Mellitus (250 00)   11  Diabetic Peripheral Neuropathy (357 2)   12  Erectile dysfunction of non-organic origin (302 72) (F52 21)   13  Hyperlipidemia (272 4) (E78 5)   14  Influenza A (487 1) (J10 1)   15  Medicare annual wellness visit, initial (V70 0) (Z00 00)   16   Medicare annual wellness visit, subsequent (V70 0) (Z00 00)   17  Need for pneumococcal vaccination (V03 82) (Z23)   18  Need for vaccination with 13-polyvalent pneumococcal conjugate vaccine (V03 82) (Z23)   19  Nephrolithiasis (592 0) (N20 0)   20  Obstructive sleep apnea (327 23) (G47 33)   21  Osteoarthritis (715 90) (M19 90)   22  Peripheral vascular disease (443 9) (I73 9)   23  Post-nasal drip (784 91) (R09 82)   24  Pulmonary nodule seen on imaging study (793 11) (R91 1)   25  Screening for colorectal cancer (V76 51) (Z12 11,Z12 12)   26  Type 2 diabetes mellitus (250 00) (E11 9)    Past Medical History    1  History of Acute renal failure (584 9) (N17 9)   2  History of acute renal failure (V13 09) (Z87 448)   3  History of hematuria (V13 09) (Z87 448)   4  History of syncope (V15 89) (Z87 898)   5  History of upper respiratory infection (V12 09) (Z87 09)   6  History of Hyperkalemia (276 7) (E87 5)   7  History of Microalbuminuria (791 0) (R80 9)   8  History of Obstructive Uropathy (599 60)   9  History of Respiratory condition due to external agent (508 9) (J70 9)   10  History of Synovial cyst of popliteal space (727 51) (M71 20)   11  History of Vitamin D deficiency (268 9) (E55 9)    Surgical History    1  History of CABG   2  History of Colonoscopy (Fiberoptic)   3  History of Diagnostic Cystoscopy   4  History of Elective Circumcision   5  History of Nephrostomy With Drainage   6  History of Open Treatment Of Humeral Greater Tuberosity Fracture   7  History of Renal Lithotripsy   8  History of Surgery Vas Deferens Vasectomy    Family History  Mother    1  Family history of Acute Myocardial Infarction (V17 3)   2  Family history of Coronary Artery Disease (V17 49)  Maternal Grandfather    3  Family history of Diabetes Mellitus (V18 0)    Social History    · Denied: History of Alcohol   · Daily caffeine consumption, 1 serving a day   · Denied: Drug use (305 90) (F19 90)   · Former smoker (S52 37) (G13 566)  Social History Reviewed:  The social history was reviewed and updated today  The social history was reviewed and is unchanged  Current Meds   1  Aspirin 81 MG TABS; TAKE 1 TABLET DAILY; Therapy: 90CGE4564 to (Evaluate:67Luz9004); Last Rx:05Nov2013 Ordered   2  Atorvastatin Calcium 20 MG Oral Tablet; take 1/2 tablet daily Recorded   3  Claritin 10 MG Oral Tablet; TAKE 1 TABLET DAILY; Therapy: 88XUE4147 to Recorded   4  Lisinopril 2 5 MG Oral Tablet; TAKE ONE TABLET BY MOUTH ONCE DAILY; Therapy: 17NOD7825 to (Evaluate:15May2017)  Requested for: 72IFM3623; Last   Rx:17Oct2016 Ordered   5  MetFORMIN HCl - 1000 MG Oral Tablet; take one tablet by mouth twice daily; Therapy: 11RWJ9325 to (Evaluate:15Apr2017)  Requested for: 57ZLY6709; Last   Rx:17Oct2016; Status: ACTIVE - Renewal Denied Ordered   6  Metoprolol Tartrate 50 MG Oral Tablet; take one tablet by mouth twice daily; Therapy: 59KOF6478 to (Evaluate:31Hmf6449)  Requested for: 12Jan2017; Last   Rx:14Nov2016 Ordered   7  NovoLIN N 100 UNIT/ML Subcutaneous Suspension; Inject 55 units in the morning   before breakfast and 30 units in the evening before dinner or as directed; Therapy: 46JGX5268 to (Evaluate:01Nov2015); Last QT:09XAI8113 Ordered   8  Vitamin B-12 1000 MCG Oral Tablet; TAKE 1 TABLET DAILY AS DIRECTED; Therapy: 70NYJ8581 to (Evaluate:28Yzc8405); Last Rx:71Wvz3181 Ordered  Medication List Reviewed: The medication list was reviewed and updated today  Allergies    1  FLU    Vitals  Signs   Recorded: 64WLM0425 38:79PJ   Systolic: 877, LUE, Sitting  Diastolic: 78, LUE, Sitting  Recorded: 20Apr2017 10:10AM   Temperature: 98 6 F, Tympanic  Heart Rate: 56, R Radial  Respiration: 18  Systolic: 003, RUE, Sitting  Diastolic: 60, RUE, Sitting  Height: 5 ft 5 5 in  Weight: 178 lb 6 oz  BMI Calculated: 29 23  BSA Calculated: 1 89    Physical Exam    Constitutional   General appearance: No acute distress, well appearing and well nourished      Eyes   Conjunctiva and lids: No swelling, erythema, or discharge  Pupils and irises: Equal, round and reactive to light  Pulmonary   Respiratory effort: No increased work of breathing or signs of respiratory distress  Auscultation of lungs: Clear to auscultation, equal breath sounds bilaterally, no wheezes, no rales, no rhonci  Cardiovascular   Auscultation of heart: Normal rate and rhythm, normal S1 and S2, without murmurs  Examination of extremities for edema and/or varicosities: Normal     Carotid pulses: Normal     Abdomen   Abdomen: Non-tender, no masses  Liver and spleen: No hepatomegaly or splenomegaly  Lymphatic   Palpation of lymph nodes in neck: No lymphadenopathy  Musculoskeletal   Gait and station: Abnormal   Ambulating with a walker  Skin   Skin and subcutaneous tissue: Normal without rashes or lesions  Neurologic   Cranial nerves: Cranial nerves 2-12 intact  Sensation: No sensory loss  Psychiatric   Orientation to person, place and time: Normal     Mood and affect: Normal          Health Management  Screening for colorectal cancer   COLONOSCOPY; every 5 years; Last 74Pbb6362; Next Due: 10NTS1318; Active  Type 2 diabetes mellitus   *VB - Eye Exam; every 1 year; Last 92Voc7519; Next Due: 77UFJ2823; Overdue  Health Maintenance   Medicare Annual Wellness Visit; every 1 year; Next Due: 32Ckh4883; Overdue    Attending Note  Collaborating Physician Note: Collaborating Physician: I did not interview and examine the patient and I agree with the Advanced Practitioner note  Future Appointments    Date/Time Provider Specialty Site   05/30/2017 09:00 AM Faustino Denton MD Family Medicine [facility] FAMILY PRACTICE   05/08/2017 08:45 AM Obinna Ferrari [facility] Neurology [facility] NEUROLOGY [facility]   04/27/2017 08:30 AM Arlyn Arellano [facility] Neurosurgery [facility] NEUROSURGICAL [facility]   04/27/2017 08:45 AM LYRIC Yao   Neurosurgery [facility] NEUROSURGICAL [facility]     Signatures   Electronically signed by : RYAN Alejandro; Apr 20 2017 12:35PM EST                       (Author)    Electronically signed by :  Haylee Cloud MD; Apr 20 2017  1:33PM EST                       (Author)

## 2018-01-14 VITALS
TEMPERATURE: 97.6 F | SYSTOLIC BLOOD PRESSURE: 110 MMHG | HEIGHT: 66 IN | RESPIRATION RATE: 20 BRPM | HEART RATE: 66 BPM | WEIGHT: 188 LBS | BODY MASS INDEX: 30.22 KG/M2 | DIASTOLIC BLOOD PRESSURE: 56 MMHG

## 2018-01-14 VITALS
DIASTOLIC BLOOD PRESSURE: 74 MMHG | WEIGHT: 183.8 LBS | RESPIRATION RATE: 30 BRPM | TEMPERATURE: 97.1 F | SYSTOLIC BLOOD PRESSURE: 156 MMHG | BODY MASS INDEX: 29.54 KG/M2 | HEIGHT: 66 IN | HEART RATE: 60 BPM

## 2018-01-14 VITALS
HEART RATE: 78 BPM | HEIGHT: 66 IN | BODY MASS INDEX: 27.56 KG/M2 | SYSTOLIC BLOOD PRESSURE: 116 MMHG | DIASTOLIC BLOOD PRESSURE: 62 MMHG | WEIGHT: 171.5 LBS

## 2018-01-14 VITALS
BODY MASS INDEX: 30.05 KG/M2 | WEIGHT: 187 LBS | HEART RATE: 68 BPM | HEIGHT: 66 IN | SYSTOLIC BLOOD PRESSURE: 134 MMHG | DIASTOLIC BLOOD PRESSURE: 75 MMHG

## 2018-01-14 NOTE — MISCELLANEOUS
Message   Recorded as Task   Date: 10/20/2017 08:57 AM, Created By: Justice Noonan   Task Name: Follow Up   Assigned To: Bari Granda   Regarding Patient: Jose J Hull, Status: Active   CommentJudye Kidney - 20 Oct 2017 8:57 AM     TASK CREATED  Caller: Self; General Medical Question; [phone] (Home)  Patient is taking BP meds and finds BP low  Readings are 120/49 at the moment yesterday 154/34 patient wants to know if thats low and should he continue to take meds  Rick Delacruz - 20 Oct 2017 5:12 PM     TASK REPLIED TO: Previously Assigned To Rick Delacruz  I called pt back  Pt states his BP was low recently  Denies lightheaded, dizzy, SOB or chest pain  Pt states his [place] doctor already took him off metoprolol recently, but his BP still low  I advised pt to take lisinopril 40mg 1/2 tab daily now  Check BP before taking lisinopril  If BP <100/60, hold it  Go to ER if any symptoms such as dizzy, lightheaded, SOB or CP etc    Check BP 2/day and call office next week about BP reading  Pt understood  Rick Delacruz - 20 Oct 2017 5:12 PM     TASK Carmen Bird - 18 Nov 2017 12:03 PM     TASK REACTIVATED   Tamy Dempsey - 02 Nov 2017 12:06 PM     TASK EDITED  Spoke to patient, he said Tuesday he goes in for a pacemaker, and his BP readings for the past 2 weeks: Starting 10/7 111/56, 140/57, 138/53, 176/50, 132/53, 150/65, 120/69, 100/58, 148/63, 148/61, 135/56, 130/68, & Today 112/57  He wants to know if he should change the medication  Justice Noonan - 03 Nov 2017 2:47 PM     TASK EDITED  Patient called back requesting a call back from doctor troy Noonan - 03 Nov 2017 2:47 PM     TASK REASSIGNED: Previously Assigned To Berenice Lechuga - 03 Nov 2017 4:37 PM     TASK REPLIED TO: Previously Assigned To Rick Delacruz  I called pt back  Pt feels fine  No dizzy, headache, chest pain, SOB etc  Will continue the same dose of lisinopril  Signatures   Electronically signed by :  Stefani Burt MD; Nov  3 2017  4:37PM EST                       (Author)

## 2018-01-15 VITALS
BODY MASS INDEX: 28.77 KG/M2 | WEIGHT: 179 LBS | DIASTOLIC BLOOD PRESSURE: 88 MMHG | HEIGHT: 66 IN | HEART RATE: 88 BPM | TEMPERATURE: 101.9 F | SYSTOLIC BLOOD PRESSURE: 132 MMHG | OXYGEN SATURATION: 95 %

## 2018-01-16 ENCOUNTER — TRANSCRIBE ORDERS (OUTPATIENT)
Dept: LAB | Facility: CLINIC | Age: 78
End: 2018-01-16

## 2018-01-16 ENCOUNTER — APPOINTMENT (OUTPATIENT)
Dept: LAB | Facility: CLINIC | Age: 78
End: 2018-01-16
Payer: COMMERCIAL

## 2018-01-16 DIAGNOSIS — Z79.01 LONG-TERM (CURRENT) USE OF ANTICOAGULANTS: ICD-10-CM

## 2018-01-16 DIAGNOSIS — I48.0 PAROXYSMAL ATRIAL FIBRILLATION (HCC): Primary | ICD-10-CM

## 2018-01-16 DIAGNOSIS — I48.0 PAROXYSMAL ATRIAL FIBRILLATION (HCC): ICD-10-CM

## 2018-01-16 LAB
INR PPP: 1.91 (ref 0.86–1.16)
PROTHROMBIN TIME: 22.1 SECONDS (ref 12.1–14.4)

## 2018-01-16 PROCEDURE — 36415 COLL VENOUS BLD VENIPUNCTURE: CPT

## 2018-01-16 PROCEDURE — 85610 PROTHROMBIN TIME: CPT

## 2018-01-17 NOTE — RESULT NOTES
Message   Call patient  Chest X-ray showed no pneumonia  Verified Results  * XR CHEST PA & LATERAL 94VPI6612 10:03AM [facility] Order Number: [id number]     Test Name Result Flag Reference   XR CHEST PA & LATERAL (Report)     CHEST - DUAL ENERGY     INDICATION: Cough and chest pain  COMPARISON: Chest x-ray dated August 29, 2013  VIEWS: PA (including soft tissue/bone algorithms) and lateral projections; 4 images     FINDINGS:        The cardiomediastinal silhouette is unchanged from the prior exam  Postoperative changes of a prior median sternotomy are again noted  There is atherosclerotic calcification of the aortic arch  The lungs are clear  No pneumothorax or pleural effusion  Visualized osseous structures appear within normal limits for the patient's age  IMPRESSION:     No active pulmonary disease         Workstation performed: [id number]     Signed by:   Elva Vasquez MD   1/13/17

## 2018-01-18 NOTE — RESULT NOTES
Message   DW pt on 5/26/2016 OV  Verified Results  (1) CBC/PLT/DIFF 58JIR8206 09:26AM Zuly Caba    Order Number: [id number]  TW Order Number: [id number]     Test Name Result Flag Reference   WBC COUNT 8 10 Thousand/uL  4 31-10 16   RBC COUNT 4 95 Million/uL  3 88-5 62   HEMOGLOBIN 13 6 g/dL  12 0-17 0   HEMATOCRIT 40 3 %  36 5-49 3   MCV 81 fL L 82-98   MCH 27 5 pg  26 8-34 3   MCHC 33 7 g/dL  31 4-37 4   RDW 14 7 %  11 6-15 1   MPV 10 8 fL  8 9-12 7   PLATELET COUNT 399 Thousands/uL  149-390   nRBC AUTOMATED 0 /100 WBCs     NEUTROPHILS RELATIVE PERCENT 63 %  43-75   LYMPHOCYTES RELATIVE PERCENT 28 %  14-44   MONOCYTES RELATIVE PERCENT 8 %  4-12   EOSINOPHILS RELATIVE PERCENT 1 %  0-6   BASOPHILS RELATIVE PERCENT 0 %  0-1   NEUTROPHILS ABSOLUTE COUNT 5 10 Thousands/?L  1 85-7 62   LYMPHOCYTES ABSOLUTE COUNT 2 25 Thousands/?L  0 60-4 47   MONOCYTES ABSOLUTE COUNT 0 61 Thousand/?L  0 17-1 22   EOSINOPHILS ABSOLUTE COUNT 0 11 Thousand/?L  0 00-0 61   BASOPHILS ABSOLUTE COUNT 0 02 Thousands/?L  0 00-0 10     (1) COMPREHENSIVE METABOLIC PANEL 87RJL4380 54:55PP Zuly Caba    Order Number: [id number]  TW Order Number: [id number] Order Number: [id number] Order Number: [id number]     Test Name Result Flag Reference   GLUCOSE,RANDM 88 mg/dL     If the patient is fasting, the ADA then defines impaired fasting glucose as > 100 mg/dL and diabetes as > or equal to 123 mg/dL     SODIUM 142 mmol/L  136-145   POTASSIUM 4 6 mmol/L  3 5-5 3   CHLORIDE 109 mmol/L H 100-108   CARBON DIOXIDE 31 mmol/L  21-32   ANION GAP (CALC) 2 mmol/L L 4-13   BLOOD UREA NITROGEN 19 mg/dL  5-25   CREATININE 1 03 mg/dL  0 60-1 30   Standardized to IDMS reference method   CALCIUM 9 0 mg/dL  8 3-10 1   BILI, TOTAL 0 76 mg/dL  0 20-1 00   ALK PHOSPHATAS 92 U/L     ALT (SGPT) 20 U/L  12-78   AST(SGOT) 12 U/L  5-45   ALBUMIN 3 9 g/dL  3 5-5 0   TOTAL PROTEIN 7 1 g/dL 6  4-8 2   eGFR Non-African American      >60 0 ml/min/1 73sq m   [facility] Disease Education Program recommendations are as follows:  GFR calculation is accurate only with a steady state creatinine  Chronic Kidney disease less than 60 ml/min/1 73 sq  meters  Kidney failure less than 15 ml/min/1 73 sq  meters  (1) HEMOGLOBIN A1C 95VJS1020 09:26AM [employer]   TW Order Number: [id number]  TW Order Number: [id number]     Test Name Result Flag Reference   HEMOGLOBIN A1C 6 3 %  4 2-6 3   EST  AVG  GLUCOSE 134 mg/dl       (1) LIPID PANEL FASTING W DIRECT LDL REFLEX 60XWD4700 09:26AM [employer]   TW Order Number: [id number]  TW Order Number: [id number] Order Number: [id number] Order Number: [id number]     Test Name Result Flag Reference   CHOLESTEROL 108 mg/dL     LDL CHOLESTEROL CALCULATED 58 mg/dL  0-100   Triglyceride:         Normal              <150 mg/dl       Borderline High    150-199 mg/dl       High               200-499 mg/dl       Very High          >499 mg/dl  Cholesterol:         Desirable        <200 mg/dl      Borderline High  200-239 mg/dl      High             >239 mg/dl  HDL Cholesterol:        High    >59 mg/dL      Low     <41 mg/dL  LDL Cholesterol:        Optimal          <100 mg/dl        Near Optimal     100-129 mg/dl        Above Optimal          Borderline High   130-159 mg/dl          High              160-189 mg/dl          Very High        >189 mg/dl  LDL CALCULATED:    This screening LDL is a calculated result  It does not have the accuracy of the Direct Measured LDL in the monitoring of patients with hyperlipidemia and/or statin therapy  Direct Measure LDL (KXT469) must be ordered separately in these patients  TRIGLYCERIDES 80 mg/dL  <=150   Specimen collection should occur prior to N-Acetylcysteine or Metamizole administration due to the potential for falsely depressed results     HDL,DIRECT 34 mg/dL L 40-60 Specimen collection should occur prior to Metamizole administration due to the potential for falsely depressed results       (1) TSH WITH FT4 REFLEX 92TAW9660 09:26AM [employer]   TW Order Number: [id number]  TW Order Number: [id number] Order Number: [id number] Order Number: [id number]     Test Name Result Flag Reference   TSH 1 260 uIU/mL  0 358-3 740     (1) MICROALBUMIN CREATININE RATIO, RANDOM URINE 81NMM4339 09:26AM [employer]    Order Number: [id number]  TW Order Number: [id number]     Test Name Result Flag Reference   MICROALBUMIN/ CREAT R 176 mg/g creatinine H 0-30   MICROALBUMIN,URINE 178 0 mg/L H 0 0-20 0   CREATININE URINE 101 0 mg/dL

## 2018-01-18 NOTE — MISCELLANEOUS
Message   Recorded as Task   Date: 10/18/2017 05:30 PM, Created By: Cheryle Hipps   Task Name: Call Patient with results   Assigned To: [facility] [facility],Team   Regarding Patient: Emily Chinchilla, Status: Active   Comment:    Kapil Gould - 18 Oct 2017 5:30 PM     Patient Phone: [phone]      Please notify the patient  that the x-ray of his lumbar spine revealed moderate degenerative changes of the lumbar spine and degenerative disc disease  I can offer the patient bilateral L3, L4, and L5  diagnostic medial branch blocks  If the patient has a favorable response x2 we could proceed with RFA for longer lasting relief  Cynthia Sweeney - 81 Oct 2017 1:14 PM     TASK REASSIGNED: Previously Assigned To Princess Dinh - 67 Oct 2017 3:08 PM     TASK EDITED  S/W pt  Advised pt of the same  Pt stated he takes 1 tablet of extra strength tylenol in the am and by noon his pain is a 0/10  Pt has pain in left lower back, in the am before the tylenol pain is a 4/10, "it's not much, it gradually gets better "  Pt stated he wants to hold off on scheduling and when the pain is worse he will call [facility] to schedule the procedure  Cheryle Hipps - 19 Oct 2017 3:31 PM     TASK REPLIED TO: Previously Assigned To Kapil Gould  aware        Active Problems    1  Actinic keratosis (702 0) (L57 0)   2  Allergic rhinitis, unspecified allergic rhinitis type   3  Asymptomatic carotid artery stenosis (433 10) (I65 29)   4  Benign essential hypertension (401 1) (I10)   5  Cerebrovascular accident (CVA) (434 91) (I63 9)   6  Chronic kidney disease, stage 3 (585 3) (N18 3)   7  Controlled diabetes mellitus with kidney complication (819 54) (I19 39)   8  Coronary artery disease (414 00) (I25 10)   9  Diabetes Mellitus (250 00)   10  Diabetic Peripheral Neuropathy (357 2)   11  Erectile dysfunction of non-organic origin (302 72) (F52 21)   12  Gait instability (781 2) (R26 81)   13   Hospital discharge follow-up (V67 59) (Z09) 14  Hyperlipidemia (272 4) (E78 5)   15  Left lateral abdominal pain (789 09) (R10 9)   16  Left low back pain (724 2) (M54 5)   17  Medicare annual wellness visit, initial (V70 0) (Z00 00)   18  Medicare annual wellness visit, subsequent (V70 0) (Z00 00)   19  Myelomalacia (336 8) (G95 89)   20  Myofascial pain (729 1) (M79 1)   21  Need for pneumococcal vaccination (V03 82) (Z23)   22  Need for vaccination with 13-polyvalent pneumococcal conjugate vaccine (V03 82) (Z23)   23  Nephrolithiasis (592 0) (N20 0)   24  Obstructive sleep apnea (327 23) (G47 33)   25  Osteoarthritis (715 90) (M19 90)   26  Peripheral vascular disease (443 9) (I73 9)   27  Post-nasal drip (784 91) (R09 82)   28  Pulmonary nodule seen on imaging study (793 11) (R91 1)   29  Screening for colorectal cancer (V76 51) (Z12 11,Z12 12)   30  Type 2 diabetes mellitus (250 00) (E11 9)    Current Meds   1  Aspirin 325 MG Oral Tablet; TAKE 1 TABLET DAILY; Therapy: 05WQX6405 to (Evaluate:17Oct2017); Last Rx:56Kmp9254 Ordered   2  Atorvastatin Calcium 40 MG Oral Tablet; Take 1 tablet daily; Last Rx:16Vbl1765 Ordered   3  Claritin 10 MG Oral Tablet; TAKE 1/2 TABLET DAILY; Therapy: (Recorded:67Lbh7974) to Recorded   4  Lisinopril 40 MG Oral Tablet; TAKE 1 TABLET DAILY; Therapy: 89LJV7862 to (Evaluate:92Wwg1866)  Requested for: 86Soe9523; Last   Rx:76Zqd5700 Ordered   5  MetFORMIN HCl - 500 MG Oral Tablet; take 1 tablet every twelve hours; Therapy: 95DHB0048 to ((54) 6713-6472)  Requested for: 26Sih5699; Last   Rx:20Prg0163 Ordered   6  Metoprolol Tartrate 50 MG Oral Tablet; take one tablet by mouth twice daily; Therapy: 59NKH2984 to (Evaluate:84Weo3244)  Requested for: 50Gkn2716; Last   Rx:14Nov2016 Ordered   7  NovoLIN N 100 UNIT/ML Subcutaneous Suspension; Inject 55 units in the morning   before breakfast and 30 units in the evening before dinner or as directed; Therapy: 59MDR8192 to (Evaluate:01Nov2015); Last JY:20LOF9787 Ordered   8  Vitamin B-12 1000 MCG Oral Tablet; TAKE 1 TABLET DAILY AS DIRECTED; Therapy: 18ZFL7925 to (Evaluate:31Uaf7801); Last Rx:76Ebe8669 Ordered   9  Warfarin Sodium 5 MG Oral Tablet (Coumadin); TAKE as instructed; Therapy: 87Kij4460 to (Evaluate:38Wch5872); Last Rx:16Qff5830 Ordered    Allergies    1   FLU    Signatures   Electronically signed by : Senthil Munroe, ; Oct 19 2017  3:36PM EST                       (Author)

## 2018-01-18 NOTE — RESULT NOTES
Verified Results  * XR SPINE LUMBAR MINIMUM 4 VIEWS NON INJURY [id number] 02:08PM Vickie Manriquez Order Number: [id number]     Test Name Result Flag Reference   XR SPINE LUMBAR MINIMUM 4 VIEWS (Report)     LUMBAR SPINE     INDICATION: Lower back pain  COMPARISON: CT abdomen pelvis 9/1/2017     VIEWS: AP, lateral and bilateral oblique projections;      IMAGES: 4     FINDINGS:     Alignment is unremarkable  There is no radiographic evidence of acute fracture or destructive osseous lesion  Moderate degenerative changes of the lumbar spine with mild disc height loss and osteophyte formation  Atherosclerotic vascular calcifications are noted  Visualized soft tissues appear otherwise unremarkable  IMPRESSION:     No acute osseous abnormality  Degenerative changes as described         Workstation performed: [id number]     Signed by:   Anjum Balderas MD   10/18/17

## 2018-01-22 VITALS
WEIGHT: 178.38 LBS | SYSTOLIC BLOOD PRESSURE: 138 MMHG | TEMPERATURE: 98.6 F | HEART RATE: 56 BPM | HEIGHT: 66 IN | DIASTOLIC BLOOD PRESSURE: 78 MMHG | BODY MASS INDEX: 28.67 KG/M2 | RESPIRATION RATE: 18 BRPM

## 2018-01-23 VITALS
WEIGHT: 171.25 LBS | HEIGHT: 66 IN | BODY MASS INDEX: 27.52 KG/M2 | SYSTOLIC BLOOD PRESSURE: 110 MMHG | HEART RATE: 78 BPM | DIASTOLIC BLOOD PRESSURE: 60 MMHG

## 2018-01-23 NOTE — MISCELLANEOUS
Message   Recorded as Task   Date: 12/04/2017 11:36 AM, Created By: Loni Osorio   Task Name: Miscellaneous   Assigned To: Yadira Romero   Regarding Patient: Jose J Hull, Status: Active   CommentArlys Tomity - 04 Dec 2017 11:36 AM     TASK CREATED  Repeat labs reviewed - renal function stable with a creatinine of 1 4, minimal proteinuria  Follow up as schedule in the office next month  Thanks,    GC     I spoke to the patient and he is aware of above  AG      Active Problems    1  Actinic keratosis (702 0) (L57 0)   2  Allergic rhinitis, unspecified allergic rhinitis type   3  Asymptomatic carotid artery stenosis (433 10) (I65 29)   4  Benign essential hypertension (401 1) (I10)   5  Cerebrovascular accident (CVA) (434 91) (I63 9)   6  Chronic kidney disease, stage 3 (585 3) (N18 3)   7  Controlled diabetes mellitus with kidney complication (107 44) (P42 06)   8  Coronary artery disease (414 00) (I25 10)   9  Diabetes Mellitus (250 00)   10  Diabetic Peripheral Neuropathy (357 2)   11  Erectile dysfunction of non-organic origin (302 72) (F52 21)   12  Gait instability (781 2) (R26 81)   13  Hospital discharge follow-up (V67 59) (Z09)   14  Hyperlipidemia (272 4) (E78 5)   15  Left lateral abdominal pain (789 09) (R10 9)   16  Left low back pain (724 2) (M54 5)   17  Medicare annual wellness visit, initial (V70 0) (Z00 00)   18  Medicare annual wellness visit, subsequent (V70 0) (Z00 00)   19  Myelomalacia (336 8) (G95 89)   20  Myofascial pain (729 1) (M79 1)   21  Need for pneumococcal vaccination (V03 82) (Z23)   22  Need for vaccination with 13-polyvalent pneumococcal conjugate vaccine (V03 82) (Z23)   23  Nephrolithiasis (592 0) (N20 0)   24  Obstructive sleep apnea (327 23) (G47 33)   25  Osteoarthritis (715 90) (M19 90)   26  Peripheral vascular disease (443 9) (I73 9)   27  Post-nasal drip (784 91) (R09 82)   28  Pulmonary nodule seen on imaging study (793 11) (R91 1)   29   Screening for colorectal cancer (V76 51) (Z12 11,Z12 12)   30  Type 2 diabetes mellitus (250 00) (E11 9)    Current Meds   1  Aspirin 325 MG Oral Tablet; TAKE 1 TABLET DAILY; Therapy: 46FDQ9990 to (Evaluate:17Oct2017); Last Rx:89Xgb8258 Ordered   2  Atorvastatin Calcium 40 MG Oral Tablet; Take 1 tablet daily; Last Rx:20Apr2017 Ordered   3  Claritin 10 MG Oral Tablet; TAKE 1/2 TABLET DAILY; Therapy: (Recorded:29Elk7309) to Recorded   4  Lisinopril 40 MG Oral Tablet; take 0 5 tablet daily; Therapy: 18JWU8749 to (526 0588)  Requested for: 80KSC4885; Last   Rx:20Oct2017 Ordered   5  MetFORMIN HCl - 500 MG Oral Tablet; take 1 tablet every twelve hours; Therapy: 63JCG5625 to (52 187037)  Requested for: 09Qwo7824; Last   Rx:80Jsw6096 Ordered   6  NovoLIN N 100 UNIT/ML Subcutaneous Suspension; Inject 55 units in the morning   before breakfast and 30 units in the evening before dinner or as directed; Therapy: 57OIB2683 to (Evaluate:01Nov2015); Last RL:17OHH3241 Ordered   7  Vitamin B-12 1000 MCG Oral Tablet; TAKE 1 TABLET DAILY AS DIRECTED; Therapy: 48DKG2952 to (Evaluate:02Whb7365); Last Rx:32Qbk1258 Ordered   8  Warfarin Sodium 5 MG Oral Tablet (Coumadin); TAKE as instructed; Therapy: 89Spo4058 to (Evaluate:26Jny1738); Last Rx:53Nqc4744 Ordered    Allergies    1   FLU    Signatures   Electronically signed by : LYRIC Summers ; Dec  6 2017  8:14AM EST

## 2018-01-29 ENCOUNTER — TRANSCRIBE ORDERS (OUTPATIENT)
Dept: LAB | Facility: CLINIC | Age: 78
End: 2018-01-29

## 2018-01-29 ENCOUNTER — APPOINTMENT (OUTPATIENT)
Dept: LAB | Facility: CLINIC | Age: 78
End: 2018-01-29
Payer: COMMERCIAL

## 2018-01-29 DIAGNOSIS — I48.91 ATRIAL FIBRILLATION, UNSPECIFIED TYPE (HCC): Primary | ICD-10-CM

## 2018-01-29 DIAGNOSIS — Z79.01 LONG-TERM (CURRENT) USE OF ANTICOAGULANTS: ICD-10-CM

## 2018-01-29 LAB
INR PPP: 1.84 (ref 0.86–1.16)
PROTHROMBIN TIME: 21.4 SECONDS (ref 12.1–14.4)

## 2018-01-29 PROCEDURE — 85610 PROTHROMBIN TIME: CPT

## 2018-01-29 PROCEDURE — 36415 COLL VENOUS BLD VENIPUNCTURE: CPT

## 2018-02-01 DIAGNOSIS — N18.30 CHRONIC KIDNEY DISEASE, STAGE III (MODERATE) (HCC): ICD-10-CM

## 2018-02-05 ENCOUNTER — APPOINTMENT (OUTPATIENT)
Dept: LAB | Facility: CLINIC | Age: 78
End: 2018-02-05
Payer: COMMERCIAL

## 2018-02-05 ENCOUNTER — TRANSCRIBE ORDERS (OUTPATIENT)
Dept: LAB | Facility: CLINIC | Age: 78
End: 2018-02-05

## 2018-02-05 DIAGNOSIS — I48.0 PAROXYSMAL ATRIAL FIBRILLATION (HCC): Primary | ICD-10-CM

## 2018-02-05 DIAGNOSIS — Z79.01 LONG-TERM (CURRENT) USE OF ANTICOAGULANTS: ICD-10-CM

## 2018-02-05 LAB
INR PPP: 2.74 (ref 0.86–1.16)
PROTHROMBIN TIME: 29.4 SECONDS (ref 12.1–14.4)

## 2018-02-05 PROCEDURE — 36415 COLL VENOUS BLD VENIPUNCTURE: CPT

## 2018-02-05 PROCEDURE — 85610 PROTHROMBIN TIME: CPT

## 2018-02-12 ENCOUNTER — APPOINTMENT (OUTPATIENT)
Dept: LAB | Facility: CLINIC | Age: 78
End: 2018-02-12
Payer: COMMERCIAL

## 2018-02-12 ENCOUNTER — TRANSCRIBE ORDERS (OUTPATIENT)
Dept: LAB | Facility: CLINIC | Age: 78
End: 2018-02-12

## 2018-02-12 DIAGNOSIS — I48.0 PAROXYSMAL ATRIAL FIBRILLATION (HCC): Primary | ICD-10-CM

## 2018-02-12 DIAGNOSIS — Z79.01 LONG-TERM (CURRENT) USE OF ANTICOAGULANTS: ICD-10-CM

## 2018-02-12 LAB
INR PPP: 2.92 (ref 0.86–1.16)
PROTHROMBIN TIME: 30.9 SECONDS (ref 12.1–14.4)

## 2018-02-12 PROCEDURE — 36415 COLL VENOUS BLD VENIPUNCTURE: CPT

## 2018-02-12 PROCEDURE — 85610 PROTHROMBIN TIME: CPT

## 2018-03-05 ENCOUNTER — APPOINTMENT (OUTPATIENT)
Dept: LAB | Facility: CLINIC | Age: 78
End: 2018-03-05
Payer: COMMERCIAL

## 2018-03-05 DIAGNOSIS — I48.0 PAROXYSMAL ATRIAL FIBRILLATION (HCC): ICD-10-CM

## 2018-03-05 DIAGNOSIS — Z79.01 LONG-TERM (CURRENT) USE OF ANTICOAGULANTS: ICD-10-CM

## 2018-03-05 LAB
INR PPP: 2.49 (ref 0.86–1.16)
PROTHROMBIN TIME: 27.2 SECONDS (ref 12.1–14.4)

## 2018-03-05 PROCEDURE — 85610 PROTHROMBIN TIME: CPT

## 2018-03-05 PROCEDURE — 36415 COLL VENOUS BLD VENIPUNCTURE: CPT

## 2018-03-13 DIAGNOSIS — E11.8 TYPE 2 DIABETES MELLITUS WITH COMPLICATION, WITHOUT LONG-TERM CURRENT USE OF INSULIN (HCC): Primary | ICD-10-CM

## 2018-04-02 ENCOUNTER — APPOINTMENT (OUTPATIENT)
Dept: LAB | Facility: CLINIC | Age: 78
End: 2018-04-02
Payer: COMMERCIAL

## 2018-04-02 ENCOUNTER — TRANSCRIBE ORDERS (OUTPATIENT)
Dept: LAB | Facility: CLINIC | Age: 78
End: 2018-04-02

## 2018-04-02 DIAGNOSIS — I48.0 PAROXYSMAL ATRIAL FIBRILLATION (HCC): Primary | ICD-10-CM

## 2018-04-02 DIAGNOSIS — I48.0 PAROXYSMAL ATRIAL FIBRILLATION (HCC): ICD-10-CM

## 2018-04-02 LAB
INR PPP: 2.17 (ref 0.86–1.16)
PROTHROMBIN TIME: 24.4 SECONDS (ref 12.1–14.4)

## 2018-04-02 PROCEDURE — 85610 PROTHROMBIN TIME: CPT

## 2018-04-02 PROCEDURE — 36415 COLL VENOUS BLD VENIPUNCTURE: CPT

## 2018-04-16 DIAGNOSIS — E11.8 TYPE 2 DIABETES MELLITUS WITH COMPLICATION, WITHOUT LONG-TERM CURRENT USE OF INSULIN (HCC): ICD-10-CM

## 2018-04-23 RX ORDER — LISINOPRIL 40 MG/1
0.5 TABLET ORAL DAILY
COMMUNITY
Start: 2014-07-25 | End: 2018-04-24 | Stop reason: ALTCHOICE

## 2018-04-23 RX ORDER — WARFARIN SODIUM 5 MG/1
5 TABLET ORAL
COMMUNITY
Start: 2017-08-30

## 2018-04-24 ENCOUNTER — OFFICE VISIT (OUTPATIENT)
Dept: FAMILY MEDICINE CLINIC | Facility: CLINIC | Age: 78
End: 2018-04-24
Payer: COMMERCIAL

## 2018-04-24 VITALS
TEMPERATURE: 98.4 F | RESPIRATION RATE: 20 BRPM | DIASTOLIC BLOOD PRESSURE: 70 MMHG | SYSTOLIC BLOOD PRESSURE: 158 MMHG | WEIGHT: 193.8 LBS | BODY MASS INDEX: 31.76 KG/M2 | HEART RATE: 60 BPM

## 2018-04-24 DIAGNOSIS — I63.9 CEREBROVASCULAR ACCIDENT (CVA), UNSPECIFIED MECHANISM (HCC): ICD-10-CM

## 2018-04-24 DIAGNOSIS — R26.81 GAIT INSTABILITY: ICD-10-CM

## 2018-04-24 DIAGNOSIS — I10 BENIGN ESSENTIAL HYPERTENSION: ICD-10-CM

## 2018-04-24 DIAGNOSIS — E11.22 TYPE 2 DIABETES MELLITUS WITH STAGE 3 CHRONIC KIDNEY DISEASE, WITH LONG-TERM CURRENT USE OF INSULIN (HCC): ICD-10-CM

## 2018-04-24 DIAGNOSIS — I10 BENIGN ESSENTIAL HYPERTENSION: Primary | ICD-10-CM

## 2018-04-24 DIAGNOSIS — I48.0 PAF (PAROXYSMAL ATRIAL FIBRILLATION) (HCC): ICD-10-CM

## 2018-04-24 DIAGNOSIS — N18.30 TYPE 2 DIABETES MELLITUS WITH STAGE 3 CHRONIC KIDNEY DISEASE, WITH LONG-TERM CURRENT USE OF INSULIN (HCC): ICD-10-CM

## 2018-04-24 DIAGNOSIS — E78.2 MIXED HYPERLIPIDEMIA: ICD-10-CM

## 2018-04-24 DIAGNOSIS — Z79.4 TYPE 2 DIABETES MELLITUS WITH STAGE 3 CHRONIC KIDNEY DISEASE, WITH LONG-TERM CURRENT USE OF INSULIN (HCC): ICD-10-CM

## 2018-04-24 DIAGNOSIS — R05.3 CHRONIC COUGH: ICD-10-CM

## 2018-04-24 PROBLEM — R09.89 ABDOMINAL BRUIT: Status: ACTIVE | Noted: 2017-05-05

## 2018-04-24 PROBLEM — I77.9 BILATERAL CAROTID ARTERY DISEASE (HCC): Status: ACTIVE | Noted: 2017-03-27

## 2018-04-24 PROBLEM — G95.89 MYELOMALACIA (HCC): Status: ACTIVE | Noted: 2017-04-20

## 2018-04-24 PROBLEM — E11.29 TYPE 2 DIABETES MELLITUS WITH KIDNEY COMPLICATION, WITH LONG-TERM CURRENT USE OF INSULIN (HCC): Status: ACTIVE | Noted: 2017-03-25

## 2018-04-24 PROBLEM — M54.50 LEFT LOW BACK PAIN: Status: ACTIVE | Noted: 2017-08-29

## 2018-04-24 PROBLEM — Z95.0 S/P CARDIAC PACEMAKER PROCEDURE: Status: ACTIVE | Noted: 2017-11-13

## 2018-04-24 PROBLEM — I49.5 TACHY-BRADY SYNDROME (HCC): Status: ACTIVE | Noted: 2017-05-05

## 2018-04-24 PROBLEM — M79.18 MYOFASCIAL PAIN: Status: ACTIVE | Noted: 2017-10-16

## 2018-04-24 PROCEDURE — 3725F SCREEN DEPRESSION PERFORMED: CPT | Performed by: FAMILY MEDICINE

## 2018-04-24 PROCEDURE — 99214 OFFICE O/P EST MOD 30 MIN: CPT | Performed by: FAMILY MEDICINE

## 2018-04-24 PROCEDURE — 1101F PT FALLS ASSESS-DOCD LE1/YR: CPT | Performed by: FAMILY MEDICINE

## 2018-04-24 RX ORDER — AMLODIPINE BESYLATE 5 MG/1
TABLET ORAL
Qty: 90 TABLET | Refills: 1 | Status: SHIPPED | OUTPATIENT
Start: 2018-04-24 | End: 2018-10-15 | Stop reason: SDUPTHER

## 2018-04-24 RX ORDER — AMLODIPINE BESYLATE 5 MG/1
5 TABLET ORAL DAILY
Qty: 30 TABLET | Refills: 5 | Status: SHIPPED | OUTPATIENT
Start: 2018-04-24 | End: 2018-04-24 | Stop reason: SDUPTHER

## 2018-04-24 NOTE — PROGRESS NOTES
Chief Complaint   Patient presents with    Follow-up     Routine follow up, complains of productive cough for 1 year  Health Maintenance   Topic Date Due    SLP PLAN OF CARE  1940    DTaP,Tdap,and Td Vaccines (1 - Tdap) 10/14/1961    Diabetic Foot Exam  11/05/2014    COLONOSCOPY  04/14/2015    URINE MICROALBUMIN  05/21/2017    Annual [facility] Visit (AWV)  05/26/2017    INFLUENZA VACCINE  09/01/2017    GLAUCOMA SCREENING 67+ YR  02/16/2018    OPHTHALMOLOGY EXAM  02/16/2018    HEMOGLOBIN A1C  03/27/2018    Fall Risk  05/30/2018    Depression Screening PHQ-9  05/30/2018    PNEUMOCOCCAL POLYSACCHARIDE VACCINE AGE 72 AND OVER  Completed     Assessment/Plan:    Stop lisinopril because of chronic cough  Check CXR  Take amlodipine 5mg Qd now  BP elevated in office today  Pt denies symptoms like headache, vision change, SOB or chest pain  DASH diet  Check BP at home 2/day and call office if BP always >140/90  Advised pt to drop off BP log in 2 weeks  Continue other medications  FU specialists as scheduled  Got PCV 13 and pneumovax  RTO in 6 months  Diagnoses and all orders for this visit:    Benign essential hypertension  -     amLODIPine (NORVASC) 5 mg tablet; Take 1 tablet (5 mg total) by mouth daily for 30 days  -     CBC and differential; Future  -     Comprehensive metabolic panel; Future  -     TSH, 3rd generation with T4 reflex; Future    PAF (paroxysmal atrial fibrillation) (HCC)  Comments: On coumadin  Cardiology FU PT/INR  Type 2 diabetes mellitus with stage 3 chronic kidney disease, with long-term current use of insulin (HCC)  Comments: Will check labs  Continue metformin and insulin  Orders:  -     HEMOGLOBIN A1C W/ EAG ESTIMATION; Future  -     Microalbumin / creatinine urine ratio; Future    Gait instability  Comments:  Use walker  Fall precautions  Mixed hyperlipidemia  Comments:  Low fat diet  Continue atorvastatin     Orders:  -     Lipid Panel with Direct LDL reflex; Future    Cerebrovascular accident (CVA), unspecified mechanism ([address] )  Comments:  Right leg weakness  use walker  Chronic cough  -     XR chest pa & lateral; Future          Subjective:      Patient ID: Piper Kaminski is a 68 y o  male  HPI  Pt is here by himself  C/o cough with phlegm for 1 years  Worse at night  Denies fever, chest pain, SOB, wheezing, n/v/abd pain  He is on lisinopril for years  HTN----He is on metoprolol 50mg bid and lisinopril 20mg Qd  Hx of CVA in 3/2017  Right leg feels weakness  Use walker everyday  Afib---He is on coumadin  S/p packemaker 11/2017  FU cardiology from [facility] regularly  Cardiology follows PT/INR  DM---5/2017 hgA1C 7 1 He is on meformin 500mg bid and novolin 30u and 20u daily  Denies neuropathy  Fu opthalmology Dr Gunnar Bell regularly  FU podiatry in [place] Q 3 months  CKD3---FU nephrology Q 6 month  12/2017 Cr 1 44 and GFR 47    Hyperlipidemia---on atorvastatin 40mg qhs  Denies side effects  Live by himself in [facility] senior living apartment  Does all ADL's  Denies recent falls  Denies depression  The following portions of the patient's history were reviewed and updated as appropriate: allergies, current medications, past family history, past medical history, past social history, past surgical history and problem list     Review of Systems   Constitutional: Negative for appetite change, chills and fever  HENT: Negative for congestion, ear pain, sinus pain and sore throat  Eyes: Negative for discharge and itching  Respiratory: Negative for apnea, cough, chest tightness, shortness of breath and wheezing  Cardiovascular: Negative for chest pain, palpitations and leg swelling  Gastrointestinal: Negative for abdominal pain, anal bleeding, constipation, diarrhea, nausea and vomiting  Endocrine: Negative for cold intolerance, heat intolerance and polyuria     Genitourinary: Negative for difficulty urinating and dysuria  Musculoskeletal: Negative for arthralgias, back pain and myalgias  Skin: Negative for rash  Neurological: Negative for dizziness and headaches  Psychiatric/Behavioral: Negative for agitation  Objective:      /70 (BP Location: Left arm, Patient Position: Sitting, Cuff Size: Standard)   Pulse 60   Temp 98 4 °F (36 9 °C) (Oral)   Resp 20   Wt 87 9 kg (193 lb 12 8 oz)   BMI 31 76 kg/m²          Physical Exam   Constitutional: He appears well-developed  HENT:   Head: Normocephalic and atraumatic  Eyes: Conjunctivae are normal  Pupils are equal, round, and reactive to light  Neck: Normal range of motion  Neck supple  Cardiovascular: Normal rate, regular rhythm, normal heart sounds and intact distal pulses  Pulmonary/Chest: Effort normal and breath sounds normal    Abdominal: Soft  Bowel sounds are normal    Musculoskeletal:   Use walker   Neurological: He is alert

## 2018-04-26 ENCOUNTER — LAB (OUTPATIENT)
Dept: LAB | Facility: CLINIC | Age: 78
End: 2018-04-26
Payer: COMMERCIAL

## 2018-04-26 ENCOUNTER — HOSPITAL ENCOUNTER (OUTPATIENT)
Dept: RADIOLOGY | Facility: HOSPITAL | Age: 78
Discharge: HOME | End: 2018-04-26
Payer: COMMERCIAL

## 2018-04-26 ENCOUNTER — LAB (OUTPATIENT)
Dept: LAB | Facility: HOSPITAL | Age: 78
End: 2018-04-26
Payer: COMMERCIAL

## 2018-04-26 DIAGNOSIS — N18.30 TYPE 2 DIABETES MELLITUS WITH STAGE 3 CHRONIC KIDNEY DISEASE, WITH LONG-TERM CURRENT USE OF INSULIN (HCC): ICD-10-CM

## 2018-04-26 DIAGNOSIS — I10 BENIGN ESSENTIAL HYPERTENSION: ICD-10-CM

## 2018-04-26 DIAGNOSIS — Z79.4 TYPE 2 DIABETES MELLITUS WITH STAGE 3 CHRONIC KIDNEY DISEASE, WITH LONG-TERM CURRENT USE OF INSULIN (HCC): ICD-10-CM

## 2018-04-26 DIAGNOSIS — E78.2 MIXED HYPERLIPIDEMIA: ICD-10-CM

## 2018-04-26 DIAGNOSIS — E11.22 TYPE 2 DIABETES MELLITUS WITH STAGE 3 CHRONIC KIDNEY DISEASE, WITH LONG-TERM CURRENT USE OF INSULIN (HCC): ICD-10-CM

## 2018-04-26 DIAGNOSIS — R05.3 CHRONIC COUGH: ICD-10-CM

## 2018-04-26 DIAGNOSIS — I48.0 PAROXYSMAL ATRIAL FIBRILLATION (HCC): ICD-10-CM

## 2018-04-26 LAB
ALBUMIN SERPL BCP-MCNC: 3.8 G/DL (ref 3.5–5)
ALP SERPL-CCNC: 86 U/L (ref 46–116)
ALT SERPL W P-5'-P-CCNC: 35 U/L (ref 12–78)
ANION GAP SERPL CALCULATED.3IONS-SCNC: 8 MMOL/L (ref 4–13)
AST SERPL W P-5'-P-CCNC: 27 U/L (ref 5–45)
BASOPHILS # BLD AUTO: 0.02 THOUSANDS/ΜL (ref 0–0.1)
BASOPHILS NFR BLD AUTO: 0 % (ref 0–1)
BILIRUB SERPL-MCNC: 1.12 MG/DL (ref 0.2–1)
BUN SERPL-MCNC: 15 MG/DL (ref 5–25)
CALCIUM SERPL-MCNC: 9.7 MG/DL (ref 8.3–10.1)
CHLORIDE SERPL-SCNC: 106 MMOL/L (ref 100–108)
CHOLEST SERPL-MCNC: 133 MG/DL (ref 50–200)
CO2 SERPL-SCNC: 29 MMOL/L (ref 21–32)
CREAT SERPL-MCNC: 1.35 MG/DL (ref 0.6–1.3)
CREAT UR-MCNC: 107 MG/DL
EOSINOPHIL # BLD AUTO: 0.14 THOUSAND/ΜL (ref 0–0.61)
EOSINOPHIL NFR BLD AUTO: 2 % (ref 0–6)
ERYTHROCYTE [DISTWIDTH] IN BLOOD BY AUTOMATED COUNT: 14.6 % (ref 11.6–15.1)
EST. AVERAGE GLUCOSE BLD GHB EST-MCNC: 166 MG/DL
GFR SERPL CREATININE-BSD FRML MDRD: 50 ML/MIN/1.73SQ M
GLUCOSE P FAST SERPL-MCNC: 101 MG/DL (ref 65–99)
HBA1C MFR BLD: 7.4 % (ref 4.2–6.3)
HCT VFR BLD AUTO: 44.1 % (ref 36.5–49.3)
HDLC SERPL-MCNC: 32 MG/DL (ref 40–60)
HGB BLD-MCNC: 15.1 G/DL (ref 12–17)
INR PPP: 2.36 (ref 0.86–1.16)
LDLC SERPL CALC-MCNC: 66 MG/DL (ref 0–100)
LYMPHOCYTES # BLD AUTO: 2.5 THOUSANDS/ΜL (ref 0.6–4.47)
LYMPHOCYTES NFR BLD AUTO: 28 % (ref 14–44)
MCH RBC QN AUTO: 28 PG (ref 26.8–34.3)
MCHC RBC AUTO-ENTMCNC: 34.2 G/DL (ref 31.4–37.4)
MCV RBC AUTO: 82 FL (ref 82–98)
MICROALBUMIN UR-MCNC: 72.1 MG/L (ref 0–20)
MICROALBUMIN/CREAT 24H UR: 67 MG/G CREATININE (ref 0–30)
MONOCYTES # BLD AUTO: 0.82 THOUSAND/ΜL (ref 0.17–1.22)
MONOCYTES NFR BLD AUTO: 9 % (ref 4–12)
NEUTROPHILS # BLD AUTO: 5.42 THOUSANDS/ΜL (ref 1.85–7.62)
NEUTS SEG NFR BLD AUTO: 61 % (ref 43–75)
NRBC BLD AUTO-RTO: 0 /100 WBCS
PLATELET # BLD AUTO: 223 THOUSANDS/UL (ref 149–390)
PMV BLD AUTO: 10.8 FL (ref 8.9–12.7)
POTASSIUM SERPL-SCNC: 3.9 MMOL/L (ref 3.5–5.3)
PROT SERPL-MCNC: 7.4 G/DL (ref 6.4–8.2)
PROTHROMBIN TIME: 26.1 SECONDS (ref 12.1–14.4)
RBC # BLD AUTO: 5.39 MILLION/UL (ref 3.88–5.62)
SODIUM SERPL-SCNC: 143 MMOL/L (ref 136–145)
TRIGL SERPL-MCNC: 177 MG/DL
TSH SERPL DL<=0.05 MIU/L-ACNC: 1.51 UIU/ML (ref 0.36–3.74)
WBC # BLD AUTO: 8.93 THOUSAND/UL (ref 4.31–10.16)

## 2018-04-26 PROCEDURE — 80061 LIPID PANEL: CPT

## 2018-04-26 PROCEDURE — 82570 ASSAY OF URINE CREATININE: CPT

## 2018-04-26 PROCEDURE — 83036 HEMOGLOBIN GLYCOSYLATED A1C: CPT

## 2018-04-26 PROCEDURE — 80053 COMPREHEN METABOLIC PANEL: CPT

## 2018-04-26 PROCEDURE — 85610 PROTHROMBIN TIME: CPT

## 2018-04-26 PROCEDURE — 85025 COMPLETE CBC W/AUTO DIFF WBC: CPT

## 2018-04-26 PROCEDURE — 82043 UR ALBUMIN QUANTITATIVE: CPT

## 2018-04-26 PROCEDURE — 71046 X-RAY EXAM CHEST 2 VIEWS: CPT

## 2018-04-26 PROCEDURE — 36415 COLL VENOUS BLD VENIPUNCTURE: CPT

## 2018-04-26 PROCEDURE — 84443 ASSAY THYROID STIM HORMONE: CPT

## 2018-04-27 NOTE — PROGRESS NOTES
Gave patient results and instructions  He is feeling better from the new blood pressure medication  He has been logging his BP and will bring it in, only one high reading at 134/59 today, 95/82, 94/60 is the average though

## 2018-05-15 ENCOUNTER — TELEPHONE (OUTPATIENT)
Dept: FAMILY MEDICINE CLINIC | Facility: CLINIC | Age: 78
End: 2018-05-15

## 2018-05-31 ENCOUNTER — APPOINTMENT (OUTPATIENT)
Dept: LAB | Facility: CLINIC | Age: 78
End: 2018-05-31
Payer: COMMERCIAL

## 2018-05-31 ENCOUNTER — TRANSCRIBE ORDERS (OUTPATIENT)
Dept: LAB | Facility: CLINIC | Age: 78
End: 2018-05-31

## 2018-05-31 DIAGNOSIS — I48.0 PAROXYSMAL ATRIAL FIBRILLATION (HCC): Primary | ICD-10-CM

## 2018-05-31 DIAGNOSIS — I48.0 PAROXYSMAL ATRIAL FIBRILLATION (HCC): ICD-10-CM

## 2018-05-31 LAB
INR PPP: 1.4 (ref 0.86–1.17)
PROTHROMBIN TIME: 17.3 SECONDS (ref 11.8–14.2)

## 2018-05-31 PROCEDURE — 36415 COLL VENOUS BLD VENIPUNCTURE: CPT

## 2018-05-31 PROCEDURE — 85610 PROTHROMBIN TIME: CPT

## 2018-06-04 DIAGNOSIS — D22.9 NUMEROUS MOLES: Primary | ICD-10-CM

## 2018-06-07 ENCOUNTER — APPOINTMENT (OUTPATIENT)
Dept: LAB | Facility: CLINIC | Age: 78
End: 2018-06-07
Payer: COMMERCIAL

## 2018-06-07 DIAGNOSIS — I48.0 PAROXYSMAL ATRIAL FIBRILLATION (HCC): ICD-10-CM

## 2018-06-07 LAB
INR PPP: 2.89 (ref 0.86–1.17)
PROTHROMBIN TIME: 30.3 SECONDS (ref 11.8–14.2)

## 2018-06-07 PROCEDURE — 36415 COLL VENOUS BLD VENIPUNCTURE: CPT

## 2018-06-07 PROCEDURE — 85610 PROTHROMBIN TIME: CPT

## 2018-06-14 ENCOUNTER — APPOINTMENT (OUTPATIENT)
Dept: LAB | Facility: CLINIC | Age: 78
End: 2018-06-14
Payer: COMMERCIAL

## 2018-06-14 DIAGNOSIS — I48.0 PAROXYSMAL ATRIAL FIBRILLATION (HCC): ICD-10-CM

## 2018-06-14 LAB
INR PPP: 2.58 (ref 0.86–1.17)
PROTHROMBIN TIME: 27.7 SECONDS (ref 11.8–14.2)

## 2018-06-14 PROCEDURE — 85610 PROTHROMBIN TIME: CPT

## 2018-06-14 PROCEDURE — 36415 COLL VENOUS BLD VENIPUNCTURE: CPT

## 2018-06-21 ENCOUNTER — APPOINTMENT (OUTPATIENT)
Dept: LAB | Facility: CLINIC | Age: 78
End: 2018-06-21
Payer: COMMERCIAL

## 2018-06-21 DIAGNOSIS — I48.0 PAROXYSMAL ATRIAL FIBRILLATION (HCC): ICD-10-CM

## 2018-06-21 LAB
INR PPP: 2.98 (ref 0.86–1.17)
PROTHROMBIN TIME: 31 SECONDS (ref 11.8–14.2)

## 2018-06-21 PROCEDURE — 36415 COLL VENOUS BLD VENIPUNCTURE: CPT

## 2018-06-21 PROCEDURE — 85610 PROTHROMBIN TIME: CPT

## 2018-06-25 ENCOUNTER — TELEPHONE (OUTPATIENT)
Dept: NEPHROLOGY | Facility: CLINIC | Age: 78
End: 2018-06-25

## 2018-06-25 NOTE — TELEPHONE ENCOUNTER
Patient is calling because he had blood work done 2 weeks ago through the [place] and it comes up with blood in the urine  He is concerned because he doesn't see actual blood and wants to know if its a certain medication causing this  Please contact patient at number provided

## 2018-06-27 NOTE — TELEPHONE ENCOUNTER
I have called and left a message to Velasco Judy  He has history of kidney stones years ago  I am not sure if the blood they found on the urine test is related to that or anything else  I recommend to be checked by his urologist for further investigation of this microscopic hematuria  Please call patient and convey the message    Many thanks,

## 2018-06-28 NOTE — TELEPHONE ENCOUNTER
I relayed the message to the patient   He said he will follow up with a urologist and if any concerns he will give us a call

## 2018-07-13 ENCOUNTER — TRANSCRIBE ORDERS (OUTPATIENT)
Dept: LAB | Facility: CLINIC | Age: 78
End: 2018-07-13

## 2018-07-13 ENCOUNTER — APPOINTMENT (OUTPATIENT)
Dept: LAB | Facility: CLINIC | Age: 78
End: 2018-07-13
Payer: COMMERCIAL

## 2018-07-13 DIAGNOSIS — I48.0 PAROXYSMAL ATRIAL FIBRILLATION (HCC): Primary | ICD-10-CM

## 2018-07-13 DIAGNOSIS — I48.0 PAROXYSMAL ATRIAL FIBRILLATION (HCC): ICD-10-CM

## 2018-07-13 LAB
INR PPP: 1.74 (ref 0.86–1.17)
PROTHROMBIN TIME: 20.4 SECONDS (ref 11.8–14.2)

## 2018-07-13 PROCEDURE — 85610 PROTHROMBIN TIME: CPT

## 2018-07-13 PROCEDURE — 36415 COLL VENOUS BLD VENIPUNCTURE: CPT

## 2018-07-20 ENCOUNTER — APPOINTMENT (OUTPATIENT)
Dept: LAB | Facility: CLINIC | Age: 78
End: 2018-07-20
Payer: COMMERCIAL

## 2018-07-20 LAB
INR PPP: 1.78 (ref 0.86–1.17)
PROTHROMBIN TIME: 20.8 SECONDS (ref 11.8–14.2)

## 2018-07-20 PROCEDURE — 85610 PROTHROMBIN TIME: CPT

## 2018-07-20 PROCEDURE — 36415 COLL VENOUS BLD VENIPUNCTURE: CPT

## 2018-07-27 ENCOUNTER — APPOINTMENT (OUTPATIENT)
Dept: LAB | Facility: CLINIC | Age: 78
End: 2018-07-27
Payer: COMMERCIAL

## 2018-07-27 DIAGNOSIS — I48.0 PAROXYSMAL ATRIAL FIBRILLATION (HCC): ICD-10-CM

## 2018-07-27 LAB
INR PPP: 3.09 (ref 0.86–1.17)
LEFT EYE DIABETIC RETINOPATHY: NORMAL
PROTHROMBIN TIME: 31.9 SECONDS (ref 11.8–14.2)
RIGHT EYE DIABETIC RETINOPATHY: NORMAL
SEVERITY (EYE EXAM): NORMAL

## 2018-07-27 PROCEDURE — 85610 PROTHROMBIN TIME: CPT

## 2018-07-27 PROCEDURE — 36415 COLL VENOUS BLD VENIPUNCTURE: CPT

## 2018-08-06 ENCOUNTER — APPOINTMENT (OUTPATIENT)
Dept: LAB | Facility: CLINIC | Age: 78
End: 2018-08-06
Payer: COMMERCIAL

## 2018-08-06 DIAGNOSIS — I48.0 PAROXYSMAL ATRIAL FIBRILLATION (HCC): ICD-10-CM

## 2018-08-06 LAB
INR PPP: 2.68 (ref 0.86–1.17)
PROTHROMBIN TIME: 28.6 SECONDS (ref 11.8–14.2)

## 2018-08-06 PROCEDURE — 36415 COLL VENOUS BLD VENIPUNCTURE: CPT

## 2018-08-06 PROCEDURE — 85610 PROTHROMBIN TIME: CPT

## 2018-08-20 ENCOUNTER — APPOINTMENT (OUTPATIENT)
Dept: LAB | Facility: CLINIC | Age: 78
End: 2018-08-20
Payer: COMMERCIAL

## 2018-08-20 DIAGNOSIS — I48.0 PAROXYSMAL ATRIAL FIBRILLATION (HCC): ICD-10-CM

## 2018-08-20 LAB
INR PPP: 2.54 (ref 0.86–1.17)
PROTHROMBIN TIME: 27.4 SECONDS (ref 11.8–14.2)

## 2018-08-20 PROCEDURE — 85610 PROTHROMBIN TIME: CPT

## 2018-08-20 PROCEDURE — 36415 COLL VENOUS BLD VENIPUNCTURE: CPT

## 2018-09-01 DIAGNOSIS — N18.30 CHRONIC KIDNEY DISEASE, STAGE III (MODERATE) (HCC): ICD-10-CM

## 2018-09-05 DIAGNOSIS — E11.8 TYPE 2 DIABETES MELLITUS WITH COMPLICATION, WITHOUT LONG-TERM CURRENT USE OF INSULIN (HCC): ICD-10-CM

## 2018-09-17 ENCOUNTER — APPOINTMENT (OUTPATIENT)
Dept: LAB | Facility: CLINIC | Age: 78
End: 2018-09-17
Payer: COMMERCIAL

## 2018-09-17 DIAGNOSIS — I48.0 PAROXYSMAL ATRIAL FIBRILLATION (HCC): ICD-10-CM

## 2018-09-17 DIAGNOSIS — N18.30 CHRONIC KIDNEY DISEASE, STAGE III (MODERATE) (HCC): ICD-10-CM

## 2018-09-17 LAB
ALBUMIN SERPL BCP-MCNC: 3.4 G/DL (ref 3.5–5)
ANION GAP SERPL CALCULATED.3IONS-SCNC: 5 MMOL/L (ref 4–13)
BASOPHILS # BLD AUTO: 0.04 THOUSANDS/ΜL (ref 0–0.1)
BASOPHILS NFR BLD AUTO: 1 % (ref 0–1)
BUN SERPL-MCNC: 26 MG/DL (ref 5–25)
CALCIUM SERPL-MCNC: 9.6 MG/DL (ref 8.3–10.1)
CHLORIDE SERPL-SCNC: 106 MMOL/L (ref 100–108)
CO2 SERPL-SCNC: 29 MMOL/L (ref 21–32)
CREAT SERPL-MCNC: 1.4 MG/DL (ref 0.6–1.3)
CREAT UR-MCNC: 100 MG/DL
EOSINOPHIL # BLD AUTO: 0.14 THOUSAND/ΜL (ref 0–0.61)
EOSINOPHIL NFR BLD AUTO: 2 % (ref 0–6)
ERYTHROCYTE [DISTWIDTH] IN BLOOD BY AUTOMATED COUNT: 14.4 % (ref 11.6–15.1)
GFR SERPL CREATININE-BSD FRML MDRD: 48 ML/MIN/1.73SQ M
GLUCOSE P FAST SERPL-MCNC: 101 MG/DL (ref 65–99)
HCT VFR BLD AUTO: 45.9 % (ref 36.5–49.3)
HGB BLD-MCNC: 15 G/DL (ref 12–17)
IMM GRANULOCYTES # BLD AUTO: 0.03 THOUSAND/UL (ref 0–0.2)
IMM GRANULOCYTES NFR BLD AUTO: 0 % (ref 0–2)
INR PPP: 2.08 (ref 0.86–1.17)
LYMPHOCYTES # BLD AUTO: 1.88 THOUSANDS/ΜL (ref 0.6–4.47)
LYMPHOCYTES NFR BLD AUTO: 24 % (ref 14–44)
MCH RBC QN AUTO: 27.6 PG (ref 26.8–34.3)
MCHC RBC AUTO-ENTMCNC: 32.7 G/DL (ref 31.4–37.4)
MCV RBC AUTO: 85 FL (ref 82–98)
MONOCYTES # BLD AUTO: 0.71 THOUSAND/ΜL (ref 0.17–1.22)
MONOCYTES NFR BLD AUTO: 9 % (ref 4–12)
NEUTROPHILS # BLD AUTO: 4.91 THOUSANDS/ΜL (ref 1.85–7.62)
NEUTS SEG NFR BLD AUTO: 64 % (ref 43–75)
NRBC BLD AUTO-RTO: 0 /100 WBCS
PHOSPHATE SERPL-MCNC: 2.6 MG/DL (ref 2.3–4.1)
PLATELET # BLD AUTO: 250 THOUSANDS/UL (ref 149–390)
PMV BLD AUTO: 10.7 FL (ref 8.9–12.7)
POTASSIUM SERPL-SCNC: 3.8 MMOL/L (ref 3.5–5.3)
PROT UR-MCNC: 64 MG/DL
PROT/CREAT UR: 0.64 MG/G{CREAT} (ref 0–0.1)
PROTHROMBIN TIME: 23.5 SECONDS (ref 11.8–14.2)
PTH-INTACT SERPL-MCNC: 29.5 PG/ML (ref 18.4–80.1)
RBC # BLD AUTO: 5.43 MILLION/UL (ref 3.88–5.62)
SODIUM SERPL-SCNC: 140 MMOL/L (ref 136–145)
WBC # BLD AUTO: 7.71 THOUSAND/UL (ref 4.31–10.16)

## 2018-09-17 PROCEDURE — 36415 COLL VENOUS BLD VENIPUNCTURE: CPT

## 2018-09-17 PROCEDURE — 84156 ASSAY OF PROTEIN URINE: CPT

## 2018-09-17 PROCEDURE — 85610 PROTHROMBIN TIME: CPT

## 2018-09-17 PROCEDURE — 83970 ASSAY OF PARATHORMONE: CPT

## 2018-09-17 PROCEDURE — 82570 ASSAY OF URINE CREATININE: CPT

## 2018-09-17 PROCEDURE — 80069 RENAL FUNCTION PANEL: CPT

## 2018-09-17 PROCEDURE — 85025 COMPLETE CBC W/AUTO DIFF WBC: CPT

## 2018-10-04 ENCOUNTER — OFFICE VISIT (OUTPATIENT)
Dept: NEPHROLOGY | Facility: CLINIC | Age: 78
End: 2018-10-04
Payer: COMMERCIAL

## 2018-10-04 VITALS
HEART RATE: 70 BPM | BODY MASS INDEX: 30.7 KG/M2 | DIASTOLIC BLOOD PRESSURE: 78 MMHG | WEIGHT: 191 LBS | SYSTOLIC BLOOD PRESSURE: 129 MMHG | HEIGHT: 66 IN

## 2018-10-04 DIAGNOSIS — I63.9 CEREBROVASCULAR ACCIDENT (CVA), UNSPECIFIED MECHANISM (HCC): ICD-10-CM

## 2018-10-04 DIAGNOSIS — Z79.4 TYPE 2 DIABETES MELLITUS WITH STAGE 3 CHRONIC KIDNEY DISEASE, WITH LONG-TERM CURRENT USE OF INSULIN (HCC): ICD-10-CM

## 2018-10-04 DIAGNOSIS — N20.0 NEPHROLITHIASIS: ICD-10-CM

## 2018-10-04 DIAGNOSIS — N18.30 TYPE 2 DIABETES MELLITUS WITH STAGE 3 CHRONIC KIDNEY DISEASE, WITH LONG-TERM CURRENT USE OF INSULIN (HCC): ICD-10-CM

## 2018-10-04 DIAGNOSIS — I10 BENIGN ESSENTIAL HYPERTENSION: ICD-10-CM

## 2018-10-04 DIAGNOSIS — E11.22 TYPE 2 DIABETES MELLITUS WITH STAGE 3 CHRONIC KIDNEY DISEASE, WITH LONG-TERM CURRENT USE OF INSULIN (HCC): ICD-10-CM

## 2018-10-04 DIAGNOSIS — N18.30 CHRONIC KIDNEY DISEASE, STAGE 3 (HCC): Primary | ICD-10-CM

## 2018-10-04 PROCEDURE — 99214 OFFICE O/P EST MOD 30 MIN: CPT | Performed by: INTERNAL MEDICINE

## 2018-10-04 NOTE — PROGRESS NOTES
OFFICE FOLLOW UP - Nephrology   Christopher Salgado [facility] 68 y o  male MRN: [id number]       ASSESSMENT and PLAN:  Bonifacio Bundy was seen today for follow-up  Diagnoses and all orders for this visit:    Chronic kidney disease, stage 3 ([address] )  -     CBC; Future  -     Protein / creatinine ratio, urine; Future  -     PTH, intact; Future  -     Renal function panel; Future  -     Urinalysis with microscopic; Future    Type 2 diabetes mellitus with stage 3 chronic kidney disease, with long-term current use of insulin (HCC)    Nephrolithiasis    Benign essential hypertension    Cerebrovascular accident (CVA), unspecified mechanism [facility])         29-year-old gentleman with known history of chronic kidney disease stage 3 in the setting of diabetes, hypertension, previous episode of obstructive uropathy in 2013, hyperlipidemia who returns to the office for follow-up  1  Chronic kidney disease stage 3 baseline creatinine around 1 2-1 5  Renal function fairly stable with a most recent creatinine of 1 4 on minimal proteinuria  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, hypertensive nephrosclerosis and possible atherosclerotic disease  Given that his kidney function is fairly stable I would like to see him back in the office in 1 year with repeat labs  Discussed about importance of having good blood sugar control as well as avoiding NSAIDs to a slow down the progression of his kidney disease  2  Insulin-dependent diabetes, continue management by his family doctor  Goal to have an A1c less than 7%  3  Hypertension, blood pressure acceptable, no changes on his medications at this moment  4  History of nephrolithiasis with episode of obstructive uropathy requiring bilateral percutaneous nephrostomy tubes in 2013  He has not passed any kidney stones in several years, advised to follow a low-salt diet and drink at least 2 L of water daily      Also advised about restart care with urologist for yearly follow-up  5  Mineral bone disease, PTH at range  6  Hemoglobin normal     7   Tachy-brice syndrome status post pacemaker placement, continue follow-up with [facility]ologist at [facility]     Patient Instructions   Would like to see you back in the office in 1 year with repeat labs  Avoid NSAIDs ( no ibuprofen, Motrin, Aleve, Advil, naproxen)  Okay to take Tylenol as needed for pain  Continue follow-up with her family doctor for diabetes control, goal to have an A1c less than 7%  No changes in her medication at this moment  Recommend to contact you urologist and restart his care for regular follow-up given history of kidney stones and obstructive uropathy in the past           HPI: Gaby Perdomo is a 68 y o  male who is here for Follow-up    Last time seen was in January 2018, today he returns for 9 month follow-up  Since our last visit, there has been no ER visits or hospitilizations  Currently feeling okay, denies any complaints, denies any urinary problems, no recent episode gross hematuria, denies passing any kidney stones  Appetite is stable  Patient denies any chest pain, shortness of breath and swelling  The last blood work was done on 09/17, which we have reviewed together  Denies taking any NSAIDs  Denies tobacco    ROS:   All the systems were reviewed and were negative except as documented on the HPI      Allergies: Flu virus vaccine and Haemophilus influenzae    Medications:   Current Outpatient Prescriptions:     amLODIPine (NORVASC) 5 mg tablet, TAKE 1 TABLET(5 MG) BY MOUTH DAILY, Disp: 90 tablet, Rfl: 1    atorvastatin (LIPITOR) 40 mg tablet, Take 1 tablet by mouth daily with dinner, Disp: 30 tablet, Rfl: 3    cyanocobalamin 1000 MCG tablet, Take 1 tablet by mouth daily, Disp: 30 tablet, Rfl: 3    metFORMIN (GLUCOPHAGE) 500 mg tablet, TAKE 1 TABLET BY MOUTH EVERY 12 HOURS, Disp: 60 tablet, Rfl: 5    NOVOLIN N 100 UNIT/ML subcutaneous injection, 12 units in the morning and 22 units in the evening, Disp: 10 mL, Rfl: 0    warfarin (COUMADIN) 5 mg tablet, Take by mouth, Disp: , Rfl:     loratadine (CLARITIN) 10 mg tablet, Take 0 5 tablets by mouth daily (Patient not taking: Reported on 10/4/2018 ), Disp: 15 tablet, Rfl: 3    metoprolol tartrate (LOPRESSOR) 50 mg tablet, Take 1 tablet by mouth every 12 (twelve) hours (Patient not taking: Reported on 10/4/2018 ), Disp: 60 tablet, Rfl: 3    Past Medical History:   Diagnosis Date    Arthritis     Carotid stenosis     Coronary artery disease     Last Assessed:  11/5/13    CVA (cerebral vascular accident) [facility])     Last Assessed:  1/10/18    [facility] )     Controlled with kidney complication, Last Assessed:  1/10/18    Hematuria     Hyperlipidemia     Hypertension     Kidney stone     Renal disorder     Trigger finger     Vitamin D deficiency      Past Surgical History:   Procedure Laterality Date    CARDIAC SURGERY      CIRCUMCISION      Resolved:  1964, Elective    COLONOSCOPY      Resolved:  2006    CORONARY ARTERY BYPASS GRAFT  2000 6 vesels    CYSTOSCOPY      Resolved:  4/9/10, Diagnostic    ELBOW SURGERY      FRACTURE SURGERY      left forearm fx with plate repair   HUMERUS FRACTURE SURGERY      Open, Greater tuberosity    LITHOTRIPSY  09/12/2013    Renal    NEPHROSTOMY Bilateral     With drainage    VASECTOMY       Family History   Problem Relation Age of Onset    Heart attack Mother     Coronary artery disease Mother     Emphysema Father     Cancer Brother         Leukemia    Hypertension Son     Diabetes Maternal Grandfather     Hypertension Other     Other Other         Cerebrovascular accident (CVA)      reports that he has quit smoking  His smoking use included Cigarettes  He has a 60 00 pack-year smoking history  He has never used smokeless tobacco  He reports that he does not drink alcohol or use drugs        Physical Exam:   Vitals:    10/04/18 1420 10/04/18 1435   BP:  129/78   BP Location:  Right arm   Patient Position:  Sitting   Pulse:  70   Weight: 86 6 kg (191 lb)    Height: 5' 6" (1 676 m)      Body mass index is 30 83 kg/m²      General: cooperative, in not acute distress  Eyes: conjunctivae pink, anicteric sclerae  ENT: lips and mucous membranes moist  Neck: supple, no JVD  Chest: clear breath sounds bilateral, no crackles, ronchus or wheezings  CVS: distinct S1 & S2, normal rate, regular rhythm  Abdomen: soft, non-tender, non-distended, normoactive bowel sounds  Extremities: no edema of both legs  Skin: no rash  Neuro: awake, alert, oriented      Lab Results:  Results for orders placed or performed in visit on 09/17/18   CBC and differential   Result Value Ref Range    WBC 7 71 4 31 - 10 16 Thousand/uL    RBC 5 43 3 88 - 5 62 Million/uL    Hemoglobin 15 0 12 0 - 17 0 g/dL    Hematocrit 45 9 36 5 - 49 3 %    MCV 85 82 - 98 fL    MCH 27 6 26 8 - 34 3 pg    MCHC 32 7 31 4 - 37 4 g/dL    RDW 14 4 11 6 - 15 1 %    MPV 10 7 8 9 - 12 7 fL    Platelets 809 911 - 347 Thousands/uL    nRBC 0 /100 WBCs    Neutrophils Relative 64 43 - 75 %    Immat GRANS % 0 0 - 2 %    Lymphocytes Relative 24 14 - 44 %    Monocytes Relative 9 4 - 12 %    Eosinophils Relative 2 0 - 6 %    Basophils Relative 1 0 - 1 %    Neutrophils Absolute 4 91 1 85 - 7 62 Thousands/µL    Immature Grans Absolute 0 03 0 00 - 0 20 Thousand/uL    Lymphocytes Absolute 1 88 0 60 - 4 47 Thousands/µL    Monocytes Absolute 0 71 0 17 - 1 22 Thousand/µL    Eosinophils Absolute 0 14 0 00 - 0 61 Thousand/µL    Basophils Absolute 0 04 0 00 - 0 10 Thousands/µL   PTH, intact   Result Value Ref Range    PTH 29 5 18 4 - 80 1 pg/mL   Renal function panel   Result Value Ref Range    Albumin 3 4 (L) 3 5 - 5 0 g/dL    Calcium 9 6 8 3 - 10 1 mg/dL    Phosphorus 2 6 2 3 - 4 1 mg/dL    BUN 26 (H) 5 - 25 mg/dL    Creatinine 1 40 (H) 0 60 - 1 30 mg/dL    Sodium 140 136 - 145 mmol/L    Potassium 3 8 3 5 - 5 3 mmol/L Chloride 106 100 - 108 mmol/L    CO2 29 21 - 32 mmol/L    ANION GAP 5 4 - 13 mmol/L    eGFR 48 ml/min/1 73sq m    Glucose, Fasting 101 (H) 65 - 99 mg/dL   Protein / creatinine ratio, urine   Result Value Ref Range    Creatinine, Ur 100 0 mg/dL    Protein Urine Random 64 mg/dL    Prot/Creat Ratio, Ur 0 64 (H) 0 00 - 0 10   Protime-INR   Result Value Ref Range    Protime 23 5 (H) 11 8 - 14 2 seconds    INR 2 08 (H) 0 86 - 1 17               Portions of the record may have been created with voice recognition software  Occasional wrong word or "sound a like" substitutions may have occurred due to the inherent limitations of voice recognition software  Read the chart carefully and recognize, using context, where substitutions have occurred  If you have any questions, please contact the dictating provider

## 2018-10-04 NOTE — PATIENT INSTRUCTIONS
Would like to see you back in the office in 1 year with repeat labs  Avoid NSAIDs ( no ibuprofen, Motrin, Aleve, Advil, naproxen)  Okay to take Tylenol as needed for pain  Continue follow-up with her family doctor for diabetes control, goal to have an A1c less than 7%  No changes in her medication at this moment    Recommend to contact you urologist and restart his care for regular follow-up given history of kidney stones and obstructive uropathy in the past

## 2018-10-05 ENCOUNTER — TELEPHONE (OUTPATIENT)
Dept: UROLOGY | Facility: AMBULATORY SURGERY CENTER | Age: 78
End: 2018-10-05

## 2018-10-05 NOTE — TELEPHONE ENCOUNTER
Reason for appointment/Complaint/Diagnosis :  Chronic kidney disease - pt saw kay before     Insurance: [employer] P[employer]     History of Cancer?                        If yes, what kind? Previous urologist?     Kay                   Records requested/where? Some records in [address] [place] [address] Rd     Outside testing/where? Some records in [facility]     Location Preference for office visit?  [place]

## 2018-10-15 DIAGNOSIS — I10 BENIGN ESSENTIAL HYPERTENSION: ICD-10-CM

## 2018-10-16 ENCOUNTER — APPOINTMENT (OUTPATIENT)
Dept: LAB | Facility: CLINIC | Age: 78
End: 2018-10-16
Payer: COMMERCIAL

## 2018-10-16 DIAGNOSIS — I48.0 PAROXYSMAL ATRIAL FIBRILLATION (HCC): ICD-10-CM

## 2018-10-16 LAB
INR PPP: 2.9 (ref 0.86–1.17)
PROTHROMBIN TIME: 30.4 SECONDS (ref 11.8–14.2)

## 2018-10-16 PROCEDURE — 85610 PROTHROMBIN TIME: CPT

## 2018-10-16 PROCEDURE — 36415 COLL VENOUS BLD VENIPUNCTURE: CPT

## 2018-10-16 RX ORDER — AMLODIPINE BESYLATE 5 MG/1
TABLET ORAL
Qty: 90 TABLET | Refills: 1 | Status: SHIPPED | OUTPATIENT
Start: 2018-10-16 | End: 2019-01-07 | Stop reason: SDUPTHER

## 2018-11-14 ENCOUNTER — APPOINTMENT (OUTPATIENT)
Dept: LAB | Facility: CLINIC | Age: 78
End: 2018-11-14
Payer: COMMERCIAL

## 2018-11-14 DIAGNOSIS — I48.0 PAROXYSMAL ATRIAL FIBRILLATION (HCC): ICD-10-CM

## 2018-11-14 LAB
INR PPP: 2.3 (ref 0.86–1.17)
PROTHROMBIN TIME: 25.4 SECONDS (ref 11.8–14.2)

## 2018-11-14 PROCEDURE — 85610 PROTHROMBIN TIME: CPT

## 2018-11-14 PROCEDURE — 36415 COLL VENOUS BLD VENIPUNCTURE: CPT

## 2018-11-19 ENCOUNTER — OFFICE VISIT (OUTPATIENT)
Dept: UROLOGY | Facility: AMBULATORY SURGERY CENTER | Age: 78
End: 2018-11-19
Payer: COMMERCIAL

## 2018-11-19 VITALS
HEIGHT: 66 IN | DIASTOLIC BLOOD PRESSURE: 76 MMHG | BODY MASS INDEX: 30.05 KG/M2 | WEIGHT: 187 LBS | HEART RATE: 88 BPM | SYSTOLIC BLOOD PRESSURE: 140 MMHG

## 2018-11-19 DIAGNOSIS — N20.0 NEPHROLITHIASIS: ICD-10-CM

## 2018-11-19 DIAGNOSIS — R31.29 MICROSCOPIC HEMATURIA: Primary | ICD-10-CM

## 2018-11-19 LAB
POST-VOID RESIDUAL VOLUME, ML POC: 28 ML
SL AMB  POCT GLUCOSE, UA: NORMAL
SL AMB LEUKOCYTE ESTERASE,UA: 0
SL AMB POCT BILIRUBIN,UA: NORMAL
SL AMB POCT BLOOD,UA: NORMAL
SL AMB POCT CLARITY,UA: CLEAR
SL AMB POCT COLOR,UA: YELLOW
SL AMB POCT KETONES,UA: NORMAL
SL AMB POCT NITRITE,UA: 0
SL AMB POCT PH,UA: 5
SL AMB POCT SPECIFIC GRAVITY,UA: 1.02
SL AMB POCT URINE PROTEIN: NORMAL
SL AMB POCT UROBILINOGEN: 0.2

## 2018-11-19 PROCEDURE — 51798 US URINE CAPACITY MEASURE: CPT | Performed by: UROLOGY

## 2018-11-19 PROCEDURE — 4040F PNEUMOC VAC/ADMIN/RCVD: CPT | Performed by: UROLOGY

## 2018-11-19 PROCEDURE — 81002 URINALYSIS NONAUTO W/O SCOPE: CPT | Performed by: UROLOGY

## 2018-11-19 PROCEDURE — 99204 OFFICE O/P NEW MOD 45 MIN: CPT | Performed by: UROLOGY

## 2018-11-19 NOTE — PROGRESS NOTES
11/19/2018    Dayami Wynne [facility]  1940  [id number]        Assessment  Bilateral nephrolithiasis  Microscopic hematuria    Plan  We discussed that we should do a cystoscopy to evaluate further as he has been not been checked in at least 4 years  Will check a KUB to re-evaluate his stone burden, although he is asymptomatic, and CT scan last year does not reveal concerning calculi, other than lower pole  He is comfortable observing for now if this is the case  All questions answered to satisfaction  He agrees with the plan  History of Present Illness  Faustino Cobos is a 66 y o  male former patient of mine last seen in 2014 with history of nephrolithiasis  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  At the time I was unable to cannulate the upper pole ureter but there was not significant stone burden there  He did have bilateral lower pole calculi  He underwent bilateral ureteroscopy due to bilateral obstruction after nephrostomy tubes had been placed urgently for acute renal failure  He did not follow up afterwards  He does follow with Nephrology  He was found have microscopic hematuria and referred for evaluation  He has absolutely no urinary complaints  He has nocturia x1 only few drinks a lot of water before bedtime  AUA symptom score is 1  He does have remote smoking history  He had a CVA last year and was placed on warfarin  CT scan last year revealed bilateral lower pole calculi without hydronephrosis  Bladder scan postvoid residual 28 mL  AUA SYMPTOM SCORE      Most Recent Value   AUA SYMPTOM SCORE   How often have you had a sensation of not emptying your bladder completely after you finished urinating? 0   How often have you had to urinate again less than two hours after you finished urinating? 0   How often have you found you stopped and started again several times when you urinate?  0   How often have you found it difficult to postpone urination?   0   How often have you had a weak urinary stream?  0   How often have you had to push or strain to begin urination? 0   How many times did you most typically get up to urinate from the time you went to bed at night until the time you got up in the morning? 1   Quality of Life: If you were to spend the rest of your life with your urinary condition just the way it is now, how would you feel about that?  0   AUA SYMPTOM SCORE  1          Review of Systems  Review of Systems   Constitutional: Negative  HENT: Negative  Respiratory: Negative  Cardiovascular: Negative  Gastrointestinal: Negative  Genitourinary:        As per HPI   Musculoskeletal: Negative  Skin: Negative  Neurological: Negative  Hematological: Negative  Past Medical History  Past Medical History:   Diagnosis Date    Arthritis     Carotid stenosis     Coronary artery disease     Last Assessed:  11/5/13    [facility])     Last Assessed:  1/10/18    [facility]     Controlled with kidney complication, Last Assessed:  1/10/18    Hematuria     Hyperlipidemia     Hypertension     Kidney stone     Renal disorder     Trigger finger     Vitamin D deficiency        Past Social History  Past Surgical History:   Procedure Laterality Date    CARDIAC SURGERY      CIRCUMCISION      Resolved:  1964, Elective    COLONOSCOPY      Resolved:  2006    CORONARY ARTERY BYPASS GRAFT  2000    6 vesels    CYSTOSCOPY      Resolved:  4/9/10, Diagnostic    ELBOW SURGERY      FRACTURE SURGERY      left forearm fx with plate repair      HUMERUS FRACTURE SURGERY      Open, Greater tuberosity    LITHOTRIPSY  09/12/2013    Renal    NEPHROSTOMY Bilateral     With drainage    VASECTOMY         Past Family History  Family History   Problem Relation Age of Onset    Heart attack Mother     Coronary artery disease Mother     Emphysema Father     Cancer Brother         Leukemia    Hypertension Son    Patricia Pall Diabetes Maternal Grandfather     Hypertension Other     Other Other         Cerebrovascular accident (CVA)       Past Social history  Social History     Social History    Marital status:      Spouse name: N/A    Number of children: N/A    Years of education: N/A     Occupational History    Not on file  Social History Main Topics    Smoking status: Former Smoker     Packs/day: 1 50     Years: 40 00     Types: Cigarettes    Smokeless tobacco: Never Used      Comment: quit 30 years ago   Alcohol use No    Drug use: No    Sexual activity: Not on file     Other Topics Concern    Not on file     Social History Narrative    Daily caffeine consumption, 1 serving a day     History   Smoking Status    Former Smoker    Packs/day: 1 50    Years: 40 00    Types: Cigarettes   Smokeless Tobacco    Never Used     Comment: quit 30 years ago  Current Medications  Current Outpatient Prescriptions   Medication Sig Dispense Refill    amLODIPine (NORVASC) 5 mg tablet TAKE 1 TABLET(5 MG) BY MOUTH DAILY 90 tablet 1    atorvastatin (LIPITOR) 40 mg tablet Take 1 tablet by mouth daily with dinner 30 tablet 3    cyanocobalamin 1000 MCG tablet Take 1 tablet by mouth daily 30 tablet 3    metFORMIN (GLUCOPHAGE) 500 mg tablet TAKE 1 TABLET BY MOUTH EVERY 12 HOURS 60 tablet 5    metoprolol tartrate (LOPRESSOR) 50 mg tablet Take 1 tablet by mouth every 12 (twelve) hours 60 tablet 3    NOVOLIN N 100 UNIT/ML subcutaneous injection 12 units in the morning and 22 units in the evening 10 mL 0    warfarin (COUMADIN) 5 mg tablet Take by mouth      loratadine (CLARITIN) 10 mg tablet Take 0 5 tablets by mouth daily (Patient not taking: Reported on 10/4/2018 ) 15 tablet 3     No current facility-administered medications for this visit          Allergies  Allergies   Allergen Reactions    Flu Virus Vaccine Rash    Haemophilus Influenzae Rash       Past Medical History, Social History, Family History, medications and allergies were reviewed  Vitals  Vitals:    11/19/18 1400   BP: 140/76   Pulse: 88   Weight: 84 8 kg (187 lb)   Height: 5' 6" (1 676 m)       Physical Exam  Physical Exam   Constitutional: He is oriented to person, place, and time  He appears well-developed and well-nourished  Cardiovascular: Normal rate  Pulmonary/Chest: Effort normal    Abdominal: Soft  Genitourinary:   Genitourinary Comments: No CVA tenderness  Musculoskeletal:   Walking with a walker  Neurological: He is alert and oriented to person, place, and time  Skin: Skin is warm, dry and intact  Psychiatric: He has a normal mood and affect  Vitals reviewed          Results  No results found for: PSA  Lab Results   Component Value Date    GLUCOSE 156 (H) 12/31/2015    CALCIUM 9 6 09/17/2018     12/31/2015    K 3 8 09/17/2018    CO2 29 09/17/2018     09/17/2018    BUN 26 (H) 09/17/2018    CREATININE 1 40 (H) 09/17/2018     Lab Results   Component Value Date    WBC 7 71 09/17/2018    HGB 15 0 09/17/2018    HCT 45 9 09/17/2018    MCV 85 09/17/2018     09/17/2018

## 2018-11-19 NOTE — LETTER
November 19, 2018     David Adam, [address]    Patient: Brendan Cobos   YOB: 1940   Date of Visit: 11/19/2018       Dear Dr Renetta Meyer: Thank you for referring Tennis Halsted to me for evaluation  Below are my notes for this consultation  If you have questions, please do not hesitate to call me  I look forward to following your patient along with you  Sincerely,        Bri Sanchez MD        CC: No Recipients  Bri Sanchez MD  11/19/2018  2:44 PM  Sign at close encounter  11/19/2018    [facility]ylie [facility]  1940  [id number]        Assessment  Bilateral nephrolithiasis  Microscopic hematuria    Plan  We discussed that we should do a cystoscopy to evaluate further as he has been not been checked in at least 4 years  Will check a KUB to re-evaluate his stone burden, although he is asymptomatic, and CT scan last year does not reveal concerning calculi, other than lower pole  He is comfortable observing for now if this is the case  All questions answered to satisfaction  He agrees with the plan  History of Present Illness  Yoni Davila is a 66 y o  male former patient of mine last seen in 2014 with history of nephrolithiasis  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  At the time I was unable to cannulate the upper pole ureter but there was not significant stone burden there  He did have bilateral lower pole calculi  He underwent bilateral ureteroscopy due to bilateral obstruction after nephrostomy tubes had been placed urgently for acute renal failure  He did not follow up afterwards  He does follow with Nephrology  He was found have microscopic hematuria and referred for evaluation  He has absolutely no urinary complaints  He has nocturia x1 only few drinks a lot of water before bedtime  AUA symptom score is 1  He does have remote smoking history  He had a CVA last year and was placed on warfarin    CT scan last year revealed bilateral lower pole calculi without hydronephrosis  Bladder scan postvoid residual 28 mL  AUA SYMPTOM SCORE      Most Recent Value   AUA SYMPTOM SCORE   How often have you had a sensation of not emptying your bladder completely after you finished urinating? 0   How often have you had to urinate again less than two hours after you finished urinating? 0   How often have you found you stopped and started again several times when you urinate?  0   How often have you found it difficult to postpone urination? 0   How often have you had a weak urinary stream?  0   How often have you had to push or strain to begin urination? 0   How many times did you most typically get up to urinate from the time you went to bed at night until the time you got up in the morning? 1   Quality of Life: If you were to spend the rest of your life with your urinary condition just the way it is now, how would you feel about that?  0   AUA SYMPTOM SCORE  1          Review of Systems  Review of Systems   Constitutional: Negative  HENT: Negative  Respiratory: Negative  Cardiovascular: Negative  Gastrointestinal: Negative  Genitourinary:        As per HPI   Musculoskeletal: Negative  Skin: Negative  Neurological: Negative  Hematological: Negative            Past Medical History  Past Medical History:   Diagnosis Date    Arthritis     Carotid stenosis     Coronary artery disease     Last Assessed:  11/5/13    [facility])     Last Assessed:  1/10/18    [facility]     Controlled with kidney complication, Last Assessed:  1/10/18    Hematuria     Hyperlipidemia     Hypertension     Kidney stone     Renal disorder     Trigger finger     Vitamin D deficiency        Past Social History  Past Surgical History:   Procedure Laterality Date    CARDIAC SURGERY      CIRCUMCISION      Resolved:  1964, Elective    COLONOSCOPY      Resolved:  2006    CORONARY ARTERY BYPASS GRAFT  2000    6 vesels    CYSTOSCOPY      Resolved:  4/9/10, Diagnostic    ELBOW SURGERY      FRACTURE SURGERY      left forearm fx with plate repair   HUMERUS FRACTURE SURGERY      Open, Greater tuberosity    LITHOTRIPSY  09/12/2013    Renal    NEPHROSTOMY Bilateral     With drainage    VASECTOMY         Past Family History  Family History   Problem Relation Age of Onset    Heart attack Mother     Coronary artery disease Mother     Emphysema Father     Cancer Brother         Leukemia    Hypertension Son     Diabetes Maternal Grandfather     Hypertension Other     Other Other         Cerebrovascular accident (CVA)       Past Social history  Social History     Social History    Marital status:      Spouse name: N/A    Number of children: N/A    Years of education: N/A     Occupational History    Not on file  Social History Main Topics    Smoking status: Former Smoker     Packs/day: 1 50     Years: 40 00     Types: Cigarettes    Smokeless tobacco: Never Used      Comment: quit 30 years ago   Alcohol use No    Drug use: No    Sexual activity: Not on file     Other Topics Concern    Not on file     Social History Narrative    Daily caffeine consumption, 1 serving a day     History   Smoking Status    Former Smoker    Packs/day: 1 50    Years: 40 00    Types: Cigarettes   Smokeless Tobacco    Never Used     Comment: quit 30 years ago         Current Medications  Current Outpatient Prescriptions   Medication Sig Dispense Refill    amLODIPine (NORVASC) 5 mg tablet TAKE 1 TABLET(5 MG) BY MOUTH DAILY 90 tablet 1    atorvastatin (LIPITOR) 40 mg tablet Take 1 tablet by mouth daily with dinner 30 tablet 3    cyanocobalamin 1000 MCG tablet Take 1 tablet by mouth daily 30 tablet 3    metFORMIN (GLUCOPHAGE) 500 mg tablet TAKE 1 TABLET BY MOUTH EVERY 12 HOURS 60 tablet 5    metoprolol tartrate (LOPRESSOR) 50 mg tablet Take 1 tablet by mouth every 12 (twelve) hours 60 tablet 3    NOVOLIN N 100 UNIT/ML subcutaneous injection 12 units in the morning and 22 units in the evening 10 mL 0    warfarin (COUMADIN) 5 mg tablet Take by mouth      loratadine (CLARITIN) 10 mg tablet Take 0 5 tablets by mouth daily (Patient not taking: Reported on 10/4/2018 ) 15 tablet 3     No current facility-administered medications for this visit  Allergies  Allergies   Allergen Reactions    Flu Virus Vaccine Rash    Haemophilus Influenzae Rash       Past Medical History, Social History, Family History, medications and allergies were reviewed  Vitals  Vitals:    11/19/18 1400   BP: 140/76   Pulse: 88   Weight: 84 8 kg (187 lb)   Height: 5' 6" (1 676 m)       Physical Exam  Physical Exam   Constitutional: He is oriented to person, place, and time  He appears well-developed and well-nourished  Cardiovascular: Normal rate  Pulmonary/Chest: Effort normal    Abdominal: Soft  Genitourinary:   Genitourinary Comments: No CVA tenderness  Musculoskeletal:   Walking with a walker  Neurological: He is alert and oriented to person, place, and time  Skin: Skin is warm, dry and intact  Psychiatric: He has a normal mood and affect  Vitals reviewed          Results  No results found for: PSA  Lab Results   Component Value Date    GLUCOSE 156 (H) 12/31/2015    CALCIUM 9 6 09/17/2018     12/31/2015    K 3 8 09/17/2018    CO2 29 09/17/2018     09/17/2018    BUN 26 (H) 09/17/2018    CREATININE 1 40 (H) 09/17/2018     Lab Results   Component Value Date    WBC 7 71 09/17/2018    HGB 15 0 09/17/2018    HCT 45 9 09/17/2018    MCV 85 09/17/2018     09/17/2018

## 2018-12-10 ENCOUNTER — TRANSCRIBE ORDERS (OUTPATIENT)
Dept: RADIOLOGY | Facility: HOSPITAL | Age: 78
End: 2018-12-10

## 2018-12-10 ENCOUNTER — HOSPITAL ENCOUNTER (OUTPATIENT)
Dept: RADIOLOGY | Facility: HOSPITAL | Age: 78
Discharge: HOME | End: 2018-12-10
Attending: UROLOGY
Payer: COMMERCIAL

## 2018-12-10 DIAGNOSIS — N20.0 NEPHROLITHIASIS: ICD-10-CM

## 2018-12-10 PROCEDURE — 74018 RADEX ABDOMEN 1 VIEW: CPT

## 2018-12-14 ENCOUNTER — PROCEDURE VISIT (OUTPATIENT)
Dept: UROLOGY | Facility: AMBULATORY SURGERY CENTER | Age: 78
End: 2018-12-14
Payer: COMMERCIAL

## 2018-12-14 VITALS
WEIGHT: 188.8 LBS | SYSTOLIC BLOOD PRESSURE: 128 MMHG | BODY MASS INDEX: 30.34 KG/M2 | HEIGHT: 66 IN | DIASTOLIC BLOOD PRESSURE: 60 MMHG | HEART RATE: 76 BPM

## 2018-12-14 DIAGNOSIS — R31.29 MICROHEMATURIA: ICD-10-CM

## 2018-12-14 DIAGNOSIS — N20.0 CALCULUS OF KIDNEY: Primary | ICD-10-CM

## 2018-12-14 LAB
SL AMB  POCT GLUCOSE, UA: 100
SL AMB LEUKOCYTE ESTERASE,UA: ABNORMAL
SL AMB POCT BILIRUBIN,UA: ABNORMAL
SL AMB POCT BLOOD,UA: ABNORMAL
SL AMB POCT CLARITY,UA: CLEAR
SL AMB POCT COLOR,UA: YELLOW
SL AMB POCT KETONES,UA: ABNORMAL
SL AMB POCT NITRITE,UA: ABNORMAL
SL AMB POCT PH,UA: 6
SL AMB POCT SPECIFIC GRAVITY,UA: 1.01
SL AMB POCT URINE PROTEIN: 100
SL AMB POCT UROBILINOGEN: ABNORMAL

## 2018-12-14 PROCEDURE — 52000 CYSTOURETHROSCOPY: CPT | Performed by: UROLOGY

## 2018-12-14 PROCEDURE — 81002 URINALYSIS NONAUTO W/O SCOPE: CPT | Performed by: UROLOGY

## 2018-12-14 RX ORDER — ATORVASTATIN CALCIUM 20 MG/1
20 TABLET, FILM COATED ORAL DAILY
COMMUNITY
End: 2019-01-07 | Stop reason: SDUPTHER

## 2018-12-14 NOTE — LETTER
December 14, 2018     [address]    Patient: Amna Gaviria   YOB: 1940   Date of Visit: 12/14/2018       Dear Dr Karsten Jones: Thank you for referring David Guerin to me for evaluation  Below are my notes for this consultation  If you have questions, please do not hesitate to call me  I look forward to following your patient along with you  Sincerely,        Filemon Burton MD        CC: No Recipients  Filemon Burton MD  12/14/2018 10:09 AM  Sign at close encounter  Patient has a prior history of nephrolithiasis and duplicated right kidney  He was referred back after being lost to follow-up for 4 years due to microscopic hematuria  KUB was performed earlier this week without radiology reading yet  It appears that he has stable lower pole calculi as compared to CT scan last year  He may also have an left mid pole calculus as well  Cystoscopy  Date/Time: 12/14/2018 10:08 AM  Performed by: Jason Wright  Authorized by: Jason Wright     Additional Procedure Details: Flexible cystoscopy note     The patient was prepped and draped in sterile fashion  2% lidocaine jelly was instilled per urethra   The 16 [place] flexible cystoscope was placed per urethra  There is no urethral or prostate abnormality  Prostate is not obstructing  Evaluation of the bladder reveals bilateral normal ureteral orifices  There is some punctate calcific debris   Lidia Honor is no evidence of urothelial carcinoma or suspicious lesion   The scope was removed and the procedure was terminated  The patient tolerated the procedure well  Plan  Patient was reassured that there is no abnormality identified thus far  He is comfortable with observing his chronic nephrolithiasis  He is not interested in intervention  He will follow up in 1 year with KUB, or sooner if he develops symptoms

## 2018-12-14 NOTE — PROGRESS NOTES
Patient has a prior history of nephrolithiasis and duplicated right kidney  He was referred back after being lost to follow-up for 4 years due to microscopic hematuria  KUB was performed earlier this week without radiology reading yet  It appears that he has stable lower pole calculi as compared to CT scan last year  He may also have an left mid pole calculus as well  Cystoscopy  Date/Time: 12/14/2018 10:08 AM  Performed by: Margie Huff  Authorized by: Margie Huff     Additional Procedure Details: Flexible cystoscopy note     The patient was prepped and draped in sterile fashion  2% lidocaine jelly was instilled per urethra   The 16 [place] flexible cystoscope was placed per urethra  There is no urethral or prostate abnormality  Prostate is not obstructing  Evaluation of the bladder reveals bilateral normal ureteral orifices  There is some punctate calcific debris   Di Has is no evidence of urothelial carcinoma or suspicious lesion   The scope was removed and the procedure was terminated  The patient tolerated the procedure well  Plan  Patient was reassured that there is no abnormality identified thus far  He is comfortable with observing his chronic nephrolithiasis  He is not interested in intervention  He will follow up in 1 year with KUB, or sooner if he develops symptoms

## 2019-01-07 DIAGNOSIS — E78.2 MIXED HYPERLIPIDEMIA: Primary | ICD-10-CM

## 2019-01-07 DIAGNOSIS — I10 BENIGN ESSENTIAL HYPERTENSION: ICD-10-CM

## 2019-01-07 DIAGNOSIS — E11.8 TYPE 2 DIABETES MELLITUS WITH COMPLICATION, WITHOUT LONG-TERM CURRENT USE OF INSULIN (HCC): ICD-10-CM

## 2019-01-07 RX ORDER — AMLODIPINE BESYLATE 5 MG/1
5 TABLET ORAL DAILY
Qty: 90 TABLET | Refills: 1 | Status: SHIPPED | OUTPATIENT
Start: 2019-01-07 | End: 2019-06-05 | Stop reason: SDUPTHER

## 2019-01-07 RX ORDER — ATORVASTATIN CALCIUM 20 MG/1
20 TABLET, FILM COATED ORAL DAILY
Qty: 90 TABLET | Refills: 1 | Status: SHIPPED | OUTPATIENT
Start: 2019-01-07 | End: 2019-06-14 | Stop reason: SDUPTHER

## 2019-01-07 NOTE — TELEPHONE ENCOUNTER
Patient changed pharmacies, uses [facility] [address] now     Scripts needs to be sent for Atorvastatin 20 mg 1 daily,      Metformin 500 mg   2 daily     Amlodipine 5 mg 1 daily              All for 90 day supply

## 2019-01-14 ENCOUNTER — OFFICE VISIT (OUTPATIENT)
Dept: OBGYN CLINIC | Facility: CLINIC | Age: 79
End: 2019-01-14
Payer: COMMERCIAL

## 2019-01-14 ENCOUNTER — APPOINTMENT (OUTPATIENT)
Dept: RADIOLOGY | Facility: CLINIC | Age: 79
End: 2019-01-14
Payer: COMMERCIAL

## 2019-01-14 VITALS
HEART RATE: 94 BPM | DIASTOLIC BLOOD PRESSURE: 88 MMHG | BODY MASS INDEX: 29.86 KG/M2 | SYSTOLIC BLOOD PRESSURE: 196 MMHG | WEIGHT: 185.8 LBS | HEIGHT: 66 IN

## 2019-01-14 DIAGNOSIS — M75.82 ROTATOR CUFF TENDINITIS, LEFT: ICD-10-CM

## 2019-01-14 DIAGNOSIS — M25.522 PAIN IN LEFT ELBOW: ICD-10-CM

## 2019-01-14 DIAGNOSIS — R52 PAIN: Primary | ICD-10-CM

## 2019-01-14 DIAGNOSIS — R52 PAIN: ICD-10-CM

## 2019-01-14 PROCEDURE — 20610 DRAIN/INJ JOINT/BURSA W/O US: CPT | Performed by: ORTHOPAEDIC SURGERY

## 2019-01-14 PROCEDURE — 73080 X-RAY EXAM OF ELBOW: CPT

## 2019-01-14 PROCEDURE — 99203 OFFICE O/P NEW LOW 30 MIN: CPT | Performed by: ORTHOPAEDIC SURGERY

## 2019-01-14 PROCEDURE — 73060 X-RAY EXAM OF HUMERUS: CPT

## 2019-01-14 PROCEDURE — 73030 X-RAY EXAM OF SHOULDER: CPT

## 2019-01-14 RX ORDER — LIDOCAINE HYDROCHLORIDE 10 MG/ML
2 INJECTION, SOLUTION INFILTRATION; PERINEURAL
Status: COMPLETED | OUTPATIENT
Start: 2019-01-14 | End: 2019-01-14

## 2019-01-14 RX ORDER — BETAMETHASONE SODIUM PHOSPHATE AND BETAMETHASONE ACETATE 3; 3 MG/ML; MG/ML
6 INJECTION, SUSPENSION INTRA-ARTICULAR; INTRALESIONAL; INTRAMUSCULAR; SOFT TISSUE
Status: COMPLETED | OUTPATIENT
Start: 2019-01-14 | End: 2019-01-14

## 2019-01-14 RX ADMIN — LIDOCAINE HYDROCHLORIDE 2 ML: 10 INJECTION, SOLUTION INFILTRATION; PERINEURAL at 11:48

## 2019-01-14 RX ADMIN — BETAMETHASONE SODIUM PHOSPHATE AND BETAMETHASONE ACETATE 6 MG: 3; 3 INJECTION, SUSPENSION INTRA-ARTICULAR; INTRALESIONAL; INTRAMUSCULAR; SOFT TISSUE at 11:48

## 2019-01-14 NOTE — PROGRESS NOTES
ASSESSMENT/PLAN:    Assessment:   Left elbow post-traumatic arthritis  Left rotator cuff tendinitis    Plan:   WBAT b/l UE  S/p L shoulder subacromial space injection today, tolerated well  Referral to PT for left shoulder RTC strengthening, shoulder ROM    To Do Next Visit:  Patient may follow-up if he continues to have symptoms  If majority of pain is felt to be generating from left shoulder, will consider referral back to Dr Bijan Francis  Otherwise patient may be candidate for removal of hardware left elbow as this may be causing symptomatology for him     _____________________________________________________  CHIEF COMPLAINT:  Chief Complaint   Patient presents with    Left Upper Arm - Pain         SUBJECTIVE:  Cameron Moon is a 66y o  year old male who presents with left elbow pain which is present every day all day  He has a history of a left elbow fracture which underwent open reduction internal fixation about 10 years ago  He complains of limited range of motion of his left elbow but denies any motor sensory deficits  He is a diabetic but has no neuropathy at baseline  Denies any pain in the shoulder or distally  He is right-hand dominant  He is a nonsmoker      PAST MEDICAL HISTORY:  Past Medical History:   Diagnosis Date    Arthritis     Carotid stenosis     Coronary artery disease     Last Assessed:  11/5/13    CVA [facility]re[facility])     Last Assessed:  1/10/18    Diabetes mellitus ([facility] [address] )     Controlled with kidney complication, Last Assessed:  1/10/18    Hematuria     Hyperlipidemia     Hypertension     Kidney stone     Renal disorder     Trigger finger     Vitamin D deficiency        PAST SURGICAL HISTORY:  Past Surgical History:   Procedure Laterality Date    CARDIAC SURGERY      CIRCUMCISION      Resolved:  1964, Elective    COLONOSCOPY      Resolved:  2006    CORONARY ARTERY BYPASS GRAFT  2000    6 vesels    CYSTOSCOPY      Resolved:  4/9/10, Diagnostic    CYSTOSCOPY  12/14/2018    Dr Scott Cancel      left forearm fx with plate repair   HUMERUS FRACTURE SURGERY      Open, Greater tuberosity    LITHOTRIPSY  09/12/2013    Renal    NEPHROSTOMY Bilateral     With drainage    VASECTOMY         FAMILY HISTORY:  Family History   Problem Relation Age of Onset    Heart attack Mother     Coronary artery disease Mother     Emphysema Father     Cancer Brother         Leukemia    Hypertension Son     Diabetes Maternal Grandfather     Hypertension Other     Other Other         Cerebrovascular accident (CVA)       SOCIAL HISTORY:  Social History   Substance Use Topics    Smoking status: Former Smoker     Packs/day: 1 50     Years: 40 00     Types: Cigarettes    Smokeless tobacco: Never Used      Comment: quit 30 years ago      Alcohol use No       MEDICATIONS:    Current Outpatient Prescriptions:     amLODIPine (NORVASC) 5 mg tablet, Take 1 tablet (5 mg total) by mouth daily for 90 days, Disp: 90 tablet, Rfl: 1    atorvastatin (LIPITOR) 20 mg tablet, Take 1 tablet (20 mg total) by mouth daily, Disp: 90 tablet, Rfl: 1    cyanocobalamin 1000 MCG tablet, Take 1 tablet by mouth daily, Disp: 30 tablet, Rfl: 3    loratadine (CLARITIN) 10 mg tablet, Take 0 5 tablets by mouth daily, Disp: 15 tablet, Rfl: 3    metFORMIN (GLUCOPHAGE) 500 mg tablet, Take 1 tablet (500 mg total) by mouth every 12 (twelve) hours for 30 days, Disp: 60 tablet, Rfl: 5    NOVOLIN N 100 UNIT/ML subcutaneous injection, 12 units in the morning and 22 units in the evening, Disp: 10 mL, Rfl: 0    warfarin (COUMADIN) 5 mg tablet, Take by mouth, Disp: , Rfl:     metoprolol tartrate (LOPRESSOR) 50 mg tablet, Take 1 tablet by mouth every 12 (twelve) hours (Patient not taking: Reported on 1/14/2019 ), Disp: 60 tablet, Rfl: 3    ALLERGIES:  Allergies   Allergen Reactions    Flu Virus Vaccine Rash    Haemophilus Influenzae Rash       REVIEW OF SYSTEMS:  Pertinent items are noted in HPI  A comprehensive review of systems was negative  LABS:  HgA1c:   Lab Results   Component Value Date    HGBA1C 7 4 (H) 04/26/2018     BMP:   Lab Results   Component Value Date    GLUCOSE 156 (H) 12/31/2015    CALCIUM 9 6 09/17/2018     12/31/2015    K 3 8 09/17/2018    CO2 29 09/17/2018     09/17/2018    BUN 26 (H) 09/17/2018    CREATININE 1 40 (H) 09/17/2018       _____________________________________________________  PHYSICAL EXAMINATION:  General: well developed and well nourished, alert, oriented times 3 and appears comfortable  Psychiatric: Normal  HEENT: Trachea Midline, No torticollis  Cardiovascular: No discernable arrhythmia  Pulmonary: No wheezing or stridor  Skin: {well-healed posterior incision left elbow with no evidence of infection; otherwise no open lesions or rashes  Neurovascular: Sensation Intact to the Median, Ulnar, Radial Nerve, Motor Intact to the Median, Ulnar, Radial Nerve and Pulses Intact    MUSCULOSKELETAL EXAMINATION:  Extremities:  Left upper extremity  AROM ; supination to 30; full pronation  Crepitus felt through elbow ROM  LUE warm/well perfused  Hangar test negative but pain present  Distal biceps insertion without tenderness  No tenderness over lateral epicondyle/ECRL/ECRB   Some tenderness over LHBT  Mildly positive Hawkin's test  Pain with resisted shoulder abduction  4/5 resisted shoulder ER strength, 5/5 subscap strength      _____________________________________________________  STUDIES REVIEWED:  I have personally reviewed the films and plain films of left shoulder reveal generalized arthritic changes in the glenohumeral and AC joint  I personally reviewed the left elbow films and they reveal:   Posttraumatic ulnohumeral and radiocapitellar arthritis  The posterior migration of K-wire, breakage of 1 K-wire distally in the proximal radius        PROCEDURES PERFORMED:  Large joint arthrocentesis  Date/Time: 1/14/2019 11:48 AM  Consent given by: patient  Supporting Documentation  Indications: pain   Procedure Details  Location: shoulder - L subacromial bursa  Needle size: 22 G  Approach: lateral  Medications administered: 2 mL lidocaine 1 %; 6 mg betamethasone acetate-betamethasone sodium phosphate 6 (3-3) mg/mL    Dressing:  Sterile dressing applied          Darren Locke  01/14/19      I interviewed, took the history and examined the patient  I discuss the case with the resident and reviewed the resident's note  I supervised the resident and I agree with the resident management plan as it was presented to me  I was present in the clinic and examined the patient

## 2019-01-17 ENCOUNTER — TRANSCRIBE ORDERS (OUTPATIENT)
Dept: RADIOLOGY | Facility: HOSPITAL | Age: 79
End: 2019-01-17

## 2019-01-17 DIAGNOSIS — R91.8 MASS OF LEFT LUNG: Primary | ICD-10-CM

## 2019-01-21 ENCOUNTER — EVALUATION (OUTPATIENT)
Dept: PHYSICAL THERAPY | Facility: REHABILITATION | Age: 79
End: 2019-01-21
Payer: COMMERCIAL

## 2019-01-21 DIAGNOSIS — M75.82 ROTATOR CUFF TENDINITIS, LEFT: Primary | ICD-10-CM

## 2019-01-21 PROCEDURE — 97110 THERAPEUTIC EXERCISES: CPT | Performed by: PHYSICAL THERAPIST

## 2019-01-21 PROCEDURE — G8990 OTHER PT/OT CURRENT STATUS: HCPCS | Performed by: PHYSICAL THERAPIST

## 2019-01-21 PROCEDURE — 97161 PT EVAL LOW COMPLEX 20 MIN: CPT | Performed by: PHYSICAL THERAPIST

## 2019-01-21 PROCEDURE — G8991 OTHER PT/OT GOAL STATUS: HCPCS | Performed by: PHYSICAL THERAPIST

## 2019-01-21 NOTE — PROGRESS NOTES
PT Evaluation     Today's date: 2019  Patient name: Zakiya Jacobson  : 1940  MRN: [id number]  Referring provider: Gretel Burns MD  Dx:   Encounter Diagnosis     ICD-10-CM    1  Rotator cuff tendinitis, left M75 82 Ambulatory referral to Physical Therapy                  Assessment  Assessment details: Patient presents with pain, decreased shoulder ROM, decreased shoulder strength, and decreased function secondary to Left Rotator cuff tendonitis  Patient would benefit from skilled PT intervention to address these issues and to maximize function  Thank you for the referral   Impairments: abnormal or restricted ROM, lacks appropriate home exercise program and pain with function  Understanding of Dx/Px/POC: good   Prognosis: good    Goals  Short Term:  Pt will report decreased levels of pain by at least 2 subjective ratings in 4 weeks  Pt will demonstrate improved ROM by at least 10 degrees in 4 weeks  Pt will demonstrate improved strength by 1/2 grade MMT in 4 weeks  Long Term:   Pt will be independent in their HEP in 8 weeks  Pt will demonstrate improved FOTO, > 68  Pt will be independent with all ADL's    Plan  Plan details: Pt was educated in [address] [place] [address] and [address] [place] [address]  All questions were answered to pt's satisfaction  Patient would benefit from: skilled physical therapy  Planned modality interventions: cryotherapy  Planned therapy interventions: joint mobilization, manual therapy, neuromuscular re-education, patient education, therapeutic activities, therapeutic exercise, flexibility and home exercise program  Frequency: 2x week  Duration in weeks: 8  Plan of Care beginning date: 2019  Plan of Care expiration date: 3/18/2019  Treatment plan discussed with: patient        Subjective Evaluation    History of Present Illness  Mechanism of injury: Pt is a 66 y o male with a c/o ongoing pain L shoulder region, specifically in biceps and triceps, as per pt for about 1 year of insidious onset    Pt has a history of L elbow fracture and ORIF of L forearm about 6 years ago  Pt thought this was causing his continued pain and f/u with MD   Pt had radiographs of L shoulder, humerus and elbow, which showed post-surgical changes and moderate OA of AC joint and GH joint  Pt was given an injection which provided 3 days of relief and then his pain returned  Pt is R hand dominant  Pt reports his pain is constant and not dependent on any functional activities  Pt denies any functional limitations due to this, although experienced some pain with OH AROM this session  Pain  At best pain ratin  At worst pain ratin  Location: L deltoid/shoulder  Relieving factors: rest      Diagnostic Tests  X-ray: abnormal  Treatments  Current treatment: injection treatment  Patient Goals  Patient goals for therapy: decreased pain          Objective     Tenderness     Additional Tenderness Details  Mild TTP L med deltoid    Cervical/Thoracic Screen   Cervical range of motion within normal limits with the following exceptions: Decreased mobility all planes, but no pain  (-)spurlings, (-)compression  Pt denies N/T  Active Range of Motion   Left Shoulder   Flexion: 128 degrees with pain  Abduction: 135 degrees   External rotation BTH: C2   Internal rotation BTB: T12     Passive Range of Motion   Left Shoulder   Flexion: 153 degrees with pain  Abduction: 145 degrees with pain  External rotation 90°: 65 degrees with pain  Internal rotation 90°: [employer] [facility] [email or web address]    Joint Play   Left Shoulder  Hypomobile in the posterior capsule and inferior capsule  Strength/Myotome Testing     Left Shoulder     Planes of Motion   Flexion: 4-   Abduction: 4+   External rotation at 0°: 4- (reproduced same pain pt complains of)   Internal rotation at 0°: 4-     Isolated Muscles   Biceps: 5   Triceps: 5     Tests     Left Shoulder   Positive active compression ([place]) and empty can     Negative apprehension, crossover, drop arm, external rotation lag sign, Hawkin's, lift-off, Neer's and painful arc  General Comments     Shoulder Comments   Pt denies crepitus or instability  Flowsheet Rows      Most Recent Value   PT/OT G-Codes   Current Score  68   Projected Score  68   FOTO information reviewed  Yes   Assessment Type  Evaluation   G code set  Other PT/OT Primary   Other PT Primary Current Status ()  CJ   Other PT Primary Goal Status ()  CJ          Precautions: OA, HTN, diabetes, osteoporosis, pacemaker (No e-stim), 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, CABG x6    Daily Treatment Diary     Manual              PROM L shoulder flex, ABD, ER and post/inf GH JM's                                                                       Exercise Diary              UBE             pulleys             Serratus wall slides             Scap punches             Scap ABC's             S/L ER             Bent over rows             Bent over shoulder ext             Bent over T's             TB LPD             TB rows             TB IR             TB ER             Towel circles against wall cw/ccw                                                                                               Modalities              CP PRN

## 2019-01-23 ENCOUNTER — HOSPITAL ENCOUNTER (OUTPATIENT)
Dept: RADIOLOGY | Facility: HOSPITAL | Age: 79
Discharge: HOME | End: 2019-01-23
Attending: ORTHOPAEDIC SURGERY
Payer: COMMERCIAL

## 2019-01-23 DIAGNOSIS — R91.8 MASS OF LEFT LUNG: ICD-10-CM

## 2019-01-23 PROCEDURE — 71250 CT THORAX DX C-: CPT

## 2019-01-23 PROCEDURE — 74176 CT ABD & PELVIS W/O CONTRAST: CPT

## 2019-01-25 ENCOUNTER — OFFICE VISIT (OUTPATIENT)
Dept: FAMILY MEDICINE CLINIC | Facility: CLINIC | Age: 79
End: 2019-01-25
Payer: COMMERCIAL

## 2019-01-25 VITALS
WEIGHT: 185.6 LBS | DIASTOLIC BLOOD PRESSURE: 68 MMHG | TEMPERATURE: 98.1 F | BODY MASS INDEX: 29.83 KG/M2 | RESPIRATION RATE: 20 BRPM | HEIGHT: 66 IN | HEART RATE: 76 BPM | SYSTOLIC BLOOD PRESSURE: 120 MMHG | OXYGEN SATURATION: 97 %

## 2019-01-25 DIAGNOSIS — Z79.4 TYPE 2 DIABETES MELLITUS WITH STAGE 3 CHRONIC KIDNEY DISEASE, WITH LONG-TERM CURRENT USE OF INSULIN (HCC): ICD-10-CM

## 2019-01-25 DIAGNOSIS — E11.22 TYPE 2 DIABETES MELLITUS WITH STAGE 3 CHRONIC KIDNEY DISEASE, WITH LONG-TERM CURRENT USE OF INSULIN (HCC): ICD-10-CM

## 2019-01-25 DIAGNOSIS — I10 ESSENTIAL HYPERTENSION: ICD-10-CM

## 2019-01-25 DIAGNOSIS — R91.1 LUNG NODULE: Primary | ICD-10-CM

## 2019-01-25 DIAGNOSIS — E78.5 HYPERLIPIDEMIA, UNSPECIFIED HYPERLIPIDEMIA TYPE: ICD-10-CM

## 2019-01-25 DIAGNOSIS — N18.30 TYPE 2 DIABETES MELLITUS WITH STAGE 3 CHRONIC KIDNEY DISEASE, WITH LONG-TERM CURRENT USE OF INSULIN (HCC): ICD-10-CM

## 2019-01-25 PROBLEM — Z87.442 HISTORY OF RENAL CALCULI: Status: ACTIVE | Noted: 2017-01-01

## 2019-01-25 PROBLEM — I48.91 ATRIAL FIBRILLATION (HCC): Status: ACTIVE | Noted: 2017-05-05

## 2019-01-25 PROCEDURE — 1101F PT FALLS ASSESS-DOCD LE1/YR: CPT | Performed by: FAMILY MEDICINE

## 2019-01-25 PROCEDURE — 3078F DIAST BP <80 MM HG: CPT | Performed by: FAMILY MEDICINE

## 2019-01-25 PROCEDURE — 99214 OFFICE O/P EST MOD 30 MIN: CPT | Performed by: FAMILY MEDICINE

## 2019-01-25 PROCEDURE — 3074F SYST BP LT 130 MM HG: CPT | Performed by: FAMILY MEDICINE

## 2019-01-25 NOTE — PROGRESS NOTES
Chief Complaint   Patient presents with    Follow-up     Follow up for chronic conditions  Health Maintenance   Topic Date Due    SLP PLAN OF CARE  1940    DTaP,Tdap,and Td Vaccines (1 - Tdap) 10/14/1961    Diabetic Foot Exam  11/05/2014    CRC Screening: Colonoscopy  04/14/2015    Medicare Annual Wellness Visit (AWV)  05/26/2017    DM Eye Exam  02/16/2018    INFLUENZA VACCINE  07/01/2018    PT PLAN OF CARE  02/20/2019    HEMOGLOBIN A1C  04/26/2019    URINE MICROALBUMIN  04/26/2019    Fall Risk  01/21/2020    Depression Screening PHQ  01/21/2020    Pneumococcal PPSV23/PCV13 65+ Years / High and Highest Risk  Completed     Assessment/Plan:  Lung nodule---Will call pt when we get report  DM---will review recent [place] labs  Low carb diet  Continue current metformin and novolin  HTN---controlled  Continue amlodipine, metoprolol  Hyperlipidemia---Low fat diet  Continue atorvastatin  Refused flu shot because he is allergic to it  Got PCV 13 in 2015 and pneumovax in 2016  RTO in 6 months        Diagnoses and all orders for this visit:    Lung nodule    Type 2 diabetes mellitus with stage 3 chronic kidney disease, with long-term current use of insulin (HCC)  -     Hemoglobin A1C; Future  -     Microalbumin / creatinine urine ratio; Future    Essential hypertension  -     CBC; Future  -     Comprehensive metabolic panel; Future  -     TSH, 3rd generation with Free T4 reflex; Future    Hyperlipidemia, unspecified hyperlipidemia type  -     Lipid panel; Future    Other orders  -     Cancel: Ambulatory referral to Gastroenterology; Future          Subjective:      Patient ID: Angeli Langford is a 66 y o  male  HPI  Pt is here by himself  Lung nodule----Recent left humerus xray showed 5mm left lobe lung nodule  Had CT chest on 1/23/2019  No result yet  Smoked 2ppd for 40 years  Quit 20 years ago  Denies fever, cough, wheezing, Sob, cP       DM---4/2018 hgA1C 7 4 Pt states he had labs done in [place] recently, will bring copy here  He is on meformin 500mg bid and novolin 30u am and 20u pm    Denies neuropathy  Fu opthalmology Dr Jahaira Mccain regularly  9/2018 last eye exam per pt  FU podiatry Q 3 months  Phone number [phone]    HTN----He is on metoprolol 50mg bid and amlodipine 5mg QD  We stopped lisinopril because pt feels it cause his cough  Hx of CVA in 3/2017  Right leg feels weakness  Use walker everyday  Afib---He is on coumadin  Cardiology follows PT/INR  S/p packemaker 11/2017  FU cardiology from [facility] regularly  Cardiology follows PT/INR  DWIGHT---He does not use CPAP       CKD3---FU nephrology Q 6 month  9/2018 Cr 1 4 and GFR 48     Hyperlipidemia---on atorvastatin 40mg qhs  Denies side effects       Live by himself in [facility] senior living apartment  Does all ADL's  Exercise regularly  Denies recent falls  Denies depression  The following portions of the patient's history were reviewed and updated as appropriate: allergies, current medications, past family history, past medical history, past social history, past surgical history and problem list     Review of Systems   Constitutional: Negative for appetite change, chills and fever  HENT: Negative for congestion, ear pain, sinus pain and sore throat  Eyes: Negative for discharge and itching  Respiratory: Negative for apnea, cough, chest tightness, shortness of breath and wheezing  Cardiovascular: Negative for chest pain, palpitations and leg swelling  Gastrointestinal: Negative for abdominal pain, anal bleeding, constipation, diarrhea, nausea and vomiting  Endocrine: Negative for cold intolerance, heat intolerance and polyuria  Genitourinary: Negative for difficulty urinating and dysuria  Musculoskeletal: Negative for arthralgias, back pain and myalgias  Skin: Negative for rash  Neurological: Negative for dizziness and headaches  Psychiatric/Behavioral: Negative for agitation  Objective:      /68 (BP Location: Right arm, Patient Position: Sitting, Cuff Size: Standard)   Pulse 76   Temp 98 1 °F (36 7 °C) (Oral)   Resp 20   Ht 5' 6" (1 676 m)   Wt 84 2 kg (185 lb 9 6 oz)   SpO2 97%   BMI 29 96 kg/m²          Physical Exam   Constitutional: He appears well-developed  HENT:   Head: Normocephalic and atraumatic  Eyes: Pupils are equal, round, and reactive to light  Conjunctivae are normal    Neck: Normal range of motion  Neck supple  Cardiovascular: Normal rate, regular rhythm, normal heart sounds and intact distal pulses  Pulmonary/Chest: Effort normal and breath sounds normal    Abdominal: Soft  Bowel sounds are normal    Musculoskeletal: He exhibits no edema, tenderness or deformity  Use walker   Neurological: He is alert

## 2019-01-29 ENCOUNTER — APPOINTMENT (OUTPATIENT)
Dept: PHYSICAL THERAPY | Facility: REHABILITATION | Age: 79
End: 2019-01-29
Payer: COMMERCIAL

## 2019-02-04 ENCOUNTER — OFFICE VISIT (OUTPATIENT)
Dept: PHYSICAL THERAPY | Facility: REHABILITATION | Age: 79
End: 2019-02-04
Payer: COMMERCIAL

## 2019-02-04 DIAGNOSIS — M75.82 ROTATOR CUFF TENDINITIS, LEFT: Primary | ICD-10-CM

## 2019-02-04 PROCEDURE — 97112 NEUROMUSCULAR REEDUCATION: CPT

## 2019-02-04 PROCEDURE — 97110 THERAPEUTIC EXERCISES: CPT

## 2019-02-04 NOTE — PROGRESS NOTES
Daily Note     Today's date: 2019  Patient name: Mimi Coleman  : 1940  MRN: [id number]  Referring provider: Evelyne Doss MD  Dx:   Encounter Diagnosis     ICD-10-CM    1  Rotator cuff tendinitis, left M75 82                   Subjective: Pt reports that his shoulder is a little sore and stiff but is only minimally painful  Patient treated by GURU Tobias under my direct supervision, Clemente Gomez PTA  Objective: See treatment diary below     Manual   /                     PROM L shoulder flex, ABD, ER and post/inf GH JM's  AC      TP                                                                                                                            Exercise Diary                        UBE  90 rpm 3'/3'                     pulleys 10"x 3'/3'                     Serratus wall slides  10x5"                     Scap punches  x15                     Scap ABC's  x1                     S/L ER  NV                     Bent over rows  x15                     Bent over shoulder ext  x15                     Bent over T's Breckin Patter                     TB LPD  seated OTBx15                     TB rows  seated OTB x15                     TB IR  NV                     TB ER  NV                     Towel circles against wall cw/ccw  10x5"                                                                                                                                                                           Modalities   2/4                     CP PRN  np                                                                  Assessment: Tolerated treatment well  Patient would benefit from continued PT  Notable stiffness and limitations with PROM  VC's and TC's needed for correct technique throughout initiation of session  Plan: Continue per plan of care

## 2019-02-07 ENCOUNTER — TELEPHONE (OUTPATIENT)
Dept: ADMINISTRATIVE | Facility: OTHER | Age: 79
End: 2019-02-07

## 2019-02-07 ENCOUNTER — OFFICE VISIT (OUTPATIENT)
Dept: PHYSICAL THERAPY | Facility: REHABILITATION | Age: 79
End: 2019-02-07
Payer: COMMERCIAL

## 2019-02-07 DIAGNOSIS — M75.82 ROTATOR CUFF TENDINITIS, LEFT: Primary | ICD-10-CM

## 2019-02-07 PROCEDURE — 97140 MANUAL THERAPY 1/> REGIONS: CPT

## 2019-02-07 PROCEDURE — 97112 NEUROMUSCULAR REEDUCATION: CPT

## 2019-02-07 PROCEDURE — 97110 THERAPEUTIC EXERCISES: CPT

## 2019-02-07 NOTE — PROGRESS NOTES
Daily Note     Today's date: 2019  Patient name: Zakiya Jacobson  : 1940  MRN: [id number]  Referring provider: Gretel Burns MD  Dx:   Encounter Diagnosis     ICD-10-CM    1  Rotator cuff tendinitis, left M75 82                   Subjective: Pt reports that he is feeling sore from last session  Patient treated by GURU Lozoya under my direct supervision, Juancho Thurman PTA  Objective: See treatment diary below  Manual                      PROM L shoulder flex, ABD, ER and post/inf GH JM's  AC        TP  [facility]      Np                                                                                                                         Exercise Diary                      UBE  90 rpm 3'/3'  90 rpm  3'/3'                   pulleys 10"x 3'/3'  10"x3'/3'                   Serratus wall slides  10x5"  10x5"                   Scap punches  x15  x15                   Scap ABC's  x1  x1                   S/L ER  NV  x15                   Bent over rows  x15  x15                   Bent over shoulder ext  x15  x15                   Bent over T's  x15  x15                   TB LPD  seated OTBx15  seated OTBx15                   TB rows  seated OTB x15  seated  GTB x15                   TB IR  NV  seated   GTB x10                   TB ER  NV  seated  GTBx10                   Towel circles against wall cw/ccw  10x5"  10x5"                                                                                                                                                                         Modalities                      CP PRN  np  np                                                                          Assessment: Tolerated treatment well  Patient would benefit from continued PT  Pt progressed to green TB for IR/ER/Rows  Pt needed VC's and TC's for proper exercise technique  Plan: Continue per plan of care

## 2019-02-11 ENCOUNTER — OFFICE VISIT (OUTPATIENT)
Dept: PHYSICAL THERAPY | Facility: REHABILITATION | Age: 79
End: 2019-02-11
Payer: COMMERCIAL

## 2019-02-11 DIAGNOSIS — M75.82 ROTATOR CUFF TENDINITIS, LEFT: Primary | ICD-10-CM

## 2019-02-11 PROCEDURE — 97112 NEUROMUSCULAR REEDUCATION: CPT | Performed by: PHYSICAL THERAPIST

## 2019-02-11 PROCEDURE — 97110 THERAPEUTIC EXERCISES: CPT | Performed by: PHYSICAL THERAPIST

## 2019-02-11 PROCEDURE — 97140 MANUAL THERAPY 1/> REGIONS: CPT | Performed by: PHYSICAL THERAPIST

## 2019-02-11 NOTE — PROGRESS NOTES
Daily Note     Today's date: 2019  Patient name: Marsha Archibald  : 1940  MRN: [id number]  Referring provider: Gayatri Dutta MD  Dx:   Encounter Diagnosis     ICD-10-CM    1  Rotator cuff tendinitis, left M75 82            1on1 entire session, Pt was treated by RAVIN Tovar under direct supervision of [place], PT, MSPT, OCS  Subjective: Pt reports no pain but general soreness  Objective: See treatment diary below  Manual                    PROM L shoulder flex, ABD, ER and post/inf GH JM's  AC        TP  [facility]        Np  [facility]                                                                                                                       Exercise Diary                    UBE  90 rpm 3'/3'  90 rpm  3'/3'   90 rpm  3'/3'                 pulleys 10"x 3'/3'  10"x3'/3'  10"x3'/3'                 Serratus wall slides  10x5"  10x5"  10x5"                 Scap punches  x15  x15  x15                 Scap ABC's  x1  x1  x1                 S/L ER  NV  x15  np                 Bent over rows  x15  x15  x15                 Bent over shoulder ext  x15  x15  x15                 Bent over T's  x15  x15  x15                 TB LPD  seated OTBx15  seated OTBx15  seated OTBx15                 TB rows  seated OTB x15  seated  GTB x15  seated  GTB x15                 TB IR  NV  seated   GTB x10  seated  GTB x15                 TB ER  NV  seated  GTBx10  seated  GTB x15                 Towel circles against wall cw/ccw  10x5"  10x5"  1 min each                                                                                                                                                                       Modalities                    CP PRN  np  np  np                                                                                   Assessment: Tolerated treatment well   Patient demonstrated fatigue post treatment and Pt needed frequent VC in order to maintain proper form and body mechanics during exercises      Plan: Continue per plan of care

## 2019-02-13 ENCOUNTER — OFFICE VISIT (OUTPATIENT)
Dept: PHYSICAL THERAPY | Facility: REHABILITATION | Age: 79
End: 2019-02-13
Payer: COMMERCIAL

## 2019-02-13 DIAGNOSIS — M75.82 ROTATOR CUFF TENDINITIS, LEFT: Primary | ICD-10-CM

## 2019-02-13 PROCEDURE — 97140 MANUAL THERAPY 1/> REGIONS: CPT

## 2019-02-13 PROCEDURE — 97112 NEUROMUSCULAR REEDUCATION: CPT

## 2019-02-13 NOTE — PROGRESS NOTES
Daily Note     Today's date: 2019  Patient name: Sebastian King  : 1940  MRN: [id number]  Referring provider: Troy Chase MD  Dx:   Encounter Diagnosis     ICD-10-CM    1  Rotator cuff tendinitis, left M75 82                   Subjective: Pt reported continued pain with daily activities, performs to his tolerance  Objective: See treatment diary below  Manual                  PROM L shoulder flex, ABD, ER and post/inf GH JM's  AC        TP   AC        Np  COHEN  TC  And TP                                                                                                                     Exercise Diary                  UBE  90 rpm 3'/3'  90 rpm  3'/3'   90 rpm  3'/3'  90 rpm 3/'3'               pulleys 10"x 3'/3'  10"x3'/3'  10"x3'/3'  10"x3' ea  Flex/scap               Serratus wall slides  10x5"  10x5"  10x5" 10x5"               Scap punches  x15  x15  x15  3"x15               Scap ABC's  x1  x1  x1  x1               S/L ER  NV  x15  np  np               Bent over rows  x15  x15  x15  15               Bent over shoulder ext  x15  x15  x15 15                Bent over T's  x15  x15  x15  15               TB LPD  seated OTBx15  seated OTBx15  seated OTBx15  seated  otb x15               TB rows  seated OTB x15  seated  GTB x15  seated  GTB x15  seated  gtb x15               TB IR  NV  seated   GTB x10  seated  GTB x15  seated  gtb x15               TB ER  NV  seated  GTBx10  seated  GTB x15  seated  gtb x15               Towel circles against wall cw/ccw  10x5"  10x5"  1 min each  15 ea                                                                                                                                                                      Modalities                    CP PRN  np  np  np                                                                        Assessment: Tolerated treatment well   Patient demonstrated fatigue post treatment and exhibited good technique with therapeutic exercises  VC's needed for correct technique throughout session  Discomfort at end range with PROM  Continue to progress as tolerated  Plan: Continue per plan of care

## 2019-02-18 ENCOUNTER — OFFICE VISIT (OUTPATIENT)
Dept: PHYSICAL THERAPY | Facility: REHABILITATION | Age: 79
End: 2019-02-18
Payer: COMMERCIAL

## 2019-02-18 DIAGNOSIS — M75.82 ROTATOR CUFF TENDINITIS, LEFT: Primary | ICD-10-CM

## 2019-02-18 PROCEDURE — 97110 THERAPEUTIC EXERCISES: CPT | Performed by: PHYSICAL THERAPIST

## 2019-02-18 PROCEDURE — 97140 MANUAL THERAPY 1/> REGIONS: CPT | Performed by: PHYSICAL THERAPIST

## 2019-02-18 NOTE — PROGRESS NOTES
Daily Note     Today's date: 2019  Patient name: Jen Moore  : 1940  MRN: [id number]  Referring provider: Danette Garg MD  Dx:   Encounter Diagnosis     ICD-10-CM    1  Rotator cuff tendinitis, left M75 82                   Subjective: Pt reported continued pain in tricep and bicep       Objective: See treatment diary below  Manual                PROM L shoulder flex, ABD, ER and post/inf GH JM's  AC        TP  [facility]        Np  COHEN  TC  And TP                                                                                                                     Exercise Diary                UBE  90 rpm 3'/3'  90 rpm  3'/3'   90 rpm  3'/3'  90 rpm 3/'3'  90 rpm 3/'3'             pulleys 10"x 3'/3'  10"x3'/3'  10"x3'/3'  10"x3' ea  Flex/scap   10"x3' ea   Flex/scap             Serratus wall slides  10x5"  10x5"  10x5" 10x5"  10x5"             Scap punches  x15  x15  x15  3"x15  3"x15, 2#             Scap ABC's  x1  x1  x1  x1  x2, 2#             S/L ER  NV  x15  np  np               Bent over rows  x15  x15  x15  15  15x, 2#             Bent over shoulder ext  x15  x15  x15 15   15x, 2#             Bent over T's  x15  x15  x15  15  15x, 2#             TB LPD  seated OTBx15  seated OTBx15  seated OTBx15  seated  otb x15   seated  otb x15             TB rows  seated OTB x15  seated  GTB x15  seated  GTB x15  seated  gtb [employer]   seated  gtb x15             TB IR  NV  seated   GTB x10  seated  GTB x15  seated  gtb [employer]  gtb x15             TB ER  NV  seated  GTBx10  seated  GTB x15  seated  gtb [employer]   seated  gtb x15             Towel circles against wall cw/ccw  10x5"  10x5"  1 min each  15 ea   15 ea                                                                                                                                                                    Modalities                    CP PRN  np  np  np                                                                        Assessment: Tolerated treatment well  Tolerated progression of exercise intensity well with only minor fatigue  Continues to have tightness at end range flex/abd with PROM  Verbal and visual cues needed to perform exercises properly  Would benefit from continued PT  Plan: Continue per plan of care

## 2019-02-19 ENCOUNTER — APPOINTMENT (OUTPATIENT)
Dept: LAB | Facility: CLINIC | Age: 79
End: 2019-02-19
Payer: COMMERCIAL

## 2019-02-19 ENCOUNTER — TRANSCRIBE ORDERS (OUTPATIENT)
Dept: LAB | Facility: CLINIC | Age: 79
End: 2019-02-19

## 2019-02-19 DIAGNOSIS — I48.0 PAROXYSMAL ATRIAL FIBRILLATION (HCC): Primary | ICD-10-CM

## 2019-02-19 DIAGNOSIS — I48.0 PAROXYSMAL ATRIAL FIBRILLATION (HCC): ICD-10-CM

## 2019-02-19 LAB
INR PPP: 2.11 (ref 0.86–1.17)
PROTHROMBIN TIME: 23.7 SECONDS (ref 11.8–14.2)

## 2019-02-19 PROCEDURE — 85610 PROTHROMBIN TIME: CPT

## 2019-02-19 PROCEDURE — 36415 COLL VENOUS BLD VENIPUNCTURE: CPT

## 2019-02-20 ENCOUNTER — APPOINTMENT (OUTPATIENT)
Dept: PHYSICAL THERAPY | Facility: REHABILITATION | Age: 79
End: 2019-02-20
Payer: COMMERCIAL

## 2019-02-21 ENCOUNTER — OFFICE VISIT (OUTPATIENT)
Dept: PHYSICAL THERAPY | Facility: REHABILITATION | Age: 79
End: 2019-02-21
Payer: COMMERCIAL

## 2019-02-21 DIAGNOSIS — M75.82 ROTATOR CUFF TENDINITIS, LEFT: Primary | ICD-10-CM

## 2019-02-21 PROCEDURE — 97110 THERAPEUTIC EXERCISES: CPT | Performed by: PHYSICAL THERAPY ASSISTANT

## 2019-02-21 PROCEDURE — 97140 MANUAL THERAPY 1/> REGIONS: CPT | Performed by: PHYSICAL THERAPY ASSISTANT

## 2019-02-21 NOTE — PROGRESS NOTES
Daily Note     Today's date: 2019  Patient name: Cielo Lemons  : 1940  MRN: [id number]  Referring provider: J Luis Luciano MD  Dx:   Encounter Diagnosis     ICD-10-CM    1  Rotator cuff tendinitis, left M75 82                   Subjective: Pt reported he did not sleep well last night due to pain  Objective: See treatment diary below  Manual              PROM L shoulder flex, ABD, ER and post/inf GH JM's  AC        TP  [facility]        Np  COHEN  TC  And TP    RK no mobs                                                                                                                 Exercise Diary              UBE  90 rpm 3'/3'  90 rpm  3'/3'   90 rpm  3'/3'  90 rpm 3/'3'  90 rpm 3/'3'  90 rpm  3'/3'           pulleys 10"x 3'/3'  10"x3'/3'  10"x3'/3'  10"x3' ea  Flex/scap   10"x3' ea   Flex/scap  10"x3' each  Flx/scap           Serratus wall slides  10x5"  10x5"  10x5" 10x5"  10x5"  10x5"           Scap punches  x15  x15  x15  3"x15  3"x15, 2#  3"x20  2#           Scap ABC's  x1  x1  x1  x1  x2, 2#  2# x2           S/L ER  NV  x15  np  np               Bent over rows  x15  x15  x15  15  15x, 2#  20x 2#           Bent over shoulder ext  x15  x15  x15 15   15x, 2#  20x 2#           Bent over T's  x15  x15  x15  15  15x, 2#  20X 2#           TB LPD  seated OTBx15  seated OTBx15  seated OTBx15  seated  otb x15   seated  otb x15  seated  gtb x20           TB rows  seated OTB x15  seated  GTB x15  seated  GTB x15  seated  gtb x15   seated  gtb x15  seated gtb x20           TB IR  NV  seated   GTB x10  seated  GTB x15  seated  gtb x15   seated  gtb x15  seated gtb x20           TB ER  NV  seated  GTBx10  seated  GTB x15  seated  gtb x15   seated  gtb x15  seated gtb x20           Towel circles against wall cw/ccw  10x5"  10x5"  1 min each  15 ea   15 ea   15 each                                                                                                                                                                 Modalities   2/4 2/7 2/11                 CP PRN  np  np  np                                                                        Assessment: Tolerated treatment well  Tolerated progression of exercises well with only minor fatigue and no c/o pain    Continues to have tightness at end range flex/abd with PROM  Pt requires verbal and tactile cues for good technique with TE  Would benefit from continued PT  Plan: Continue per plan of care

## 2019-02-25 ENCOUNTER — OFFICE VISIT (OUTPATIENT)
Dept: PHYSICAL THERAPY | Facility: REHABILITATION | Age: 79
End: 2019-02-25
Payer: COMMERCIAL

## 2019-02-25 DIAGNOSIS — M75.82 ROTATOR CUFF TENDINITIS, LEFT: Primary | ICD-10-CM

## 2019-02-25 PROCEDURE — 97140 MANUAL THERAPY 1/> REGIONS: CPT | Performed by: PHYSICAL THERAPIST

## 2019-02-25 PROCEDURE — G8991 OTHER PT/OT GOAL STATUS: HCPCS | Performed by: PHYSICAL THERAPIST

## 2019-02-25 PROCEDURE — G8992 OTHER PT/OT  D/C STATUS: HCPCS | Performed by: PHYSICAL THERAPIST

## 2019-02-25 PROCEDURE — 97112 NEUROMUSCULAR REEDUCATION: CPT | Performed by: PHYSICAL THERAPIST

## 2019-02-25 PROCEDURE — 97110 THERAPEUTIC EXERCISES: CPT | Performed by: PHYSICAL THERAPIST

## 2019-02-25 NOTE — PROGRESS NOTES
PT Re-Evaluation     Today's date: 2019  Patient name: Karri Jordan  :   MRN: [id number]  Referring provider: Pinkey Canavan, MD  Dx:   Encounter Diagnosis     ICD-10-CM    1  Rotator cuff tendinitis, left M75 82        Start Time: 1458  Stop Time: 1552  Total time in clinic (min): 54 minutes    Assessment  Assessment details: Pt has demonstrated improvement in shoulder ROM, strength with a decrease in pain since starting therapy  Pt feels comfortable being d/c to ongoing HEP at this time  Thank you for the referral    Impairments: abnormal or restricted ROM, impaired physical strength and pain with function  Understanding of Dx/Px/POC: good   Prognosis: good    Goals  Short Term:  Pt will report decreased levels of pain by at least 2 subjective ratings in 4 weeks-met  Pt will demonstrate improved ROM by at least 10 degrees in 4 weeks-partially met  Pt will demonstrate improved strength by 1/2 grade MMT in 4 weeks-met  Long Term:   Pt will be independent in their HEP in 8 weeks-met  Pt will demonstrate improved FOTO, > 68-met  Pt will be independent with all ADL's-met      Plan  Planned therapy interventions: joint mobilization, manual therapy, neuromuscular re-education, patient education, therapeutic exercise, therapeutic activities, flexibility and home exercise program  Treatment plan discussed with: patient        Subjective Evaluation    History of Present Illness  Mechanism of injury: Pt reports a decrease in his pain and is no longer constant like it was  Pt denies any significant functional deficits, noting pain primarily if he makes quick movements      Pain  At worst pain ratin  Location: L UE bicep/deltoid region    Treatments  Current treatment: physical therapy  Patient Goals  Patient goals for therapy: decreased pain, increased strength and increased motion          Objective     Active Range of Motion   Left Shoulder   Flexion: 150 degrees   Abduction: 130 degrees External rotation BTH: C2   Internal rotation BTB: T12     Additional Active Range of Motion Details  Functional ER limited by decreased elbow flexion AROM  Passive Range of Motion   Left Shoulder   Flexion: 170 degrees   Abduction: 140 degrees     Strength/Myotome Testing     Left Shoulder     Planes of Motion   Flexion: 5   Abduction: 4   External rotation at 0°: 4+   Internal rotation at 0°: 4+     Tests     Additional Tests Details  Minimal pain with empty can this session        Flowsheet Rows      Most Recent Value   PT/OT G-Codes   Current Score  73   Projected Score  68   FOTO information reviewed  Yes   Assessment Type  Discharge   G code set  Other PT/OT Primary   Other PT Primary Goal Status ()  CJ   Other PT Primary Discharge Status ()  CJ          Precautions: OA, HTN, diabetes, osteoporosis, pacemaker (No e-stim), 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, CABG x6

## 2019-02-25 NOTE — PROGRESS NOTES
Daily Note     Today's date: 2019  Patient name: Hoa Duncan  : 1940  MRN: [id number]  Referring provider: Darien Stout MD  Dx:   Encounter Diagnosis     ICD-10-CM    1  Rotator cuff tendinitis, left M75 82                   Subjective: Reports no improvement in status today       Objective: See treatment diary below    Manual            PROM L shoulder flex, ABD, ER and post/inf GH JM's  AC        TP  [facility]        Np  COHEN  TC  And TP    RK no mobs  CW                                                                                                               Exercise Diary            UBE  90 rpm 3'/3'  90 rpm  3'/3'   90 rpm  3'/3'  90 rpm 3/'3'  90 rpm 3/'3'  90 rpm  3'/3'  3/3'          pulleys 10"x 3'/3'  10"x3'/3'  10"x3'/3'  10"x3' ea  Flex/scap   10"x3' ea   Flex/scap  10"x3' each  Flx/scap  3' ea flex/scap         Serratus wall slides  10x5"  10x5"  10x5" 10x5"  10x5"  10x5"  10x10"          Scap punches  x15  x15  x15  3"x15  3"x15, 2#  3"x20  2#  3"x20 2#         Scap ABC's  x1  x1  x1  x1  x2, 2#  2# x2  2# x2         S/L ER  NV  x15  np  np      np         Bent over rows  x15  x15  x15  15  15x, 2#  20x 2#  20x 2#         Bent over shoulder ext  x15  x15  x15 15   15x, 2#  20x 2#  20x 2#         Bent over T's  x15  x15  x15  15  15x, 2#  20X 2#  20x 2#         TB LPD  seated OTBx15  seated OTBx15  seated OTBx15  seated  otb x15   seated  otb x15  seated  gtb x20  seated gtb x 20         TB rows  seated OTB x15  seated  GTB x15  seated  GTB x15  seated  gtb [employer]   seated  gtb x15  seated gtb x20  seated gtb x20         TB IR  NV  seated   GTB x10  seated  GTB x15  seated  gtb [employer]   seated  gtb x15  seated gtb x20  seated gtb x20          TB ER  NV  seated  GTBx10  seated  GTB x15  seated  gtb [employer]   seated  gtb x15  seated gtb x20 Seated gtb x20          Towel circles against wall cw/ccw  10x5"  10x5"  1 min each  15 ea  Ricci Davila each                                                                                                                                                               Modalities   2/4 2/7 2/11                 CP PRN  np  np  np                                                                     Assessment: Tolerated treatment well  Patient would benefit from continued PT, needed some cueing with correct form for standing exercises  Plan: Continue per plan of care

## 2019-02-26 ENCOUNTER — APPOINTMENT (OUTPATIENT)
Dept: PHYSICAL THERAPY | Facility: REHABILITATION | Age: 79
End: 2019-02-26
Payer: COMMERCIAL

## 2019-02-28 ENCOUNTER — APPOINTMENT (OUTPATIENT)
Dept: PHYSICAL THERAPY | Facility: REHABILITATION | Age: 79
End: 2019-02-28
Payer: COMMERCIAL

## 2019-03-01 ENCOUNTER — TELEPHONE (OUTPATIENT)
Dept: ADMINISTRATIVE | Facility: OTHER | Age: 79
End: 2019-03-01

## 2019-03-06 ENCOUNTER — OFFICE VISIT (OUTPATIENT)
Dept: OBGYN CLINIC | Facility: HOSPITAL | Age: 79
End: 2019-03-06
Payer: COMMERCIAL

## 2019-03-06 VITALS
SYSTOLIC BLOOD PRESSURE: 136 MMHG | WEIGHT: 187.6 LBS | BODY MASS INDEX: 30.15 KG/M2 | DIASTOLIC BLOOD PRESSURE: 75 MMHG | HEART RATE: 85 BPM | HEIGHT: 66 IN

## 2019-03-06 DIAGNOSIS — M75.82 ROTATOR CUFF TENDINITIS, LEFT: Primary | ICD-10-CM

## 2019-03-06 DIAGNOSIS — M25.522 PAIN IN LEFT ELBOW: ICD-10-CM

## 2019-03-06 PROCEDURE — 99213 OFFICE O/P EST LOW 20 MIN: CPT | Performed by: ORTHOPAEDIC SURGERY

## 2019-03-06 NOTE — PROGRESS NOTES
ASSESSMENT/PLAN:    Assessment:   Left elbow post-traumatic arthritis  Left rotator cuff tendinitis    Plan: Activites as tolerated    Follow Up:  PRN      _____________________________________________________  CHIEF COMPLAINT:  Chief Complaint   Patient presents with    Left Upper Arm - Follow-up         SUBJECTIVE:  Cielo Lemons is a 66y o  year old male who presents for follow up regarding left shoulder RTC tendonitis and left elbow post traumatic arthritis  Patient received an injection at the last visit and was referred to PT  Patient states overall he is doing well and offers no complaints  He states he takes 2 Tylenol OTC every morning  PAST MEDICAL HISTORY:  Past Medical History:   Diagnosis Date    Arthritis     Carotid stenosis     Coronary artery disease     Last Assessed:  11/5/13    CVA [facility]cere[facility] vascular [facility])     Last Assessed:  1/10/18    Diabetes mellitus ([address] )     Controlled with kidney complication, Last Assessed:  1/10/18    Hematuria     Hyperlipidemia     Hypertension     Kidney stone     Renal disorder     Trigger finger     Vitamin D deficiency        PAST SURGICAL HISTORY:  Past Surgical History:   Procedure Laterality Date    CARDIAC SURGERY      CIRCUMCISION      Resolved:  1964, Elective    COLONOSCOPY      Resolved:  2006    CORONARY ARTERY BYPASS GRAFT  2000    6 vesels    CYSTOSCOPY      Resolved:  4/9/10, Diagnostic    CYSTOSCOPY  12/14/2018    Dr David Bauman      left forearm fx with plate repair      HUMERUS FRACTURE SURGERY      Open, Greater tuberosity    LITHOTRIPSY  09/12/2013    Renal    NEPHROSTOMY Bilateral     With drainage    VASECTOMY         FAMILY HISTORY:  Family History   Problem Relation Age of Onset    Heart attack Mother     Coronary artery disease Mother     Emphysema Father     Cancer Brother         Leukemia    Hypertension Son     Diabetes Maternal Grandfather  Hypertension Other     Other Other         Cerebrovascular accident (CVA)       SOCIAL HISTORY:  Social History     Tobacco Use    Smoking status: Former Smoker     Packs/day: 1 50     Years: 40 00     Pack years: 60 00     Types: Cigarettes    Smokeless tobacco: Never Used    Tobacco comment: quit 30 years ago  Substance Use Topics    Alcohol use: No    Drug use: No       MEDICATIONS:    Current Outpatient Medications:     amLODIPine (NORVASC) 5 mg tablet, Take 1 tablet (5 mg total) by mouth daily for 90 days, Disp: 90 tablet, Rfl: 1    atorvastatin (LIPITOR) 20 mg tablet, Take 1 tablet (20 mg total) by mouth daily, Disp: 90 tablet, Rfl: 1    cyanocobalamin 1000 MCG tablet, Take 1 tablet by mouth daily, Disp: 30 tablet, Rfl: 3    loratadine (CLARITIN) 10 mg tablet, Take 0 5 tablets by mouth daily, Disp: 15 tablet, Rfl: 3    metoprolol tartrate (LOPRESSOR) 50 mg tablet, Take 1 tablet by mouth every 12 (twelve) hours, Disp: 60 tablet, Rfl: 3    NOVOLIN N 100 UNIT/ML subcutaneous injection, 12 units in the morning and 22 units in the evening, Disp: 10 mL, Rfl: 0    warfarin (COUMADIN) 5 mg tablet, Take by mouth, Disp: , Rfl:     metFORMIN (GLUCOPHAGE) 500 mg tablet, Take 1 tablet (500 mg total) by mouth every 12 (twelve) hours for 30 days, Disp: 60 tablet, Rfl: 5    ALLERGIES:  Allergies   Allergen Reactions    Flu Virus Vaccine Rash    Haemophilus Influenzae Rash       REVIEW OF SYSTEMS:  Pertinent items are noted in HPI  A comprehensive review of systems was negative      LABS:  HgA1c:   Lab Results   Component Value Date    HGBA1C 7 4 (H) 04/26/2018     BMP:   Lab Results   Component Value Date    GLUCOSE 156 (H) 12/31/2015    CALCIUM 9 6 09/17/2018     12/31/2015    K 3 8 09/17/2018    CO2 29 09/17/2018     09/17/2018    BUN 26 (H) 09/17/2018    CREATININE 1 40 (H) 09/17/2018           _____________________________________________________  PHYSICAL EXAMINATION:  General: well developed and well nourished, alert, oriented times 3 and appears comfortable  Psychiatric: Normal  HEENT: Trachea Midline, No torticollis  Cardiovascular: No discernable arrhythmia  Pulmonary: No wheezing or stridor  Skin: No masses, erthema, lacerations, fluctation, ulcerations  Neurovascular: Sensation Intact to the Median, Ulnar, Radial Nerve, Motor Intact to the Median, Ulnar, Radial Nerve and Pulses Intact    MUSCULOSKELETAL EXAMINATION:  Extremities:  Left upper extremity  AROM left elbow ; supination to 30; full pronation  Crepitus felt through elbow ROM  LUE warm/well perfused  No TTP  Patient has full left shoulder ROM when compared to unaffected side     4/5 resisted shoulder ER strength, 5/5 subscap strength    _____________________________________________________  STUDIES REVIEWED:  No Studies to review      PROCEDURES PERFORMED:  Procedures  No Procedures performed today     Scribe Attestation    I,:   [employer] am acting as a scribe while in the presence of the attending physician :        I,:   Dima Pappas MD personally performed the services described in this documentation    as scribed in my presence :          Scribe Attestation    I,:   Trisha Nails am acting as a scribe while in the presence of the attending physician :        I,:   Dima Pappas MD personally performed the services described in this documentation    as scribed in my presence :

## 2019-04-08 ENCOUNTER — APPOINTMENT (OUTPATIENT)
Dept: LAB | Facility: HOSPITAL | Age: 79
End: 2019-04-08
Attending: ORTHOPAEDIC SURGERY
Payer: COMMERCIAL

## 2019-04-08 ENCOUNTER — PREP FOR PROCEDURE (OUTPATIENT)
Dept: OBGYN CLINIC | Facility: HOSPITAL | Age: 79
End: 2019-04-08

## 2019-04-08 ENCOUNTER — OFFICE VISIT (OUTPATIENT)
Dept: OBGYN CLINIC | Facility: HOSPITAL | Age: 79
End: 2019-04-08
Payer: COMMERCIAL

## 2019-04-08 VITALS
BODY MASS INDEX: 30.31 KG/M2 | HEIGHT: 66 IN | WEIGHT: 188.6 LBS | SYSTOLIC BLOOD PRESSURE: 167 MMHG | DIASTOLIC BLOOD PRESSURE: 97 MMHG | HEART RATE: 86 BPM

## 2019-04-08 DIAGNOSIS — M75.82 ROTATOR CUFF TENDINITIS, LEFT: Primary | ICD-10-CM

## 2019-04-08 DIAGNOSIS — M75.52 SUBACROMIAL BURSITIS OF LEFT SHOULDER JOINT: Primary | ICD-10-CM

## 2019-04-08 DIAGNOSIS — M75.52 SUBACROMIAL BURSITIS OF LEFT SHOULDER JOINT: ICD-10-CM

## 2019-04-08 LAB
ALBUMIN SERPL BCP-MCNC: 4 G/DL (ref 3.5–5)
ALP SERPL-CCNC: 105 U/L (ref 46–116)
ALT SERPL W P-5'-P-CCNC: 36 U/L (ref 12–78)
ANION GAP SERPL CALCULATED.3IONS-SCNC: 2 MMOL/L (ref 4–13)
AST SERPL W P-5'-P-CCNC: 22 U/L (ref 5–45)
BILIRUB SERPL-MCNC: 0.84 MG/DL (ref 0.2–1)
BUN SERPL-MCNC: 19 MG/DL (ref 5–25)
CALCIUM SERPL-MCNC: 9.3 MG/DL (ref 8.3–10.1)
CHLORIDE SERPL-SCNC: 112 MMOL/L (ref 100–108)
CO2 SERPL-SCNC: 30 MMOL/L (ref 21–32)
CREAT SERPL-MCNC: 1.39 MG/DL (ref 0.6–1.3)
GFR SERPL CREATININE-BSD FRML MDRD: 48 ML/MIN/1.73SQ M
GLUCOSE SERPL-MCNC: 112 MG/DL (ref 65–140)
POTASSIUM SERPL-SCNC: 4 MMOL/L (ref 3.5–5.3)
PROT SERPL-MCNC: 7.6 G/DL (ref 6.4–8.2)
SODIUM SERPL-SCNC: 144 MMOL/L (ref 136–145)

## 2019-04-08 PROCEDURE — 99214 OFFICE O/P EST MOD 30 MIN: CPT | Performed by: ORTHOPAEDIC SURGERY

## 2019-04-08 PROCEDURE — 36415 COLL VENOUS BLD VENIPUNCTURE: CPT

## 2019-04-08 PROCEDURE — 80053 COMPREHEN METABOLIC PANEL: CPT

## 2019-04-16 ENCOUNTER — TRANSCRIBE ORDERS (OUTPATIENT)
Dept: LAB | Facility: HOSPITAL | Age: 79
End: 2019-04-16

## 2019-04-16 ENCOUNTER — APPOINTMENT (OUTPATIENT)
Dept: LAB | Facility: HOSPITAL | Age: 79
End: 2019-04-16
Attending: ORTHOPAEDIC SURGERY
Payer: COMMERCIAL

## 2019-04-16 DIAGNOSIS — I49.5 TACHY-BRADY SYNDROME (HCC): ICD-10-CM

## 2019-04-16 DIAGNOSIS — I49.5 TACHY-BRADY SYNDROME (HCC): Primary | ICD-10-CM

## 2019-04-17 ENCOUNTER — HOSPITAL ENCOUNTER (OUTPATIENT)
Dept: RADIOLOGY | Facility: HOSPITAL | Age: 79
Discharge: HOME | End: 2019-04-17
Attending: ORTHOPAEDIC SURGERY

## 2019-04-29 ENCOUNTER — TRANSCRIBE ORDERS (OUTPATIENT)
Dept: LAB | Facility: CLINIC | Age: 79
End: 2019-04-29

## 2019-04-29 ENCOUNTER — APPOINTMENT (OUTPATIENT)
Dept: LAB | Facility: CLINIC | Age: 79
End: 2019-04-29
Payer: COMMERCIAL

## 2019-04-29 DIAGNOSIS — I48.0 PAROXYSMAL ATRIAL FIBRILLATION (HCC): Primary | ICD-10-CM

## 2019-04-29 DIAGNOSIS — I48.0 PAROXYSMAL ATRIAL FIBRILLATION (HCC): ICD-10-CM

## 2019-04-29 LAB
INR PPP: 2.89 (ref 0.86–1.17)
PROTHROMBIN TIME: 30.3 SECONDS (ref 11.8–14.2)

## 2019-04-29 PROCEDURE — 85610 PROTHROMBIN TIME: CPT

## 2019-04-29 PROCEDURE — 36415 COLL VENOUS BLD VENIPUNCTURE: CPT

## 2019-05-08 ENCOUNTER — HOSPITAL ENCOUNTER (OUTPATIENT)
Dept: RADIOLOGY | Facility: HOSPITAL | Age: 79
Discharge: HOME | End: 2019-05-08
Attending: ORTHOPAEDIC SURGERY

## 2019-05-13 ENCOUNTER — TELEPHONE (OUTPATIENT)
Dept: OBGYN CLINIC | Facility: HOSPITAL | Age: 79
End: 2019-05-13

## 2019-06-05 DIAGNOSIS — I10 BENIGN ESSENTIAL HYPERTENSION: ICD-10-CM

## 2019-06-05 RX ORDER — AMLODIPINE BESYLATE 5 MG/1
5 TABLET ORAL DAILY
Qty: 90 TABLET | Refills: 1 | Status: SHIPPED | OUTPATIENT
Start: 2019-06-05 | End: 2020-01-06

## 2019-06-14 ENCOUNTER — TELEPHONE (OUTPATIENT)
Dept: FAMILY MEDICINE CLINIC | Facility: CLINIC | Age: 79
End: 2019-06-14

## 2019-06-14 DIAGNOSIS — E78.2 MIXED HYPERLIPIDEMIA: ICD-10-CM

## 2019-06-14 RX ORDER — ATORVASTATIN CALCIUM 40 MG/1
40 TABLET, FILM COATED ORAL DAILY
Qty: 90 TABLET | Refills: 3
Start: 2019-06-14 | End: 2019-08-02 | Stop reason: DRUGHIGH

## 2019-07-01 DIAGNOSIS — E78.2 MIXED HYPERLIPIDEMIA: ICD-10-CM

## 2019-07-01 RX ORDER — ATORVASTATIN CALCIUM 20 MG/1
TABLET, FILM COATED ORAL
Qty: 90 TABLET | Refills: 1 | OUTPATIENT
Start: 2019-07-01

## 2019-07-01 NOTE — TELEPHONE ENCOUNTER
Spoke with patient verified dosage he is taking atorvastatin 40 mg daily per [place]  Would like to hold off on refill until gets labs done to see where his cholesterol is, should have enough med until then  Will call office before he runs out

## 2019-07-01 NOTE — TELEPHONE ENCOUNTER
Got refill request for atorvastatin for 20mg, but my note states pt is on 40mg  Please ask pt what is his current atorvastatin dose  Thanks

## 2019-07-03 ENCOUNTER — APPOINTMENT (OUTPATIENT)
Dept: LAB | Facility: CLINIC | Age: 79
End: 2019-07-03
Payer: COMMERCIAL

## 2019-07-03 ENCOUNTER — TRANSCRIBE ORDERS (OUTPATIENT)
Dept: LAB | Facility: CLINIC | Age: 79
End: 2019-07-03

## 2019-07-03 DIAGNOSIS — Z79.01 LONG TERM (CURRENT) USE OF ANTICOAGULANTS: Primary | ICD-10-CM

## 2019-07-03 DIAGNOSIS — I48.0 PAROXYSMAL ATRIAL FIBRILLATION (HCC): ICD-10-CM

## 2019-07-03 LAB
INR PPP: 3.82 (ref 0.84–1.19)
PROTHROMBIN TIME: 37.1 SECONDS (ref 11.6–14.5)

## 2019-07-03 PROCEDURE — 36415 COLL VENOUS BLD VENIPUNCTURE: CPT

## 2019-07-03 PROCEDURE — 85610 PROTHROMBIN TIME: CPT

## 2019-07-10 ENCOUNTER — APPOINTMENT (OUTPATIENT)
Dept: LAB | Facility: CLINIC | Age: 79
End: 2019-07-10
Payer: COMMERCIAL

## 2019-07-10 DIAGNOSIS — N18.30 TYPE 2 DIABETES MELLITUS WITH STAGE 3 CHRONIC KIDNEY DISEASE, WITH LONG-TERM CURRENT USE OF INSULIN (HCC): ICD-10-CM

## 2019-07-10 DIAGNOSIS — E11.22 TYPE 2 DIABETES MELLITUS WITH STAGE 3 CHRONIC KIDNEY DISEASE, WITH LONG-TERM CURRENT USE OF INSULIN (HCC): ICD-10-CM

## 2019-07-10 DIAGNOSIS — I10 ESSENTIAL HYPERTENSION: ICD-10-CM

## 2019-07-10 DIAGNOSIS — E78.5 HYPERLIPIDEMIA, UNSPECIFIED HYPERLIPIDEMIA TYPE: ICD-10-CM

## 2019-07-10 DIAGNOSIS — Z79.4 TYPE 2 DIABETES MELLITUS WITH STAGE 3 CHRONIC KIDNEY DISEASE, WITH LONG-TERM CURRENT USE OF INSULIN (HCC): ICD-10-CM

## 2019-07-10 LAB
ALBUMIN SERPL BCP-MCNC: 3.8 G/DL (ref 3.5–5)
ALP SERPL-CCNC: 119 U/L (ref 46–116)
ALT SERPL W P-5'-P-CCNC: 41 U/L (ref 12–78)
ANION GAP SERPL CALCULATED.3IONS-SCNC: 9 MMOL/L (ref 4–13)
AST SERPL W P-5'-P-CCNC: 23 U/L (ref 5–45)
BILIRUB SERPL-MCNC: 0.85 MG/DL (ref 0.2–1)
BUN SERPL-MCNC: 21 MG/DL (ref 5–25)
CALCIUM SERPL-MCNC: 9.2 MG/DL (ref 8.3–10.1)
CHLORIDE SERPL-SCNC: 107 MMOL/L (ref 100–108)
CHOLEST SERPL-MCNC: 155 MG/DL (ref 50–200)
CO2 SERPL-SCNC: 29 MMOL/L (ref 21–32)
CREAT SERPL-MCNC: 1.37 MG/DL (ref 0.6–1.3)
CREAT UR-MCNC: 107 MG/DL
ERYTHROCYTE [DISTWIDTH] IN BLOOD BY AUTOMATED COUNT: 14 % (ref 11.6–15.1)
EST. AVERAGE GLUCOSE BLD GHB EST-MCNC: 171 MG/DL
GFR SERPL CREATININE-BSD FRML MDRD: 49 ML/MIN/1.73SQ M
GLUCOSE P FAST SERPL-MCNC: 178 MG/DL (ref 65–99)
HBA1C MFR BLD: 7.6 % (ref 4.2–6.3)
HCT VFR BLD AUTO: 46.2 % (ref 36.5–49.3)
HDLC SERPL-MCNC: 35 MG/DL (ref 40–60)
HGB BLD-MCNC: 15.1 G/DL (ref 12–17)
LDLC SERPL CALC-MCNC: 80 MG/DL (ref 0–100)
MCH RBC QN AUTO: 27.9 PG (ref 26.8–34.3)
MCHC RBC AUTO-ENTMCNC: 32.7 G/DL (ref 31.4–37.4)
MCV RBC AUTO: 85 FL (ref 82–98)
MICROALBUMIN UR-MCNC: 859 MG/L (ref 0–20)
MICROALBUMIN/CREAT 24H UR: 803 MG/G CREATININE (ref 0–30)
NONHDLC SERPL-MCNC: 120 MG/DL
PLATELET # BLD AUTO: 222 THOUSANDS/UL (ref 149–390)
PMV BLD AUTO: 11.3 FL (ref 8.9–12.7)
POTASSIUM SERPL-SCNC: 3.9 MMOL/L (ref 3.5–5.3)
PROT SERPL-MCNC: 7.3 G/DL (ref 6.4–8.2)
RBC # BLD AUTO: 5.41 MILLION/UL (ref 3.88–5.62)
SODIUM SERPL-SCNC: 145 MMOL/L (ref 136–145)
TRIGL SERPL-MCNC: 202 MG/DL
TSH SERPL DL<=0.05 MIU/L-ACNC: 1.57 UIU/ML (ref 0.36–3.74)
WBC # BLD AUTO: 8.29 THOUSAND/UL (ref 4.31–10.16)

## 2019-07-10 PROCEDURE — 80061 LIPID PANEL: CPT

## 2019-07-10 PROCEDURE — 84443 ASSAY THYROID STIM HORMONE: CPT

## 2019-07-10 PROCEDURE — 83036 HEMOGLOBIN GLYCOSYLATED A1C: CPT

## 2019-07-10 PROCEDURE — 85027 COMPLETE CBC AUTOMATED: CPT

## 2019-07-10 PROCEDURE — 82043 UR ALBUMIN QUANTITATIVE: CPT

## 2019-07-10 PROCEDURE — 82570 ASSAY OF URINE CREATININE: CPT

## 2019-07-10 PROCEDURE — 80053 COMPREHEN METABOLIC PANEL: CPT

## 2019-07-15 DIAGNOSIS — E78.2 MIXED HYPERLIPIDEMIA: ICD-10-CM

## 2019-07-15 RX ORDER — ATORVASTATIN CALCIUM 20 MG/1
TABLET, FILM COATED ORAL
Qty: 90 TABLET | Refills: 0 | Status: SHIPPED | OUTPATIENT
Start: 2019-07-15 | End: 2019-10-10 | Stop reason: SDUPTHER

## 2019-07-23 ENCOUNTER — APPOINTMENT (OUTPATIENT)
Dept: LAB | Facility: CLINIC | Age: 79
End: 2019-07-23
Payer: COMMERCIAL

## 2019-07-26 ENCOUNTER — TELEPHONE (OUTPATIENT)
Dept: NEPHROLOGY | Facility: CLINIC | Age: 79
End: 2019-07-26

## 2019-07-26 DIAGNOSIS — E11.8 TYPE 2 DIABETES MELLITUS WITH COMPLICATION, WITHOUT LONG-TERM CURRENT USE OF INSULIN (HCC): ICD-10-CM

## 2019-07-31 ENCOUNTER — HOSPITAL ENCOUNTER (OUTPATIENT)
Dept: RADIOLOGY | Facility: HOSPITAL | Age: 79
Discharge: HOME | End: 2019-07-31
Attending: ORTHOPAEDIC SURGERY
Payer: COMMERCIAL

## 2019-07-31 DIAGNOSIS — M75.52 SUBACROMIAL BURSITIS OF LEFT SHOULDER JOINT: ICD-10-CM

## 2019-07-31 DIAGNOSIS — M75.82 ROTATOR CUFF TENDINITIS, LEFT: ICD-10-CM

## 2019-07-31 PROCEDURE — 23350 INJECTION FOR SHOULDER X-RAY: CPT

## 2019-07-31 PROCEDURE — 77002 NEEDLE LOCALIZATION BY XRAY: CPT

## 2019-07-31 PROCEDURE — 73201 CT UPPER EXTREMITY W/DYE: CPT

## 2019-07-31 RX ORDER — 0.9 % SODIUM CHLORIDE 0.9 %
50 VIAL (ML) INJECTION
Status: COMPLETED | OUTPATIENT
Start: 2019-07-31 | End: 2019-07-31

## 2019-07-31 RX ORDER — LIDOCAINE HYDROCHLORIDE 10 MG/ML
5 INJECTION, SOLUTION INFILTRATION; PERINEURAL
Status: COMPLETED | OUTPATIENT
Start: 2019-07-31 | End: 2019-07-31

## 2019-07-31 RX ADMIN — SODIUM CHLORIDE 15 ML: 9 INJECTION, SOLUTION INTRAMUSCULAR; INTRAVENOUS; SUBCUTANEOUS at 13:15

## 2019-07-31 RX ADMIN — LIDOCAINE HYDROCHLORIDE 5 ML: 10 INJECTION, SOLUTION INFILTRATION; PERINEURAL at 13:15

## 2019-07-31 RX ADMIN — IOHEXOL 3 ML: 300 INJECTION, SOLUTION INTRAVENOUS at 13:15

## 2019-08-02 ENCOUNTER — OFFICE VISIT (OUTPATIENT)
Dept: FAMILY MEDICINE CLINIC | Facility: CLINIC | Age: 79
End: 2019-08-02
Payer: COMMERCIAL

## 2019-08-02 VITALS
WEIGHT: 193 LBS | DIASTOLIC BLOOD PRESSURE: 78 MMHG | BODY MASS INDEX: 31.02 KG/M2 | HEIGHT: 66 IN | RESPIRATION RATE: 15 BRPM | HEART RATE: 85 BPM | SYSTOLIC BLOOD PRESSURE: 148 MMHG | TEMPERATURE: 97.9 F

## 2019-08-02 DIAGNOSIS — I10 ESSENTIAL HYPERTENSION: Primary | ICD-10-CM

## 2019-08-02 DIAGNOSIS — E66.9 OBESITY (BMI 30-39.9): ICD-10-CM

## 2019-08-02 DIAGNOSIS — R80.9 MICROALBUMINURIA: ICD-10-CM

## 2019-08-02 DIAGNOSIS — E11.22 TYPE 2 DIABETES MELLITUS WITH STAGE 3 CHRONIC KIDNEY DISEASE, WITH LONG-TERM CURRENT USE OF INSULIN (HCC): ICD-10-CM

## 2019-08-02 DIAGNOSIS — N18.30 TYPE 2 DIABETES MELLITUS WITH STAGE 3 CHRONIC KIDNEY DISEASE, WITH LONG-TERM CURRENT USE OF INSULIN (HCC): ICD-10-CM

## 2019-08-02 DIAGNOSIS — E78.5 HYPERLIPIDEMIA, UNSPECIFIED HYPERLIPIDEMIA TYPE: ICD-10-CM

## 2019-08-02 DIAGNOSIS — I48.91 ATRIAL FIBRILLATION, UNSPECIFIED TYPE (HCC): ICD-10-CM

## 2019-08-02 DIAGNOSIS — E11.42 DIABETIC POLYNEUROPATHY ASSOCIATED WITH TYPE 2 DIABETES MELLITUS (HCC): ICD-10-CM

## 2019-08-02 DIAGNOSIS — Z79.4 TYPE 2 DIABETES MELLITUS WITH STAGE 3 CHRONIC KIDNEY DISEASE, WITH LONG-TERM CURRENT USE OF INSULIN (HCC): ICD-10-CM

## 2019-08-02 PROCEDURE — 3725F SCREEN DEPRESSION PERFORMED: CPT | Performed by: FAMILY MEDICINE

## 2019-08-02 PROCEDURE — 1101F PT FALLS ASSESS-DOCD LE1/YR: CPT | Performed by: FAMILY MEDICINE

## 2019-08-02 PROCEDURE — 99214 OFFICE O/P EST MOD 30 MIN: CPT | Performed by: FAMILY MEDICINE

## 2019-08-02 NOTE — PROGRESS NOTES
Chief Complaint   Patient presents with    Follow-up     6month review labs     Health Maintenance   Topic Date Due    SLP PLAN OF CARE  1940    BMI: Followup Plan  10/14/1958    HEPATITIS B VACCINES (1 of 3 - Risk 3-dose series) 10/14/1959    DTaP,Tdap,and Td Vaccines (1 - Tdap) 10/14/1961    Diabetic Foot Exam  11/05/2014    CRC Screening: Colonoscopy  04/14/2015    Medicare Annual Wellness Visit (AWV)  05/26/2017    INFLUENZA VACCINE  07/01/2019    Fall Risk  01/25/2020    Depression Screening PHQ  01/25/2020    BMI: Adult  04/08/2020    HEMOGLOBIN A1C  07/10/2020    URINE MICROALBUMIN  07/10/2020    DM Eye Exam  07/27/2020    Pneumococcal Vaccine: 65+ Years  Completed    Pneumococcal Vaccine: Pediatrics (0 to 5 Years) and At-Risk Patients (6 to 59 Years)  Aged Out     BMI Counseling: Body mass index is 31 15 kg/m²  Discussed the patient's BMI with him  The BMI is above average  BMI counseling and education was provided to the patient  Nutrition recommendations include reducing portion sizes, decreasing overall calorie intake and 3-5 servings of fruits/vegetables daily  Exercise recommendations include strength training exercises  Assessment/Plan:  Reviewed lab in 7/2019  CBC ok  CMP stable  TSH normal  M/c 803 cannot tolerate lisinopril  Lipid 155/202/35/80 stable  hgA1C 7 6 stable     Refused flu shot because he is allergic to it  Got PCV 13 in 2015 and pneumovax in 2016  Fall precautions  RTO in 6 months            Diagnoses and all orders for this visit:    Essential hypertension  Comments:  controlled  Continue metoprolol 50mg bid and amlodipine 5mg QD  Orders:  -     Comprehensive metabolic panel; Future  -     Hemoglobin A1C; Future  -     Lipid panel; Future    Type 2 diabetes mellitus with stage 3 chronic kidney disease, with long-term current use of insulin (HCC)  Comments:  Stable  Low carb diet  Continue metformin 500mg bid and novolin 32u bid     Orders:  - Comprehensive metabolic panel; Future  -     Hemoglobin A1C; Future  -     Lipid panel; Future    Hyperlipidemia, unspecified hyperlipidemia type  Comments:  Controlled  Continue atorvastatin 20mg qhs  Orders:  -     Comprehensive metabolic panel; Future  -     Hemoglobin A1C; Future  -     Lipid panel; Future    Atrial fibrillation, unspecified type ([facility] [address] )  Comments:  Continue coumadin  Cardiology follows PT/INT  Obesity (BMI 30-39  9)    Diabetic polyneuropathy associated with type 2 diabetes mellitus (HCC)    Microalbuminuria  Comments:  Cannot tolerate ACE inhibitor because of cough  Other orders  -     Cancel: Ambulatory referral to Gastroenterology; Future  -     insulin aspart (NOVOLOG FLEXPEN) 100 Units/mL injection pen; Novolog Flexpen U-100 Insulin   2-8 units daily          Subjective:      Patient ID: Caesar Kohli is a 66 y o  male  HPI    Pt is here by himself       HTN----He is on metoprolol 50mg bid and amlodipine 5mg QD  Hx of CVA in 3/2017  Right leg feels weakness  Use walker everyday  Afib---He is on coumadin  Cardiology follows PT/INR  S/p packemaker 11/2017  [facility] cardiology from [facility] regularly  Cardiology follows PT/INR  DWIGHT---He does not use CPAP  DM---for more than years  7/2019 hgA1C 7 6   He is on meformin 500mg bid and novolin 32u bid  Denies neuropathy  Fu opthalmology Dr Sekou Romo regularly  7/2019 eye exam done per pt  FU podiatry Q 3 months       CKD3---FU nephrology Q 6 month       Hyperlipidemia---on atorvastatin 40mg qhs  Denies side effects  Lung nodule----Recent left humerus xray showed 5mm left lobe lung nodule  Had CT chest on 1/23/2019 which showed stable, no need follow up  Smoked 2ppd for 40 years  Quit 20 years ago       [facility] [place] [facility]  Still drive  Does all ADL's  Exercise regularly  Denies recent falls  Uses walker everywhere  Denies depression         The following portions of the patient's history were reviewed and updated as appropriate: allergies, current medications, past family history, past medical history, past social history, past surgical history and problem list     Review of Systems   Constitutional: Negative for appetite change, chills and fever  HENT: Negative for congestion, ear pain, sinus pain and sore throat  Eyes: Negative for discharge and itching  Respiratory: Negative for apnea, cough, chest tightness, shortness of breath and wheezing  Cardiovascular: Negative for chest pain, palpitations and leg swelling  Gastrointestinal: Negative for abdominal pain, anal bleeding, constipation, diarrhea, nausea and vomiting  Endocrine: Negative for cold intolerance, heat intolerance and polyuria  Genitourinary: Negative for difficulty urinating and dysuria  Musculoskeletal: Negative for arthralgias, back pain and myalgias  Skin: Negative for rash  Neurological: Negative for dizziness and headaches  Psychiatric/Behavioral: Negative for agitation  Objective:      /78 (BP Location: Right arm, Patient Position: Sitting, Cuff Size: Adult)   Pulse 85   Temp 97 9 °F (36 6 °C) (Oral)   Resp 15   Ht 5' 6" (1 676 m)   Wt 87 5 kg (193 lb)   BMI 31 15 kg/m²          Physical Exam   Constitutional: He appears well-developed  HENT:   Head: Normocephalic and atraumatic  Right Ear: External ear normal    Left Ear: External ear normal    Nose: Nose normal    Mouth/Throat: Oropharynx is clear and moist    Eyes: Pupils are equal, round, and reactive to light  Conjunctivae are normal    Neck: Normal range of motion  Neck supple  Cardiovascular: Normal rate, regular rhythm, normal heart sounds and intact distal pulses  Pulses are weak pulses  Pulses:       Dorsalis pedis pulses are 1+ on the right side, and 1+ on the left side  Pulmonary/Chest: Effort normal and breath sounds normal    Abdominal: Soft  Bowel sounds are normal    Musculoskeletal: Normal range of motion  He exhibits no edema  Feet:    Feet:   Right Foot:   Skin Integrity: Negative for ulcer, skin breakdown, erythema, warmth, callus or dry skin  Left Foot:   Skin Integrity: Negative for ulcer, skin breakdown, erythema, warmth, callus or dry skin  Neurological: He is alert  Patient's shoes and socks removed  Right Foot/Ankle   Right Foot Inspection  Skin Exam: skin normal and skin intact no dry skin, no warmth, no callus, no erythema, no maceration, no abnormal color, no pre-ulcer, no ulcer and no callus                            Sensory       Monofilament testing: absent  Vascular    The right DP pulse is 1+  Left Foot/Ankle  Left Foot Inspection  Skin Exam: skin normal and skin intactno dry skin, no warmth, no erythema, no maceration, normal color, no pre-ulcer, no ulcer and no callus                                         Sensory       Monofilament: absent  Vascular    The left DP pulse is 1+  Assign Risk Category:  No deformity present;  Loss of protective sensation; Weak pulses       Risk: 2

## 2019-08-05 ENCOUNTER — OFFICE VISIT (OUTPATIENT)
Dept: OBGYN CLINIC | Facility: HOSPITAL | Age: 79
End: 2019-08-05
Payer: COMMERCIAL

## 2019-08-05 VITALS
HEART RATE: 68 BPM | SYSTOLIC BLOOD PRESSURE: 172 MMHG | HEIGHT: 66 IN | BODY MASS INDEX: 30.53 KG/M2 | DIASTOLIC BLOOD PRESSURE: 75 MMHG | WEIGHT: 190 LBS

## 2019-08-05 DIAGNOSIS — M19.012 PRIMARY OSTEOARTHRITIS OF LEFT SHOULDER: Primary | ICD-10-CM

## 2019-08-05 PROCEDURE — 99213 OFFICE O/P EST LOW 20 MIN: CPT | Performed by: ORTHOPAEDIC SURGERY

## 2019-08-05 NOTE — PROGRESS NOTES
Assessment  Diagnoses and all orders for this visit:    Primary osteoarthritis of left shoulder        Discussion and Plan:    Patient and I reviewed his CT results today in office  CT does demonstrate some mild osteoarthritic changes which is likely age-related  His rotator cuff is intact  Due to this we can treat him conservatively and treat symptoms of his osteoarthritis  I am happy to see he is overall doing better than his last visit  I did explain the osteoarthritis can flare up  If he experiences a flare up he is free to be seen in the office where we can provide him with a corticosteroid injection  Follow up PRN    Subjective:   Patient ID: Silvia Dyer is a 66 y o  male      HPI  Patient is a 79-year-old male who presents the office today for a follow-up evaluation of his left shoulder  Patient was able to obtain CT prior to today's visit is here to review the results  He states his pain is overall gotten better in the last few weeks  Previously his pain is a 10/10  Today he states pain is a 3/10  Patient does ambulate with a walker  He states he has no trouble using the walker due to shoulder  He denies any new injury today  He denies and any numbness and tingling  The following portions of the patient's history were reviewed and updated as appropriate: allergies, current medications, past family history, past medical history, past social history, past surgical history and problem list     Review of Systems   Constitutional: Negative for chills, fever and unexpected weight change  HENT: Negative for hearing loss, nosebleeds and sore throat  Eyes: Negative for pain, redness and visual disturbance  Respiratory: Negative for cough, shortness of breath and wheezing  Cardiovascular: Negative for chest pain, palpitations and leg swelling  Gastrointestinal: Negative for abdominal pain, nausea and vomiting  Endocrine: Negative for polydipsia and polyuria     Genitourinary: Negative for dysuria and hematuria  Musculoskeletal: Negative for arthralgias, joint swelling and myalgias  Skin: Negative for rash and wound  Neurological: Negative for dizziness, numbness and headaches  Psychiatric/Behavioral: Negative for agitation, decreased concentration and suicidal ideas  Objective:  Left Shoulder Exam     Tenderness   The patient is experiencing no tenderness  Range of Motion   Active abduction: 90   External rotation: 40   Forward flexion: 160   Internal rotation 0 degrees: Sacrum     Other   Erythema: absent  Scars: absent  Sensation: normal  Pulse: present             Physical Exam   Constitutional: He is oriented to person, place, and time  He appears well-developed  HENT:   Head: Normocephalic and atraumatic  Eyes: Conjunctivae are normal    Neck: Neck supple  Cardiovascular: Intact distal pulses  Pulmonary/Chest: Effort normal    Neurological: He is alert and oriented to person, place, and time  Skin: Skin is warm and dry  Psychiatric: He has a normal mood and affect  His behavior is normal          I have personally reviewed pertinent films in PACS and my interpretation is as follows  CT arthrogram of left shoulder obtained on 07/31/2019 demonstrates intact rotator cuff with mild degenerative changes throughout the glenohumeral joint      Scribe Attestation    I,:   Gia Kam MA am acting as a scribe while in the presence of the attending physician :        I,:   Ida Zhou MD personally performed the services described in this documentation    as scribed in my presence :

## 2019-08-26 ENCOUNTER — TELEPHONE (OUTPATIENT)
Dept: FAMILY MEDICINE CLINIC | Facility: CLINIC | Age: 79
End: 2019-08-26

## 2019-08-26 DIAGNOSIS — R26.81 GAIT INSTABILITY: Primary | ICD-10-CM

## 2019-08-26 RX ORDER — CALCIUM CARBONATE 160(400)MG
TABLET,CHEWABLE ORAL DAILY
Qty: 1 EACH | Refills: 0 | Status: SHIPPED | OUTPATIENT
Start: 2019-08-26 | End: 2019-08-27 | Stop reason: SDUPTHER

## 2019-08-26 NOTE — TELEPHONE ENCOUNTER
Patient is requesting order for rollater walker  States he called [employer] and we need to contact insurance  Please let patient know

## 2019-08-27 RX ORDER — CALCIUM CARBONATE 160(400)MG
TABLET,CHEWABLE ORAL DAILY
Qty: 1 EACH | Refills: 0 | Status: SHIPPED | OUTPATIENT
Start: 2019-08-27

## 2019-08-28 NOTE — TELEPHONE ENCOUNTER
Faxed to [facility], insurance stated that he could go anywhere for the rollater he did not need to use a certain place

## 2019-08-30 ENCOUNTER — TELEPHONE (OUTPATIENT)
Dept: FAMILY MEDICINE CLINIC | Facility: CLINIC | Age: 79
End: 2019-08-30

## 2019-08-30 NOTE — TELEPHONE ENCOUNTER
Sent it to [employer] they have a medicare contract so hopefully they will be able to get it covered

## 2019-08-30 NOTE — TELEPHONE ENCOUNTER
Karey Zhang from [facility] called to say they received a script for a Rolator and the insurance will not cover it  Karey Zhang can be reached at [phone] any questions

## 2019-09-10 ENCOUNTER — APPOINTMENT (OUTPATIENT)
Dept: LAB | Facility: CLINIC | Age: 79
End: 2019-09-10
Payer: COMMERCIAL

## 2019-09-16 ENCOUNTER — APPOINTMENT (OUTPATIENT)
Dept: LAB | Facility: CLINIC | Age: 79
End: 2019-09-16
Payer: COMMERCIAL

## 2019-09-26 ENCOUNTER — APPOINTMENT (OUTPATIENT)
Dept: LAB | Facility: CLINIC | Age: 79
End: 2019-09-26
Payer: COMMERCIAL

## 2019-09-26 DIAGNOSIS — N18.30 CHRONIC KIDNEY DISEASE, STAGE 3 (HCC): ICD-10-CM

## 2019-09-26 LAB
ALBUMIN SERPL BCP-MCNC: 3.6 G/DL (ref 3.5–5)
ANION GAP SERPL CALCULATED.3IONS-SCNC: 5 MMOL/L (ref 4–13)
BACTERIA UR QL AUTO: ABNORMAL /HPF
BILIRUB UR QL STRIP: NEGATIVE
BUN SERPL-MCNC: 19 MG/DL (ref 5–25)
CALCIUM SERPL-MCNC: 9.1 MG/DL (ref 8.3–10.1)
CHLORIDE SERPL-SCNC: 109 MMOL/L (ref 100–108)
CLARITY UR: ABNORMAL
CO2 SERPL-SCNC: 27 MMOL/L (ref 21–32)
COLOR UR: YELLOW
CREAT SERPL-MCNC: 1.45 MG/DL (ref 0.6–1.3)
CREAT UR-MCNC: 112 MG/DL
ERYTHROCYTE [DISTWIDTH] IN BLOOD BY AUTOMATED COUNT: 14.6 % (ref 11.6–15.1)
GFR SERPL CREATININE-BSD FRML MDRD: 46 ML/MIN/1.73SQ M
GLUCOSE P FAST SERPL-MCNC: 180 MG/DL (ref 65–99)
GLUCOSE UR STRIP-MCNC: NEGATIVE MG/DL
HCT VFR BLD AUTO: 45.2 % (ref 36.5–49.3)
HGB BLD-MCNC: 14.9 G/DL (ref 12–17)
HGB UR QL STRIP.AUTO: ABNORMAL
HYALINE CASTS #/AREA URNS LPF: ABNORMAL /LPF
KETONES UR STRIP-MCNC: NEGATIVE MG/DL
LEUKOCYTE ESTERASE UR QL STRIP: NEGATIVE
MCH RBC QN AUTO: 27.7 PG (ref 26.8–34.3)
MCHC RBC AUTO-ENTMCNC: 33 G/DL (ref 31.4–37.4)
MCV RBC AUTO: 84 FL (ref 82–98)
NITRITE UR QL STRIP: NEGATIVE
NON-SQ EPI CELLS URNS QL MICRO: ABNORMAL /HPF
PH UR STRIP.AUTO: 5.5 [PH]
PHOSPHATE SERPL-MCNC: 2.7 MG/DL (ref 2.3–4.1)
PLATELET # BLD AUTO: 223 THOUSANDS/UL (ref 149–390)
PMV BLD AUTO: 11.3 FL (ref 8.9–12.7)
POTASSIUM SERPL-SCNC: 4.5 MMOL/L (ref 3.5–5.3)
PROT UR STRIP-MCNC: ABNORMAL MG/DL
PROT UR-MCNC: 83 MG/DL
PROT/CREAT UR: 0.74 MG/G{CREAT} (ref 0–0.1)
PTH-INTACT SERPL-MCNC: 41.4 PG/ML (ref 18.4–80.1)
RBC # BLD AUTO: 5.37 MILLION/UL (ref 3.88–5.62)
RBC #/AREA URNS AUTO: ABNORMAL /HPF
SODIUM SERPL-SCNC: 141 MMOL/L (ref 136–145)
SP GR UR STRIP.AUTO: 1.01 (ref 1–1.03)
UROBILINOGEN UR QL STRIP.AUTO: 0.2 E.U./DL
WBC # BLD AUTO: 7.93 THOUSAND/UL (ref 4.31–10.16)
WBC #/AREA URNS AUTO: ABNORMAL /HPF

## 2019-09-26 PROCEDURE — 85027 COMPLETE CBC AUTOMATED: CPT

## 2019-09-26 PROCEDURE — 80069 RENAL FUNCTION PANEL: CPT

## 2019-09-26 PROCEDURE — 82570 ASSAY OF URINE CREATININE: CPT

## 2019-09-26 PROCEDURE — 36415 COLL VENOUS BLD VENIPUNCTURE: CPT

## 2019-09-26 PROCEDURE — 81001 URINALYSIS AUTO W/SCOPE: CPT

## 2019-09-26 PROCEDURE — 83970 ASSAY OF PARATHORMONE: CPT

## 2019-09-26 PROCEDURE — 84156 ASSAY OF PROTEIN URINE: CPT

## 2019-10-07 ENCOUNTER — APPOINTMENT (OUTPATIENT)
Dept: LAB | Facility: CLINIC | Age: 79
End: 2019-10-07
Payer: COMMERCIAL

## 2019-10-07 DIAGNOSIS — Z79.4 TYPE 2 DIABETES MELLITUS WITH STAGE 3 CHRONIC KIDNEY DISEASE, WITH LONG-TERM CURRENT USE OF INSULIN (HCC): ICD-10-CM

## 2019-10-07 DIAGNOSIS — I10 ESSENTIAL HYPERTENSION: ICD-10-CM

## 2019-10-07 DIAGNOSIS — E11.22 TYPE 2 DIABETES MELLITUS WITH STAGE 3 CHRONIC KIDNEY DISEASE, WITH LONG-TERM CURRENT USE OF INSULIN (HCC): ICD-10-CM

## 2019-10-07 DIAGNOSIS — N18.30 TYPE 2 DIABETES MELLITUS WITH STAGE 3 CHRONIC KIDNEY DISEASE, WITH LONG-TERM CURRENT USE OF INSULIN (HCC): ICD-10-CM

## 2019-10-07 DIAGNOSIS — E78.5 HYPERLIPIDEMIA, UNSPECIFIED HYPERLIPIDEMIA TYPE: ICD-10-CM

## 2019-10-07 LAB
ALBUMIN SERPL BCP-MCNC: 3.7 G/DL (ref 3.5–5)
ALP SERPL-CCNC: 105 U/L (ref 46–116)
ALT SERPL W P-5'-P-CCNC: 31 U/L (ref 12–78)
ANION GAP SERPL CALCULATED.3IONS-SCNC: 3 MMOL/L (ref 4–13)
AST SERPL W P-5'-P-CCNC: 24 U/L (ref 5–45)
BILIRUB SERPL-MCNC: 1.06 MG/DL (ref 0.2–1)
BUN SERPL-MCNC: 16 MG/DL (ref 5–25)
CALCIUM SERPL-MCNC: 9.6 MG/DL (ref 8.3–10.1)
CHLORIDE SERPL-SCNC: 109 MMOL/L (ref 100–108)
CHOLEST SERPL-MCNC: 152 MG/DL (ref 50–200)
CO2 SERPL-SCNC: 30 MMOL/L (ref 21–32)
CREAT SERPL-MCNC: 1.3 MG/DL (ref 0.6–1.3)
EST. AVERAGE GLUCOSE BLD GHB EST-MCNC: 154 MG/DL
GFR SERPL CREATININE-BSD FRML MDRD: 52 ML/MIN/1.73SQ M
GLUCOSE P FAST SERPL-MCNC: 107 MG/DL (ref 65–99)
HBA1C MFR BLD: 7 % (ref 4.2–6.3)
HDLC SERPL-MCNC: 32 MG/DL (ref 40–60)
LDLC SERPL CALC-MCNC: 80 MG/DL (ref 0–100)
NONHDLC SERPL-MCNC: 120 MG/DL
POTASSIUM SERPL-SCNC: 4 MMOL/L (ref 3.5–5.3)
PROT SERPL-MCNC: 7.3 G/DL (ref 6.4–8.2)
SODIUM SERPL-SCNC: 142 MMOL/L (ref 136–145)
TRIGL SERPL-MCNC: 200 MG/DL

## 2019-10-07 PROCEDURE — 80061 LIPID PANEL: CPT

## 2019-10-07 PROCEDURE — 80053 COMPREHEN METABOLIC PANEL: CPT

## 2019-10-07 PROCEDURE — 83036 HEMOGLOBIN GLYCOSYLATED A1C: CPT

## 2019-10-10 DIAGNOSIS — E78.2 MIXED HYPERLIPIDEMIA: ICD-10-CM

## 2019-10-10 RX ORDER — ATORVASTATIN CALCIUM 20 MG/1
TABLET, FILM COATED ORAL
Qty: 90 TABLET | Refills: 1 | Status: SHIPPED | OUTPATIENT
Start: 2019-10-10 | End: 2019-12-23 | Stop reason: SDUPTHER

## 2019-10-15 ENCOUNTER — OFFICE VISIT (OUTPATIENT)
Dept: FAMILY MEDICINE CLINIC | Facility: CLINIC | Age: 79
End: 2019-10-15
Payer: COMMERCIAL

## 2019-10-15 ENCOUNTER — APPOINTMENT (OUTPATIENT)
Dept: LAB | Facility: CLINIC | Age: 79
End: 2019-10-15
Payer: COMMERCIAL

## 2019-10-15 VITALS
WEIGHT: 194.6 LBS | OXYGEN SATURATION: 97 % | HEART RATE: 84 BPM | BODY MASS INDEX: 33.22 KG/M2 | HEIGHT: 64 IN | RESPIRATION RATE: 16 BRPM | SYSTOLIC BLOOD PRESSURE: 130 MMHG | DIASTOLIC BLOOD PRESSURE: 68 MMHG | TEMPERATURE: 98.9 F

## 2019-10-15 DIAGNOSIS — E11.22 TYPE 2 DIABETES MELLITUS WITH STAGE 3 CHRONIC KIDNEY DISEASE, WITH LONG-TERM CURRENT USE OF INSULIN (HCC): ICD-10-CM

## 2019-10-15 DIAGNOSIS — Z01.818 PREOP EXAMINATION: Primary | ICD-10-CM

## 2019-10-15 DIAGNOSIS — I10 ESSENTIAL HYPERTENSION: ICD-10-CM

## 2019-10-15 DIAGNOSIS — I48.91 ATRIAL FIBRILLATION, UNSPECIFIED TYPE (HCC): ICD-10-CM

## 2019-10-15 DIAGNOSIS — H26.9 CATARACT OF BOTH EYES, UNSPECIFIED CATARACT TYPE: ICD-10-CM

## 2019-10-15 DIAGNOSIS — Z79.4 TYPE 2 DIABETES MELLITUS WITH STAGE 3 CHRONIC KIDNEY DISEASE, WITH LONG-TERM CURRENT USE OF INSULIN (HCC): ICD-10-CM

## 2019-10-15 DIAGNOSIS — N18.30 TYPE 2 DIABETES MELLITUS WITH STAGE 3 CHRONIC KIDNEY DISEASE, WITH LONG-TERM CURRENT USE OF INSULIN (HCC): ICD-10-CM

## 2019-10-15 PROCEDURE — 99214 OFFICE O/P EST MOD 30 MIN: CPT | Performed by: FAMILY MEDICINE

## 2019-10-15 PROCEDURE — 3078F DIAST BP <80 MM HG: CPT | Performed by: FAMILY MEDICINE

## 2019-10-15 PROCEDURE — 93000 ELECTROCARDIOGRAM COMPLETE: CPT | Performed by: FAMILY MEDICINE

## 2019-10-15 PROCEDURE — 3075F SYST BP GE 130 - 139MM HG: CPT | Performed by: FAMILY MEDICINE

## 2019-10-15 NOTE — PROGRESS NOTES
Chief Complaint   Patient presents with    Pre-op Clearance     Left eye cataract surgery 10/21/19 and right eye 11/11/19 with Dr Maria Ines Albarado  Assessment/Plan:    Type 2 diabetes mellitus (HCC)    Lab Results   Component Value Date    HGBA1C 7 0 (H) 10/07/2019     Stable  Low carb diet  Continue metformin 500mg bid  Obstructive sleep apnea  Cannot tolerate CPAP per pt  Cerebrovascular accident [facility])  CVA in 2017  Right leg weakness  Walks with walker  Essential hypertension  Controlled  DASH diet  Continue amlodipine 5mg QD and metoprolol 50mg bid  Atrial fibrillation ([address] )  He is on coumadin  Cardiology follows PT/INR  Tachy-brice syndrome ([address] )  Has pacemaker  FU cardiology regularly  Hyperlipidemia  Low fat diet  Continue atorvastatin 20mg qhs  Reviewed PAT's done in 10/2019  Lipid 152/200/32/80 ok  HgA1C 7 0 improved  CMP stable  EKG today showed afib and RBBB which are not new  Pt will stop coumadin 1 week before his surgery per cardiology  Pt has low risk for cataract surgery  Will send clearance to Dr Maria Ines Albarado  Diagnoses and all orders for this visit:    Preop examination  -     POCT ECG    Cataract of both eyes, unspecified cataract type    Essential hypertension    Atrial fibrillation, unspecified type ([address] )    Type 2 diabetes mellitus with stage 3 chronic kidney disease, with long-term current use of insulin (HCC)          Subjective:      Patient ID: Basia Dugan is a 78 y o  male  HPI    Pt is here by himself  Plan to do cataract surgery left 10/21/2019 and right 11/11/2019 by Dr Maria Ines Albarado  Surgical Risk Assessment:   Prior Anesthesia: Patient had prior anesthesia, no prior adverse reaction to edidural anesthesia, no prior adverse reaction to spinal anesthesia and no prior adverse reaction to general anesthesia   Pertinent Past Medical History: Diabetes using insulin, no CAD, had pacemaker, no chronic liver disease, no coagulation delay, no pulmonary embolism, no DVT, no thyroid disease, no seizure disorder, CVA in 2017, no asthma and no COPD  Exercise Capacity: able to walk four blocks without symptoms and able to walk two flights of stairs without symptoms  Lifestyle Factors: denies alcohol use, denies tobacco use and denies illegal drug use  Symptoms: no frequent nosebleeds, no chest pain, no cough, no dyspnea, no edema, no palpitations and no wheezing  Living Situation: home is secure and daughters will take care of him  HTN----He is on metoprolol 50mg bid and amlodipine 5mg QD  BP at home 120/70 per pt  Hx of CVA in 3/2017  Right leg feels weakness  Use walker everyday  Afib---He is on coumadin  Cardiology follows PT/INR  Will stop coumadin tomorrow per cardiology  S/p frankie 11/2017  [facility]ology [facility] regularly  Cardiology follows PT/INR    DWIGHT---He does not use CPAP       DM---for more than 10 years  10/2019 hgA1C 7 0 controlled  He is on meformin 500mg bid and novolin 32u bid  Denies neuropathy  Fu opthalmology Dr Benjie Bustillo regularly    FU podiatry Q 3 months       CKD3---FU nephrology Q 6 month       Hyperlipidemia---on atorvastatin 20mg qhs  Denies side effects          The following portions of the patient's history were reviewed and updated as appropriate: allergies, current medications, past family history, past medical history, past social history, past surgical history and problem list     Review of Systems   Constitutional: Negative for appetite change, chills and fever  HENT: Negative for congestion, ear pain, sinus pain and sore throat  Eyes: Negative for discharge and itching  Respiratory: Negative for apnea, cough, chest tightness, shortness of breath and wheezing  Cardiovascular: Negative for chest pain, palpitations and leg swelling  Gastrointestinal: Negative for abdominal pain, anal bleeding, constipation, diarrhea, nausea and vomiting     Endocrine: Negative for cold intolerance, heat intolerance and polyuria  Genitourinary: Negative for difficulty urinating and dysuria  Musculoskeletal: Negative for arthralgias, back pain and myalgias  Skin: Negative for rash  Neurological: Negative for dizziness and headaches  Psychiatric/Behavioral: Negative for agitation  Objective:      /68 (BP Location: Left arm, Patient Position: Sitting, Cuff Size: Adult)   Pulse 84   Temp 98 9 °F (37 2 °C) (Tympanic)   Resp 16   Ht 5' 3 5" (1 613 m)   Wt 88 3 kg (194 lb 9 6 oz)   SpO2 97%   BMI 33 93 kg/m²          Physical Exam   Constitutional: He appears well-developed  HENT:   Head: Normocephalic and atraumatic  Right Ear: External ear normal    Left Ear: External ear normal    Nose: Nose normal    Mouth/Throat: Oropharynx is clear and moist    Eyes: Pupils are equal, round, and reactive to light  Conjunctivae are normal  Right eye exhibits no discharge  Left eye exhibits no discharge  Neck: Normal range of motion  Neck supple  Cardiovascular: Normal rate, regular rhythm, normal heart sounds and intact distal pulses  Pulmonary/Chest: Effort normal and breath sounds normal    Abdominal: Soft  Bowel sounds are normal    Musculoskeletal: He exhibits no edema  Use walker   Neurological: He is alert

## 2019-10-15 NOTE — ASSESSMENT & PLAN NOTE
Lab Results   Component Value Date    HGBA1C 7 0 (H) 10/07/2019     Stable  Low carb diet  Continue metformin 500mg bid

## 2019-10-25 ENCOUNTER — OFFICE VISIT (OUTPATIENT)
Dept: NEPHROLOGY | Facility: CLINIC | Age: 79
End: 2019-10-25
Payer: COMMERCIAL

## 2019-10-25 VITALS
SYSTOLIC BLOOD PRESSURE: 128 MMHG | HEIGHT: 66 IN | WEIGHT: 195.2 LBS | DIASTOLIC BLOOD PRESSURE: 78 MMHG | BODY MASS INDEX: 31.37 KG/M2 | HEART RATE: 70 BPM

## 2019-10-25 DIAGNOSIS — E66.9 OBESITY (BMI 30-39.9): ICD-10-CM

## 2019-10-25 DIAGNOSIS — I10 ESSENTIAL HYPERTENSION: ICD-10-CM

## 2019-10-25 DIAGNOSIS — N20.0 NEPHROLITHIASIS: ICD-10-CM

## 2019-10-25 DIAGNOSIS — N18.30 CHRONIC KIDNEY DISEASE, STAGE 3 (HCC): ICD-10-CM

## 2019-10-25 DIAGNOSIS — N18.30 TYPE 2 DIABETES MELLITUS WITH STAGE 3 CHRONIC KIDNEY DISEASE, WITH LONG-TERM CURRENT USE OF INSULIN (HCC): Primary | ICD-10-CM

## 2019-10-25 DIAGNOSIS — E11.22 TYPE 2 DIABETES MELLITUS WITH STAGE 3 CHRONIC KIDNEY DISEASE, WITH LONG-TERM CURRENT USE OF INSULIN (HCC): Primary | ICD-10-CM

## 2019-10-25 DIAGNOSIS — Z79.4 TYPE 2 DIABETES MELLITUS WITH STAGE 3 CHRONIC KIDNEY DISEASE, WITH LONG-TERM CURRENT USE OF INSULIN (HCC): Primary | ICD-10-CM

## 2019-10-25 PROCEDURE — 99214 OFFICE O/P EST MOD 30 MIN: CPT | Performed by: INTERNAL MEDICINE

## 2019-10-25 NOTE — PATIENT INSTRUCTIONS
I would like to see you back in the office in 1 year with repeat labs  Follow a low-salt diet of less than 2 g of salt in a day  Keep having a good blood sugar control goal to have a hemoglobin A1c close or below 7%  Avoid NSAIDs (no ibuprofen, Motrin, Advil, Aleve, naproxen)  Okay to take Tylenol or paracetamol or acetaminophen as needed for pain  Stay well-hydrated to prevent kidney stones

## 2019-10-25 NOTE — PROGRESS NOTES
OFFICE FOLLOW UP - Nephrology   Diane Bustamante 78 y o  male MRN: [id number]       ASSESSMENT and PLAN:  Karine Morel was seen today for follow-up and chronic kidney disease  Diagnoses and all orders for this visit:    Type 2 diabetes mellitus with stage 3 chronic kidney disease, with long-term current use of insulin ([facility])  -     CBC; Future  -     Protein / creatinine ratio, urine; Future  -     Renal function panel; Future  -     PTH, intact; Future    Chronic kidney disease, stage 3 (HCC)    Obesity (BMI 30-39  9)    Nephrolithiasis    Essential hypertension        This is a 77-year-old gentleman with known history of chronic kidney disease stage 3 in the setting of diabetes, hypertension, previous episode of obstructive uropathy in 2013, hyperlipidemia who returns to the office for follow-up  1  Chronic kidney disease stage 3 baseline creatinine around 1 2-1 5  Renal function fairly stable with a most recent creatinine of 1 3 with an estimated GFR of 52 with minimal proteinuria, most recent UPC 0 7  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, hypertensive nephrosclerosis and possible atherosclerotic disease as well as age-related nephron most     His kidney function remains fairly stable, would like to see him back in 1 year with repeat labs  Discussed about importance of having good blood sugar control as well as avoiding NSAIDs to slow down the progression of his kidney disease  2  Insulin-dependent diabetes, continue management by his family doctor  Goal to have an A1c less than 7%  3  Hypertension, blood pressure acceptable, no changes on his medications at this moment  4  History of nephrolithiasis with episode of obstructive uropathy requiring bilateral percutaneous nephrostomy tubes in 2013  He has not passed any kidney stones in several years, continue to to follow a low-salt diet and drink at least 2 L of water daily      Also advised about restart care with urologist for yearly follow-up  5  Mineral bone disease, PTH between normal limits, most recent 41       6  Hemoglobin normal, most recent 14 9     7   Tachy-brice syndrome status post pacemaker placement, continue follow-up with cardiologist at [facility]       Patient Instructions   I would like to see you back in the office in 1 year with repeat labs  Follow a low-salt diet of less than 2 g of salt in a day  Keep having a good blood sugar control goal to have a hemoglobin A1c close or below 7%  Avoid NSAIDs (no ibuprofen, Motrin, Advil, Aleve, naproxen)  Okay to take Tylenol or paracetamol or acetaminophen as needed for pain  Stay well-hydrated to prevent kidney stones  HPI: Inocencio Pastor is a 78 y o  male who is here for Follow-up and Chronic Kidney Disease    Last time seen was in October 2018, today he returns for year follow-up  Since our last visit, there has been no ER visits or hospitilizations  Currently feeling okay, denies any complaints  Denies any urinary problems, no burning sensation, no gross hematuria, denies passing any kidney stones in several years  Appetite is stable  Patient denies any chest pain, shortness of breath and swelling  The last blood work was done on 10/07, which we have reviewed together  Denies taking any NSAIDs  Denies tobacco use  ROS:   All the systems were reviewed and were negative except as documented on the HPI      Allergies: Flu virus vaccine and Haemophilus influenzae    Medications:   Current Outpatient Medications:     amLODIPine (NORVASC) 5 mg tablet, TAKE 1 TABLET (5 MG TOTAL) BY MOUTH DAILY FOR 90 DAYS, Disp: 90 tablet, Rfl: 1    atorvastatin (LIPITOR) 20 mg tablet, TAKE 1 TABLET BY MOUTH EVERY DAY, Disp: 90 tablet, Rfl: 1    cyanocobalamin 1000 MCG tablet, Take 1 tablet by mouth daily, Disp: 30 tablet, Rfl: 3    insulin aspart (NOVOLOG FLEXPEN) 100 Units/mL injection pen, Novolog Flexpen U-100 Insulin  2-8 units daily, Disp: , Rfl:     Misc  Devices (ROLLATOR ULTRA-LIGHT) MISC, by Does not apply route daily, Disp: 1 each, Rfl: 0    NOVOLIN N 100 UNIT/ML subcutaneous injection, 12 units in the morning and 22 units in the evening, Disp: 10 mL, Rfl: 0    warfarin (COUMADIN) 5 mg tablet, Take 5 mg by mouth daily , Disp: , Rfl:     metFORMIN (GLUCOPHAGE) 500 mg tablet, TAKE 1 TABLET (500 MG TOTAL) BY MOUTH EVERY 12 (TWELVE) HOURS FOR 30 DAYS, Disp: 180 tablet, Rfl: 1    metoprolol tartrate (LOPRESSOR) 50 mg tablet, Take 1 tablet by mouth every 12 (twelve) hours (Patient not taking: Reported on 10/25/2019), Disp: 60 tablet, Rfl: 3    Past Medical History:   Diagnosis Date    Arthritis     Carotid stenosis     Coronary artery disease     Last Assessed:  11/5/13    CVA [facility]re[facility] vascular [facility])     Last Assessed:  1/10/18    Diabetes mellitus ([address] )     Controlled with kidney complication, Last Assessed:  1/10/18    Hematuria     Hyperlipidemia     Hypertension     Kidney stone     Renal disorder     Trigger finger     Vitamin D deficiency      Past Surgical History:   Procedure Laterality Date    CARDIAC SURGERY      CIRCUMCISION      Resolved:  1964, Elective    COLONOSCOPY      Resolved:  2006    CORONARY ARTERY BYPASS GRAFT  2000    6 vesels    CYSTOSCOPY      Resolved:  4/9/10, Diagnostic    CYSTOSCOPY  12/14/2018    Dr Dianelys Ashford [place] INJECTION LEFT SHOULDER (ARTHROGRAM)  7/31/2019    FRACTURE SURGERY      left forearm fx with plate repair      HUMERUS FRACTURE SURGERY      Open, Greater tuberosity    LITHOTRIPSY  09/12/2013    Renal    NEPHROSTOMY Bilateral     With drainage    VASECTOMY       Family History   Problem Relation Age of Onset    Heart attack Mother     Coronary artery disease Mother     Emphysema Father     Cancer Brother         Leukemia    Hypertension Son     Diabetes Maternal Grandfather     Hypertension Other     Other Other Cerebrovascular accident (CVA)      reports that he has quit smoking  His smoking use included cigarettes  He has a 60 00 pack-year smoking history  He has never used smokeless tobacco  He reports that he does not drink alcohol or use drugs  Physical Exam:   Vitals:    10/25/19 1332 10/25/19 1348   BP:  128/78   BP Location:  Right arm   Patient Position:  Sitting   Pulse:  70   Weight: 88 5 kg (195 lb 3 2 oz)    Height: 5' 6" (1 676 m)      Body mass index is 31 51 kg/m²  General: cooperative, in not acute distress  Eyes: conjunctivae pink, anicteric sclerae  ENT: lips and mucous membranes moist  Neck: supple, no JVD  Chest: clear breath sounds bilateral, no crackles, ronchus or wheezings  CVS: distinct S1 & S2, normal rate, regular rhythm  Abdomen: soft, non-tender, non-distended, normoactive bowel sounds  Back: no CVA tenderness  Extremities: no edema of both legs  Skin: no rash  Neuro: awake, alert, oriented      Laboratory Results:  Lab Results   Component Value Date    WBC 7 93 09/26/2019    HGB 14 9 09/26/2019    HCT 45 2 09/26/2019    MCV 84 09/26/2019     09/26/2019     Lab Results   Component Value Date    SODIUM 142 10/07/2019    K 4 0 10/07/2019     (H) 10/07/2019    CO2 30 10/07/2019    BUN 16 10/07/2019    CREATININE 1 30 10/07/2019    GLUC 112 04/08/2019    CALCIUM 9 6 10/07/2019       Lab Results   Component Value Date    PTH 41 4 09/26/2019    CALCIUM 9 6 10/07/2019    PHOS 2 7 09/26/2019           Portions of the record may have been created with voice recognition software  Occasional wrong word or "sound a like" substitutions may have occurred due to the inherent limitations of voice recognition software  Read the chart carefully and recognize, using context, where substitutions have occurred  If you have any questions, please contact the dictating provider

## 2019-10-28 ENCOUNTER — APPOINTMENT (OUTPATIENT)
Dept: LAB | Facility: CLINIC | Age: 79
End: 2019-10-28
Payer: COMMERCIAL

## 2019-11-04 ENCOUNTER — APPOINTMENT (OUTPATIENT)
Dept: LAB | Facility: CLINIC | Age: 79
End: 2019-11-04
Payer: COMMERCIAL

## 2019-11-06 ENCOUNTER — TELEPHONE (OUTPATIENT)
Dept: NEPHROLOGY | Facility: CLINIC | Age: 79
End: 2019-11-06

## 2019-11-06 ENCOUNTER — HOSPITAL ENCOUNTER (EMERGENCY)
Facility: HOSPITAL | Age: 79
Discharge: HOME | End: 2019-11-06
Attending: EMERGENCY MEDICINE | Admitting: EMERGENCY MEDICINE
Payer: COMMERCIAL

## 2019-11-06 ENCOUNTER — APPOINTMENT (EMERGENCY)
Dept: RADIOLOGY | Facility: HOSPITAL | Age: 79
End: 2019-11-06
Payer: COMMERCIAL

## 2019-11-06 VITALS
SYSTOLIC BLOOD PRESSURE: 172 MMHG | WEIGHT: 194.89 LBS | TEMPERATURE: 97.8 F | DIASTOLIC BLOOD PRESSURE: 77 MMHG | HEART RATE: 67 BPM | RESPIRATION RATE: 18 BRPM | BODY MASS INDEX: 31.46 KG/M2 | OXYGEN SATURATION: 95 %

## 2019-11-06 DIAGNOSIS — R10.9 FLANK PAIN: ICD-10-CM

## 2019-11-06 DIAGNOSIS — N39.0 UTI (URINARY TRACT INFECTION): Primary | ICD-10-CM

## 2019-11-06 DIAGNOSIS — N20.0 NEPHROLITHIASIS: ICD-10-CM

## 2019-11-06 LAB
ALBUMIN SERPL BCP-MCNC: 3.6 G/DL (ref 3.5–5)
ALP SERPL-CCNC: 115 U/L (ref 46–116)
ALT SERPL W P-5'-P-CCNC: 39 U/L (ref 12–78)
ANION GAP SERPL CALCULATED.3IONS-SCNC: 7 MMOL/L (ref 4–13)
AST SERPL W P-5'-P-CCNC: 32 U/L (ref 5–45)
BACTERIA UR QL AUTO: ABNORMAL /HPF
BASOPHILS # BLD AUTO: 0.02 THOUSANDS/ΜL (ref 0–0.1)
BASOPHILS NFR BLD AUTO: 0 % (ref 0–1)
BILIRUB SERPL-MCNC: 0.62 MG/DL (ref 0.2–1)
BILIRUB UR QL STRIP: NEGATIVE
BUN SERPL-MCNC: 20 MG/DL (ref 5–25)
CALCIUM SERPL-MCNC: 8.9 MG/DL (ref 8.3–10.1)
CAOX CRY URNS QL MICRO: ABNORMAL /HPF
CHLORIDE SERPL-SCNC: 108 MMOL/L (ref 100–108)
CLARITY UR: ABNORMAL
CO2 SERPL-SCNC: 25 MMOL/L (ref 21–32)
COLOR UR: ABNORMAL
CREAT SERPL-MCNC: 1.3 MG/DL (ref 0.6–1.3)
EOSINOPHIL # BLD AUTO: 0.09 THOUSAND/ΜL (ref 0–0.61)
EOSINOPHIL NFR BLD AUTO: 1 % (ref 0–6)
ERYTHROCYTE [DISTWIDTH] IN BLOOD BY AUTOMATED COUNT: 14.3 % (ref 11.6–15.1)
GFR SERPL CREATININE-BSD FRML MDRD: 52 ML/MIN/1.73SQ M
GLUCOSE SERPL-MCNC: 165 MG/DL (ref 65–140)
GLUCOSE UR STRIP-MCNC: NEGATIVE MG/DL
HCT VFR BLD AUTO: 44.4 % (ref 36.5–49.3)
HGB BLD-MCNC: 15 G/DL (ref 12–17)
HGB UR QL STRIP.AUTO: ABNORMAL
IMM GRANULOCYTES # BLD AUTO: 0.02 THOUSAND/UL (ref 0–0.2)
IMM GRANULOCYTES NFR BLD AUTO: 0 % (ref 0–2)
KETONES UR STRIP-MCNC: NEGATIVE MG/DL
LEUKOCYTE ESTERASE UR QL STRIP: NEGATIVE
LIPASE SERPL-CCNC: 49 U/L (ref 73–393)
LYMPHOCYTES # BLD AUTO: 1.53 THOUSANDS/ΜL (ref 0.6–4.47)
LYMPHOCYTES NFR BLD AUTO: 20 % (ref 14–44)
MCH RBC QN AUTO: 28 PG (ref 26.8–34.3)
MCHC RBC AUTO-ENTMCNC: 33.8 G/DL (ref 31.4–37.4)
MCV RBC AUTO: 83 FL (ref 82–98)
MONOCYTES # BLD AUTO: 0.7 THOUSAND/ΜL (ref 0.17–1.22)
MONOCYTES NFR BLD AUTO: 9 % (ref 4–12)
MUCOUS THREADS UR QL AUTO: ABNORMAL
NEUTROPHILS # BLD AUTO: 5.33 THOUSANDS/ΜL (ref 1.85–7.62)
NEUTS SEG NFR BLD AUTO: 70 % (ref 43–75)
NITRITE UR QL STRIP: NEGATIVE
NON-SQ EPI CELLS URNS QL MICRO: ABNORMAL /HPF
NRBC BLD AUTO-RTO: 0 /100 WBCS
PH UR STRIP.AUTO: 5 [PH] (ref 4.5–8)
PLATELET # BLD AUTO: 224 THOUSANDS/UL (ref 149–390)
PMV BLD AUTO: 10.1 FL (ref 8.9–12.7)
POTASSIUM SERPL-SCNC: 3.8 MMOL/L (ref 3.5–5.3)
PROT SERPL-MCNC: 6.9 G/DL (ref 6.4–8.2)
PROT UR STRIP-MCNC: >=300 MG/DL
RBC # BLD AUTO: 5.35 MILLION/UL (ref 3.88–5.62)
RBC #/AREA URNS AUTO: ABNORMAL /HPF
SODIUM SERPL-SCNC: 140 MMOL/L (ref 136–145)
SP GR UR STRIP.AUTO: 1.02 (ref 1–1.03)
UROBILINOGEN UR QL STRIP.AUTO: 0.2 E.U./DL
WBC # BLD AUTO: 7.69 THOUSAND/UL (ref 4.31–10.16)
WBC #/AREA URNS AUTO: ABNORMAL /HPF

## 2019-11-06 PROCEDURE — 36415 COLL VENOUS BLD VENIPUNCTURE: CPT | Performed by: EMERGENCY MEDICINE

## 2019-11-06 PROCEDURE — 85025 COMPLETE CBC W/AUTO DIFF WBC: CPT | Performed by: EMERGENCY MEDICINE

## 2019-11-06 PROCEDURE — 74177 CT ABD & PELVIS W/CONTRAST: CPT

## 2019-11-06 PROCEDURE — 81001 URINALYSIS AUTO W/SCOPE: CPT

## 2019-11-06 PROCEDURE — 99284 EMERGENCY DEPT VISIT MOD MDM: CPT | Performed by: EMERGENCY MEDICINE

## 2019-11-06 PROCEDURE — 83690 ASSAY OF LIPASE: CPT | Performed by: EMERGENCY MEDICINE

## 2019-11-06 PROCEDURE — 99284 EMERGENCY DEPT VISIT MOD MDM: CPT

## 2019-11-06 PROCEDURE — 80053 COMPREHEN METABOLIC PANEL: CPT | Performed by: EMERGENCY MEDICINE

## 2019-11-06 RX ORDER — CEPHALEXIN 250 MG/1
500 CAPSULE ORAL ONCE
Status: COMPLETED | OUTPATIENT
Start: 2019-11-06 | End: 2019-11-06

## 2019-11-06 RX ORDER — CEPHALEXIN 500 MG/1
500 CAPSULE ORAL 4 TIMES DAILY
Qty: 40 CAPSULE | Refills: 0 | Status: SHIPPED | OUTPATIENT
Start: 2019-11-06 | End: 2019-11-16

## 2019-11-06 RX ADMIN — IODIXANOL 100 ML: 320 INJECTION, SOLUTION INTRAVASCULAR at 17:08

## 2019-11-06 RX ADMIN — CEPHALEXIN 500 MG: 250 CAPSULE ORAL at 17:40

## 2019-11-06 NOTE — ED NOTES
Pt had stated to this RN that he was unable to provide urine sample while in ED and he brought a sample from home  RN asked pt to discard sample and provide clean catch  Pt refused  RN then witnessed pt pour his sample from home into sterile cup given in ED  Pt then provided this sample to another RN for testing after refusing to discard        Yaya Dempsey RN  11/06/19 7338

## 2019-11-06 NOTE — TELEPHONE ENCOUNTER
I have called and spoke with patient, complaining of severe pain, he states that his urine is clear  Advised to go to the ER for evaluation, he may need imaging studies given his history of nephrolithiasis  Patient states will go to [facility] ER        I am cc note to patient's PCP to keep him updated

## 2019-11-06 NOTE — DISCHARGE INSTRUCTIONS
Follow-up with Urology as needed for your known kidney stones as well as your urinary tract infection  Return to the emergency department with any worsening symptoms including worsening pain nausea vomiting or fevers

## 2019-11-06 NOTE — ED PROVIDER NOTES
History  Chief Complaint   Patient presents with    Back Pain     Left flank  Started 1 month ago but became acute this am   States pain is an 10/10  History of kidney problems  Denies any urinary symptoms  66-year-old male with history of CKD CAD and kidney stones presenting with left flank pain that is been ongoing for the past week  It acutely worsened this morning and radiates down his left side he denies any associated nausea vomiting or urinary symptoms he denies any blood in his urine or stool he denies any diarrhea constipation no fevers or chills at home he denies any history of abdominal surgeries however has had kidney stones in the past requiring stenting  History provided by:  Patient   used: No    Back Pain   Location:  Lumbar spine  Quality:  Aching  Radiates to:  Does not radiate  Pain severity:  Moderate  Pain is:  Unable to specify  Onset quality:  Sudden  Timing:  Constant  Progression:  Unchanged  Chronicity:  New  Relieved by:  Nothing  Worsened by:  Ambulation and palpation  Ineffective treatments:  NSAIDs  Associated symptoms: abdominal pain    Associated symptoms: no chest pain, no dysuria, no fever, no headaches and no weakness    Risk factors: obesity        Prior to Admission Medications   Prescriptions Last Dose Informant Patient Reported? Taking? Misc   Devices (ROLLATOR ULTRA-LIGHT) MISC  Self No No   Sig: by Does not apply route daily   NOVOLIN N 100 UNIT/ML subcutaneous injection  Self No No   Si units in the morning and 22 units in the evening   amLODIPine (NORVASC) 5 mg tablet  Self No No   Sig: TAKE 1 TABLET (5 MG TOTAL) BY MOUTH DAILY FOR 90 DAYS   atorvastatin (LIPITOR) 20 mg tablet  Self No No   Sig: TAKE 1 TABLET BY MOUTH EVERY DAY   cyanocobalamin 1000 MCG tablet  Self No No   Sig: Take 1 tablet by mouth daily   insulin aspart (NOVOLOG FLEXPEN) 100 Units/mL injection pen  Self Yes No   Sig: Novolog Flexpen U-100 Insulin   2-8 units daily metFORMIN (GLUCOPHAGE) 500 mg tablet  Self No No   Sig: TAKE 1 TABLET (500 MG TOTAL) BY MOUTH EVERY 12 (TWELVE) HOURS FOR 30 DAYS   metoprolol tartrate (LOPRESSOR) 50 mg tablet  Self No No   Sig: Take 1 tablet by mouth every 12 (twelve) hours   Patient not taking: Reported on 10/25/2019   warfarin (COUMADIN) 5 mg tablet  Self Yes No   Sig: Take 5 mg by mouth daily       Facility-Administered Medications: None       Past Medical History:   Diagnosis Date    Arthritis     Carotid stenosis     Coronary artery disease     Last Assessed:  11/5/13    CVA [facility])     Last Assessed:  1/10/18    Diabetes mellitus ([facility] [address] )     Controlled with kidney complication, Last Assessed:  1/10/18    Hematuria     Hyperlipidemia     Hypertension     Kidney stone     Renal disorder     Trigger finger     Vitamin D deficiency        Past Surgical History:   Procedure Laterality Date    CARDIAC SURGERY      CIRCUMCISION      Resolved:  1964, Elective    COLONOSCOPY      Resolved:  2006    CORONARY ARTERY BYPASS GRAFT  2000    6 vesels    CYSTOSCOPY      Resolved:  4/9/10, Diagnostic    CYSTOSCOPY  12/14/2018    Dr Trish Corey [place] INJECTION LEFT SHOULDER (ARTHROGRAM)  7/31/2019    FRACTURE SURGERY      left forearm fx with plate repair   HUMERUS FRACTURE SURGERY      Open, Greater tuberosity    LITHOTRIPSY  09/12/2013    Renal    NEPHROSTOMY Bilateral     With drainage    VASECTOMY         Family History   Problem Relation Age of Onset    Heart attack Mother     Coronary artery disease Mother     Emphysema Father     Cancer Brother         Leukemia    Hypertension Son     Diabetes Maternal Grandfather     Hypertension Other     Other Other         Cerebrovascular accident (CVA)     I have reviewed and agree with the history as documented      Social History     Tobacco Use    Smoking status: Former Smoker     Packs/day: 1 50     Years: 40 00     Pack years: 60 00     Types: Cigarettes    Smokeless tobacco: Never Used    Tobacco comment: quit 30 years ago  Substance Use Topics    Alcohol use: No    Drug use: No        Review of Systems   Constitutional: Negative for chills, fatigue and fever  HENT: Negative for sore throat  Eyes: Negative for visual disturbance  Respiratory: Negative for shortness of breath  Cardiovascular: Negative for chest pain  Gastrointestinal: Positive for abdominal pain  Negative for constipation, diarrhea, nausea and vomiting  Genitourinary: Negative for difficulty urinating, dysuria and hematuria  Musculoskeletal: Positive for back pain  Negative for arthralgias  Skin: Negative for rash  Neurological: Negative for syncope, weakness and headaches  Hematological: Negative for adenopathy  Psychiatric/Behavioral: Negative for agitation and behavioral problems  All other systems reviewed and are negative  Physical Exam  ED Triage Vitals   Temperature Pulse Respirations Blood Pressure SpO2   11/06/19 1348 11/06/19 1348 11/06/19 1348 11/06/19 1348 11/06/19 1348   97 8 °F (36 6 °C) 82 20 (!) 193/79 98 %      Temp Source Heart Rate Source Patient Position - Orthostatic VS BP Location FiO2 (%)   11/06/19 1348 11/06/19 1348 11/06/19 1348 11/06/19 1348 --   Oral Monitor Sitting Left arm       Pain Score       11/06/19 1645       2             Orthostatic Vital Signs  Vitals:    11/06/19 1348 11/06/19 1401 11/06/19 1548 11/06/19 1645   BP: (!) 193/79 148/67 (!) 192/84 (!) 172/77   Pulse: 82 70 67 67   Patient Position - Orthostatic VS: Sitting Sitting  Sitting       Physical Exam   Constitutional: He is oriented to person, place, and time  He appears well-developed and well-nourished  HENT:   Head: Normocephalic and atraumatic  Eyes: Conjunctivae and EOM are normal  No scleral icterus  Neck: Normal range of motion  Neck supple  Cardiovascular: Normal rate and regular rhythm     No murmur heard   Pulmonary/Chest: Effort normal and breath sounds normal    Abdominal: Soft  Bowel sounds are normal  There is tenderness  Musculoskeletal: Normal range of motion  Neurological: He is alert and oriented to person, place, and time  Skin: Skin is warm and dry  Psychiatric: He has a normal mood and affect  His behavior is normal    Nursing note and vitals reviewed        ED Medications  Medications   iodixanol (VISIPAQUE) 320 MG/ML injection 100 mL (100 mL Intravenous Given 11/6/19 1708)   cephalexin (KEFLEX) capsule 500 mg (500 mg Oral Given 11/6/19 1740)       Diagnostic Studies  Results Reviewed     Procedure Component Value Units Date/Time    Comprehensive metabolic panel [[id number]]  (Abnormal) Collected:  11/06/19 1621    Lab Status:  Final result Specimen:  Blood from Arm, Left Updated:  11/06/19 1654     Sodium 140 mmol/L      Potassium 3 8 mmol/L      Chloride 108 mmol/L      CO2 25 mmol/L      ANION GAP 7 mmol/L      BUN 20 mg/dL      Creatinine 1 30 mg/dL      Glucose 165 mg/dL      Calcium 8 9 mg/dL      AST 32 U/L      ALT 39 U/L      Alkaline Phosphatase 115 U/L      Total Protein 6 9 g/dL      Albumin 3 6 g/dL      Total Bilirubin 0 62 mg/dL      eGFR 52 ml/min/1 73sq m     Narrative:       [place] guidelines for Chronic Kidney Disease (CKD):     Stage 1 with normal or high GFR (GFR > 90 mL/min/1 73 square meters)    Stage 2 Mild CKD (GFR = 60-89 mL/min/1 73 square meters)    Stage 3A Moderate CKD (GFR = 45-59 mL/min/1 73 square meters)    Stage 3B Moderate CKD (GFR = 30-44 mL/min/1 73 square meters)    Stage 4 Severe CKD (GFR = 15-29 mL/min/1 73 square meters)    Stage 5 End Stage CKD (GFR <15 mL/min/1 73 square meters)  Note: GFR calculation is accurate only with a steady state creatinine    Lipase [[id number]]  (Abnormal) Collected:  11/06/19 1621    Lab Status:  Final result Specimen:  Blood from Arm, Left Updated:  11/06/19 1654     Lipase 49 u/L Urine Microscopic [[id number]]  (Abnormal) Collected:  11/06/19 1506    Lab Status:  Final result Specimen:  Urine, Clean Catch Updated:  11/06/19 1637     RBC, UA 4-10 /hpf      WBC, UA 2-4 /hpf      Epithelial Cells Occasional /hpf      Bacteria, UA Innumerable /hpf      Ca Oxalate Anna, UA Occasional /hpf      MUCUS THREADS Moderate    CBC and differential [[id number]] Collected:  11/06/19 1621    Lab Status:  Final result Specimen:  Blood from Arm, Left Updated:  11/06/19 1631     WBC 7 69 Thousand/uL      RBC 5 35 Million/uL      Hemoglobin 15 0 g/dL      Hematocrit 44 4 %      MCV 83 fL      MCH 28 0 pg      MCHC 33 8 g/dL      RDW 14 3 %      MPV 10 1 fL      Platelets 402 Thousands/uL      nRBC 0 /100 WBCs      Neutrophils Relative 70 %      Immat GRANS % 0 %      Lymphocytes Relative 20 %      Monocytes Relative 9 %      Eosinophils Relative 1 %      Basophils Relative 0 %      Neutrophils Absolute 5 33 Thousands/µL      Immature Grans Absolute 0 02 Thousand/uL      Lymphocytes Absolute 1 53 Thousands/µL      Monocytes Absolute 0 70 Thousand/µL      Eosinophils Absolute 0 09 Thousand/µL      Basophils Absolute 0 02 Thousands/µL     ED Urine Macroscopic [[id number]]  (Abnormal) Collected:  11/06/19 1506    Lab Status:  Final result Specimen:  Urine Updated:  11/06/19 1507     Color, UA Amira     Clarity, UA Cloudy     pH, UA 5 0     Leukocytes, UA Negative     Nitrite, UA Negative     Protein, UA >=300 mg/dl      Glucose, UA Negative mg/dl      Ketones, UA Negative mg/dl      Urobilinogen, UA 0 2 E U /dl      Bilirubin, UA Negative     Blood, UA Moderate     Specific Gravity, UA 1 025    Narrative:       CLINITEK RESULT                 CT abdomen pelvis with contrast   Final Result by Jet Trejo MD (11/06 1727)      No acute inflammatory changes in the abdomen or pelvis  Extensive nephrolithiasis slightly less prominent than the prior study without hydronephrosis or ureteral calculi  Ilya Juarez Workstation performed: [id number]               Procedures  Procedures        ED Course  ED Course as of Nov 06 2253   Wed Nov 06, 2019   1558 Blood, UA(!): Moderate   1640 Bacteria, UA(!): Innumerable   1640 Ca Oxalate Anna, UA(!): Occasional   1656 Creatinine: 1 30   1656 eGFR: 52           Identification of Seniors at Risk      Most Recent Value   (ISAR) Identification of Seniors at Risk   Before the illness or injury that brought you to the Emergency, did you need someone to help you on a regular basis? 0 Filed at: 11/06/2019 1437   In the last 24 hours, have you needed more help than usual?  0 Filed at: 11/06/2019 1437   Have you been hospitalized for one or more nights during the past 6 months? 0 Filed at: 11/06/2019 1437   In general, do you see well?  0 Filed at: 11/06/2019 1437   In general, do you have serious problems with your memory? 0 Filed at: 11/06/2019 1437   Do you take more than three different medications every day? 1 Filed at: 11/06/2019 1437   ISAR Score  1 Filed at: 11/06/2019 1437                          MDM  Number of Diagnoses or Management Options  Flank pain: new and requires workup  Nephrolithiasis: new and requires workup  UTI (urinary tract infection): new and requires workup  Diagnosis management comments: 70-year-old male presenting with left flank pain nonradiating with patient having hematuria on urinalysis concerning for possible stone  Will order abdominal laboratories as well as CT abdomen pelvis with contrast to evaluate for any additional pathology  Patient found to have nephrolithiasis with calcium oxalate crystals in his urine concerning for a passed stone discussed this with the patient he was also found have moderate bacteria in his urine concerning for infection will treat him with oral antibiotics and have him follow up with Urology in the next few days to return with any worsening symptoms  Patient agreeable to this plan         Amount and/or Complexity of Data Reviewed  Clinical lab tests: ordered and reviewed  Tests in the radiology section of CPT®: ordered and reviewed  Tests in the medicine section of CPT®: ordered and reviewed  Review and summarize past medical records: yes  Independent visualization of images, tracings, or specimens: yes        Disposition  Final diagnoses:   Nephrolithiasis   Flank pain   UTI (urinary tract infection)     Time reflects when diagnosis was documented in both MDM as applicable and the Disposition within this note     Time User Action Codes Description Comment    11/6/2019  5:30 PM Raghav David Add [N20 0] Nephrolithiasis     11/6/2019  5:30 PM [place] David Add [R10 9] Flank pain     11/6/2019  5:30 PM [place] David Add [N39 0] UTI (urinary tract infection)     11/6/2019  5:30 PM [place] David Modify [N20 0] Nephrolithiasis     11/6/2019  5:30 PM Raghav David Modify [N39 0] UTI (urinary tract infection)       ED Disposition     ED Disposition Condition Date/Time Comment    Discharge Stable Wed Nov 6, 2019  5:30 PM Glenn Bustamante discharge to home/self care              Follow-up Information     Follow up With Specialties Details Why Contact Info Additional [facility] Schedule an appointment as soon as possible for a visit in 3 days  [address] [address]  [address] [place] [address] For Urology [address], [address] [place] [address] Emergency Department Emergency Medicine  If symptoms worsen [address] 809 [facility] ED, [address] [place] [address], [id number] [phone]          Discharge Medication List as of 11/6/2019  5:32 PM      START taking these medications    Details   cephalexin (KEFLEX) 500 mg capsule Take 1 capsule (500 mg total) by mouth 4 (four) times a day for 10 days, Starting Wed 11/6/2019, Until Sat 11/16/2019, Print         CONTINUE these medications which have NOT CHANGED    Details   amLODIPine (NORVASC) 5 mg tablet TAKE 1 TABLET (5 MG TOTAL) BY MOUTH DAILY FOR 90 DAYS, Starting Wed 6/5/2019, Until Fri 10/25/2019, Normal      atorvastatin (LIPITOR) 20 mg tablet TAKE 1 TABLET BY MOUTH EVERY DAY, Normal      cyanocobalamin 1000 MCG tablet Take 1 tablet by mouth daily, Starting Fri 4/7/2017, Normal      insulin aspart (NOVOLOG FLEXPEN) 100 Units/mL injection pen Novolog Flexpen U-100 Insulin   2-8 units daily, Historical Med      metFORMIN (GLUCOPHAGE) 500 mg tablet TAKE 1 TABLET (500 MG TOTAL) BY MOUTH EVERY 12 (TWELVE) HOURS FOR 30 DAYS, Starting Mon 7/29/2019, Until Tue 10/15/2019, Normal      metoprolol tartrate (LOPRESSOR) 50 mg tablet Take 1 tablet by mouth every 12 (twelve) hours, Starting Fri 4/7/2017, Normal      Misc  Devices (ROLLATOR ULTRA-LIGHT) MISC by Does not apply route daily, Starting Tue 8/27/2019, Print      NOVOLIN N 100 UNIT/ML subcutaneous injection 12 units in the morning and 22 units in the evening, No Print      warfarin (COUMADIN) 5 mg tablet Take 5 mg by mouth daily , Starting Wed 8/30/2017, Historical Med           No discharge procedures on file  ED Provider  Attending physically available and evaluated Rc Dillon  SANDRA managed the patient along with the ED Attending      Electronically Signed by         Salvatore De La Cruz MD  11/06/19 0546

## 2019-11-06 NOTE — TELEPHONE ENCOUNTER
Patient last seen in the office on 10 25 19  He reports soon after that appointment but definitely over the last week he has had L flank pain that starts in his back and moves to his abdomen  He reports this pain is an 8/10 and is very sharp  Does radiate from back to abdomen "where my kidney's are"  --Tylenol does not help with the pain  Patient is negative for loss of appetite, hematuria, dysuria, he reports he is urinating well with light yellow to clear urine  Negative for N/V/D or constipation  Patient does have hx of nephrolithiasis but does states "this is not what it normally feels like"  He would like to know if he should go to the ER  I suggested he also call his PCP until I discuss with Dr Lao Precise

## 2019-11-06 NOTE — ED NOTES
Patient transported to [facility] [address], RN  11/06/19 [id number]

## 2019-11-06 NOTE — ED ATTENDING ATTESTATION
Seth Thapa DO, saw and evaluated the patient  I have discussed the patient with the resident/non-physician practitioner and agree with the resident's/non-physician practitioner's findings, Plan of Care, and MDM as documented in the resident's/non-physician practitioner's note, except where noted  All available labs and Radiology studies were reviewed  At this point I agree with the current assessment done in the Emergency Department  I have conducted an independent evaluation of this patient including a focused history and a physical exam     ED Note - Mukesh Bustamante 78 y o  male MRN: [id number]  Unit/Bed#: Juany Beckman Encounter: [id number]    History of Present Illness   HPI  Mya Elmore is a 78 y o  male who presents for evaluation of left flank pain radiating across his abdomen  Symptom onset approximately 1 week ago and have been fairly persistent  HX of kidney stones  Feels similar  No ripping or tearing type pain  No focal neuro deficits  No nausea or vomiting  No chest pain  No urinary symptoms or hematuria  No home remedies  REVIEW OF SYSTEMS  See HPI for further details  12 systems reviewed and otherwise negative except as noted     Historical Information     PAST MEDICAL HISTORY  Past Medical History:   Diagnosis Date    Arthritis     Carotid stenosis     Coronary artery disease     Last Assessed:  11/5/13    CVA [facility]re[facility])     Last Assessed:  1/10/18    Diabetes mellitus ([facility] [address] )     Controlled with kidney complication, Last Assessed:  1/10/18    Hematuria     Hyperlipidemia     Hypertension     Kidney stone     Renal disorder     Trigger finger     Vitamin D deficiency        FAMILY HISTORY  Family History   Problem Relation Age of Onset    Heart attack Mother     Coronary artery disease Mother     Emphysema Father     Cancer Brother         Leukemia    Hypertension Son     Diabetes Maternal Grandfather     Hypertension Other     Other Other Cerebrovascular accident (CVA)       SOCIAL HISTORY  Social History     Socioeconomic History    Marital status:      Spouse name: None    Number of children: None    Years of education: None    Highest education level: None   Occupational History    None   Social Needs    Financial resource strain: None    Food insecurity:     Worry: None     Inability: None    Transportation needs:     Medical: None     Non-medical: None   Tobacco Use    Smoking status: Former Smoker     Packs/day: 1 50     Years: 40 00     Pack years: 60 00     Types: Cigarettes    Smokeless tobacco: Never Used    Tobacco comment: quit 30 years ago  Substance and Sexual Activity    Alcohol use: No    Drug use: No    Sexual activity: None   Lifestyle    Physical activity:     Days per week: None     Minutes per session: None    Stress: None   Relationships    Social connections:     Talks on phone: None     Gets together: None     Attends Mu-ism service: None     Active member of club or organization: None     Attends meetings of clubs or organizations: None     Relationship status: None    Intimate partner violence:     Fear of current or ex partner: None     Emotionally abused: None     Physically abused: None     Forced sexual activity: None   Other Topics Concern    None   Social History Narrative    Daily caffeine consumption, 1 serving a day       SURGICAL HISTORY  Past Surgical History:   Procedure Laterality Date    CARDIAC SURGERY      CIRCUMCISION      Resolved:  1964, Elective    COLONOSCOPY      Resolved:  2006    CORONARY ARTERY BYPASS GRAFT  2000    6 vesels    CYSTOSCOPY      Resolved:  4/9/10, Diagnostic    CYSTOSCOPY  12/14/2018    Dr Trish Corey [place] INJECTION LEFT SHOULDER (ARTHROGRAM)  7/31/2019    FRACTURE SURGERY      left forearm fx with plate repair      HUMERUS FRACTURE SURGERY      Open, Greater tuberosity    LITHOTRIPSY  09/12/2013    Renal    NEPHROSTOMY Bilateral     With drainage    VASECTOMY       Meds/Allergies     CURRENT MEDICATIONS  No current facility-administered medications for this encounter  Current Outpatient Medications:     amLODIPine (NORVASC) 5 mg tablet, TAKE 1 TABLET (5 MG TOTAL) BY MOUTH DAILY FOR 90 DAYS, Disp: 90 tablet, Rfl: 1    atorvastatin (LIPITOR) 20 mg tablet, TAKE 1 TABLET BY MOUTH EVERY DAY, Disp: 90 tablet, Rfl: 1    cephalexin (KEFLEX) 500 mg capsule, Take 1 capsule (500 mg total) by mouth 4 (four) times a day for 10 days, Disp: 40 capsule, Rfl: 0    cyanocobalamin 1000 MCG tablet, Take 1 tablet by mouth daily, Disp: 30 tablet, Rfl: 3    insulin aspart (NOVOLOG FLEXPEN) 100 Units/mL injection pen, Novolog Flexpen U-100 Insulin  2-8 units daily, Disp: , Rfl:     metFORMIN (GLUCOPHAGE) 500 mg tablet, TAKE 1 TABLET (500 MG TOTAL) BY MOUTH EVERY 12 (TWELVE) HOURS FOR 30 DAYS, Disp: 180 tablet, Rfl: 1    metoprolol tartrate (LOPRESSOR) 50 mg tablet, Take 1 tablet by mouth every 12 (twelve) hours (Patient not taking: Reported on 10/25/2019), Disp: 60 tablet, Rfl: 3    Misc  Devices (ROLLATOR ULTRA-LIGHT) MISC, by Does not apply route daily, Disp: 1 each, Rfl: 0    NOVOLIN N 100 UNIT/ML subcutaneous injection, 12 units in the morning and 22 units in the evening, Disp: 10 mL, Rfl: 0    warfarin (COUMADIN) 5 mg tablet, Take 5 mg by mouth daily , Disp: , Rfl:     (Not in a hospital admission)    ALLERGIES  Allergies   Allergen Reactions    Flu Virus Vaccine Rash    Haemophilus Influenzae Rash     Objective     PHYSICAL EXAM    VITAL SIGNS: Blood pressure (!) 172/77, pulse 67, temperature 97 8 °F (36 6 °C), temperature source Oral, resp  rate 18, weight 88 4 kg (194 lb 14 2 oz), SpO2 95 %      Constitutional:  Appears well developed and well nourished, no acute distress, non-toxic appearance   Eyes:  PERRL, EOMI, conjunctivae pink, sclerae non-icteric    HENT:  Normocephalic/Atraumatic, no rhinorrhea, mucous membranes moist   Neck: normal range of motion, no tenderness, supple   Respiratory:  No respiratory distress, normal breath sounds   Cardiovascular:  Normal rate, normal rhythm    GI:  Soft, non-tender, non-distended  :  No CVAT, no flank ecchymosis  Musculoskeletal:  No swelling or edema, no tenderness, no deformities  Integument:  Pink, warm, dry, Well hydrated, no rash, no erythema, no bullae   Lymphatic:  No cervical/ tonsillar/ submandibular lymphadenopathy noted   Neurologic:  Awake, Alert & oriented x 3, CN 2-12 intact, no focal neurological deficits, motor function intact  Psychiatric:  Speech and behavior appropriate       ED COURSE and MDM:    Assessment/Plan   Assessment:  Asif Woods is a 78 y o  male presents for evaluation of left flank pain  Plan:  Labs, CT abdomen/pelvis, symptom management, disposition as appropriate  CRITICAL CARE TIME: 0 minutes      Portions of the record may have been created with voice recognition software  Occasional wrong word or "sound a like" substitutions may have occurred due to the inherent limitations of voice recognition software       ED Provider  Electronically Signed by

## 2019-11-07 ENCOUNTER — TELEPHONE (OUTPATIENT)
Dept: UROLOGY | Facility: MEDICAL CENTER | Age: 79
End: 2019-11-07

## 2019-11-07 NOTE — TELEPHONE ENCOUNTER
Patient Treated in ER 11/6 for UTI  , Went to ER for Flank pain   Advised patient he already had an appointment scheduled with Jasmin Whalen 12/16   He just needed to continue his antibiotics and we will address when he returns for next appointment

## 2019-11-07 NOTE — TELEPHONE ENCOUNTER
Please Triage - ER Follow Up  Dr Ebenezer Quiroga    Patients daughter calling to set up ER Follow up appointment  Patient seen at [facility] 11/6 for UTI      Patient can be reached at  [phone]

## 2019-11-18 ENCOUNTER — APPOINTMENT (OUTPATIENT)
Dept: LAB | Facility: CLINIC | Age: 79
End: 2019-11-18
Payer: COMMERCIAL

## 2019-12-03 LAB — HBA1C MFR BLD HPLC: 7.8 %

## 2019-12-13 NOTE — PROGRESS NOTES
12/16/2019    Brendan Donohue [facility]  1940  [id number]      Assessment  -Microscopic hematuria s/p negative hematuria workup (12/14/18)  -Nephrolithiasis     Discussion/Plan  Edwin Ybarra is a 78 y o  male being managed by Dr Walker Borges        -We reviewed results of recent CT abdomen pelvis w contrast which identified partially duplicated right kidney, and 12mm left lower pole nonobstructing calculus  Simple renal cysts noted, negative hydronephrosis  Findings noted on previous images  He is asymptomatic at this time  Last creatinine 11/06/2019 was 1 30  He continues to follow under Nephrology  We discussed surgical intervention versus observation  Patient wishes to monitor, and defers surgery  Review dietary and behavior modifications as well as ER precautions   -Patient will return in 1 year with KUB  He was instructed to call with any issues  -All questions answered, patient agrees with plan      History of Present Illness  78 y o  male with a history of nephrolithiasis presents today for follow up  Patient seen one year ago, when he underwent cystoscopic evaluation for microscopic hematuria  No significant findings were noted  He recently presented to ER on 11/6/19 for reports of left sided flank pain  This prompted CT scan imaging  CT abdomen pelvis with contrast identified 12 mm nonobstructing left lower pole calculus, partially duplicated right kidney, and no hydronephrosis  These findings were noted on previous images  Patient given course of Keflex for presumed urinary tract infection, however no culture was sent  He states that flank pain has resolved  Patient denies any additional lower urinary tract symptoms, gross hematuria, or dysuria  He has history of bilateral PCNU in 2013 for bilateral obstructing nephrolithiasis  Review of Systems  Review of Systems   Constitutional: Negative  HENT: Negative  Respiratory: Negative  Cardiovascular: Negative      Gastrointestinal: Negative  Genitourinary: Negative for decreased urine volume, difficulty urinating, dysuria, flank pain, frequency, hematuria and urgency  Musculoskeletal: Negative  Skin: Negative  Neurological: Negative  Psychiatric/Behavioral: Negative  Past Medical History  Past Medical History:   Diagnosis Date    Arthritis     Carotid stenosis     Coronary artery disease     Last Assessed:  11/5/13    CVA (cere[facility] vascular accident[facility])     Last Assessed:  1/10/18    Diabetes mellitus ([facility] [address] )     Controlled with kidney complication, Last Assessed:  1/10/18    Hematuria     Hyperlipidemia     Hypertension     Kidney stone     Renal disorder     Trigger finger     Vitamin D deficiency        Past Social History  Past Surgical History:   Procedure Laterality Date    CARDIAC SURGERY      CIRCUMCISION      Resolved:  1964, Elective    COLONOSCOPY      Resolved:  2006    CORONARY ARTERY BYPASS GRAFT  2000    6 vesels    CYSTOSCOPY      Resolved:  4/9/10, Diagnostic    CYSTOSCOPY  12/14/2018    Dr Roselia Kwok [place] INJECTION LEFT SHOULDER (ARTHROGRAM)  7/31/2019    FRACTURE SURGERY      left forearm fx with plate repair      HUMERUS FRACTURE SURGERY      Open, Greater tuberosity    LITHOTRIPSY  09/12/2013    Renal    NEPHROSTOMY Bilateral     With drainage    VASECTOMY         Past Family History  Family History   Problem Relation Age of Onset    Heart attack Mother     Coronary artery disease Mother     Emphysema Father     Cancer Brother         Leukemia    Hypertension Son     Diabetes Maternal Grandfather     Hypertension Other     Other Other         Cerebrovascular accident (CVA)       Past Social history  Social History     Socioeconomic History    Marital status:      Spouse name: Not on file    Number of children: Not on file    Years of education: Not on file    Highest education level: Not on file   Occupational History    Not on file   Social Needs    Financial resource strain: Not on file    Food insecurity:     Worry: Not on file     Inability: Not on file    Transportation needs:     Medical: Not on file     Non-medical: Not on file   Tobacco Use    Smoking status: Former Smoker     Packs/day: 1 50     Years: 40 00     Pack years: 60 00     Types: Cigarettes    Smokeless tobacco: Never Used    Tobacco comment: quit 30 years ago     Substance and Sexual Activity    Alcohol use: No    Drug use: No    Sexual activity: Not on file   Lifestyle    Physical activity:     Days per week: Not on file     Minutes per session: Not on file    Stress: Not on file   Relationships    Social connections:     Talks on phone: Not on file     Gets together: Not on file     Attends Scientology service: Not on file     Active member of club or organization: Not on file     Attends meetings of clubs or organizations: Not on file     Relationship status: Not on file    Intimate partner violence:     Fear of current or ex partner: Not on file     Emotionally abused: Not on file     Physically abused: Not on file     Forced sexual activity: Not on file   Other Topics Concern    Not on file   Social History Narrative    Daily caffeine consumption, 1 serving a day       Current Medications  Current Outpatient Medications   Medication Sig Dispense Refill    amLODIPine (NORVASC) 5 mg tablet TAKE 1 TABLET (5 MG TOTAL) BY MOUTH DAILY FOR 90 DAYS 90 tablet 1    atorvastatin (LIPITOR) 20 mg tablet TAKE 1 TABLET BY MOUTH EVERY DAY 90 tablet 1    cyanocobalamin 1000 MCG tablet Take 1 tablet by mouth daily 30 tablet 3    insulin aspart (NOVOLOG FLEXPEN) 100 Units/mL injection pen Novolog Flexpen U-100 Insulin   2-8 units daily      metFORMIN (GLUCOPHAGE) 500 mg tablet TAKE 1 TABLET (500 MG TOTAL) BY MOUTH EVERY 12 (TWELVE) HOURS FOR 30 DAYS 180 tablet 1    metoprolol tartrate (LOPRESSOR) 50 mg tablet Take 1 tablet by mouth every 12 (twelve) hours (Patient not taking: Reported on 10/25/2019) 60 tablet 3    Misc  Devices (ROLLATOR ULTRA-LIGHT) MISC by Does not apply route daily 1 each 0    NOVOLIN N 100 UNIT/ML subcutaneous injection 12 units in the morning and 22 units in the evening 10 mL 0    warfarin (COUMADIN) 5 mg tablet Take 5 mg by mouth daily        No current facility-administered medications for this visit  Allergies  Allergies   Allergen Reactions    Flu Virus Vaccine Rash    Haemophilus Influenzae Rash       Past Medical History, Social History, Family History, medications and allergies were reviewed  Vitals  There were no vitals filed for this visit  Physical Exam  Physical Exam   Constitutional: He is oriented to person, place, and time  He appears well-developed and well-nourished  HENT:   Head: Normocephalic  Eyes: Pupils are equal, round, and reactive to light  Neck: Normal range of motion  Cardiovascular: Normal rate and regular rhythm  Pulmonary/Chest: Effort normal    Abdominal: Soft  Normal appearance  He exhibits no distension  There is no tenderness  There is no CVA tenderness  Genitourinary:   Genitourinary Comments: Negative CVA tenderness   Musculoskeletal: Normal range of motion  Ambulates with cane   Neurological: He is alert and oriented to person, place, and time  Skin: Skin is warm and dry  Psychiatric: He has a normal mood and affect   His behavior is normal  Judgment and thought content normal        Results    I have personally reviewed all pertinent lab results and reviewed with patient  No results found for: PSA  Lab Results   Component Value Date    GLUCOSE 156 (H) 12/31/2015    CALCIUM 8 9 11/06/2019     12/31/2015    K 3 8 11/06/2019    CO2 25 11/06/2019     11/06/2019    BUN 20 11/06/2019    CREATININE 1 30 11/06/2019     Lab Results   Component Value Date    WBC 7 69 11/06/2019    HGB 15 0 11/06/2019    HCT 44 4 11/06/2019    MCV 83 11/06/2019     11/06/2019     No results found for this or any previous visit (from the past 1 hour(s))

## 2019-12-16 ENCOUNTER — OFFICE VISIT (OUTPATIENT)
Dept: UROLOGY | Facility: AMBULATORY SURGERY CENTER | Age: 79
End: 2019-12-16
Payer: COMMERCIAL

## 2019-12-16 VITALS
DIASTOLIC BLOOD PRESSURE: 70 MMHG | HEIGHT: 66 IN | BODY MASS INDEX: 30.53 KG/M2 | WEIGHT: 190 LBS | HEART RATE: 74 BPM | SYSTOLIC BLOOD PRESSURE: 158 MMHG

## 2019-12-16 DIAGNOSIS — R31.29 MICROSCOPIC HEMATURIA: ICD-10-CM

## 2019-12-16 DIAGNOSIS — N20.0 NEPHROLITHIASIS: Primary | ICD-10-CM

## 2019-12-16 PROCEDURE — 99213 OFFICE O/P EST LOW 20 MIN: CPT | Performed by: NURSE PRACTITIONER

## 2019-12-21 DIAGNOSIS — E78.2 MIXED HYPERLIPIDEMIA: ICD-10-CM

## 2019-12-21 NOTE — TELEPHONE ENCOUNTER
Per patient, he was instructed by Dr Reymundo Osler to take 40 mg 1 time a day of the atorvastatin rather than 20 mg 1x a day due to his elevated cholesterol level  Patient is requesting a refill of the atorvastatin to [address] [facility]  Patient will bring in a copy of his lab results Monday that were ordered by the [facility]  Patient can be contacted at [phone] with any questions

## 2019-12-23 RX ORDER — ATORVASTATIN CALCIUM 40 MG/1
40 TABLET, FILM COATED ORAL DAILY
Qty: 90 TABLET | Refills: 0 | Status: SHIPPED | OUTPATIENT
Start: 2019-12-23 | End: 2020-03-19

## 2019-12-26 ENCOUNTER — TELEPHONE (OUTPATIENT)
Dept: FAMILY MEDICINE CLINIC | Facility: CLINIC | Age: 79
End: 2019-12-26

## 2019-12-26 ENCOUNTER — DOCUMENTATION (OUTPATIENT)
Dept: FAMILY MEDICINE CLINIC | Facility: CLINIC | Age: 79
End: 2019-12-26

## 2019-12-26 ENCOUNTER — APPOINTMENT (OUTPATIENT)
Dept: LAB | Facility: CLINIC | Age: 79
End: 2019-12-26
Payer: COMMERCIAL

## 2019-12-26 NOTE — TELEPHONE ENCOUNTER
Patient dropped off lab results from the [facility]) today  They are on your desk

## 2019-12-26 NOTE — PROGRESS NOTES
Reviewed labs done at [place] on 12/3/2019  hgA1C 7 8 slightly worse, pt should follow low carb diet  Lipid 158/145/33/103, agree to take atorvastatin 40mg qhs  Pt should follow low fat diet     White blood cell normal  Hemoglobin normal  Platelet normal  Electrolyte normal  Liver enzymes normal  Kidney function ok

## 2020-01-05 DIAGNOSIS — I10 BENIGN ESSENTIAL HYPERTENSION: ICD-10-CM

## 2020-01-06 RX ORDER — AMLODIPINE BESYLATE 5 MG/1
5 TABLET ORAL DAILY
Qty: 90 TABLET | Refills: 3 | Status: SHIPPED | OUTPATIENT
Start: 2020-01-06 | End: 2020-12-22 | Stop reason: SDUPTHER

## 2020-01-07 DIAGNOSIS — E11.8 TYPE 2 DIABETES MELLITUS WITH COMPLICATION, WITHOUT LONG-TERM CURRENT USE OF INSULIN (HCC): ICD-10-CM

## 2020-01-27 ENCOUNTER — APPOINTMENT (OUTPATIENT)
Dept: LAB | Facility: CLINIC | Age: 80
End: 2020-01-27
Payer: COMMERCIAL

## 2020-01-27 ENCOUNTER — TRANSCRIBE ORDERS (OUTPATIENT)
Dept: LAB | Facility: CLINIC | Age: 80
End: 2020-01-27

## 2020-01-27 DIAGNOSIS — I48.0 PAROXYSMAL ATRIAL FIBRILLATION (HCC): Primary | ICD-10-CM

## 2020-01-27 LAB
INR PPP: 1.66 (ref 0.84–1.19)
PROTHROMBIN TIME: 19.1 SECONDS (ref 11.6–14.5)

## 2020-01-27 PROCEDURE — 36415 COLL VENOUS BLD VENIPUNCTURE: CPT

## 2020-01-27 PROCEDURE — 85610 PROTHROMBIN TIME: CPT

## 2020-02-04 ENCOUNTER — APPOINTMENT (OUTPATIENT)
Dept: LAB | Facility: CLINIC | Age: 80
End: 2020-02-04
Payer: COMMERCIAL

## 2020-02-10 ENCOUNTER — OFFICE VISIT (OUTPATIENT)
Dept: FAMILY MEDICINE CLINIC | Facility: CLINIC | Age: 80
End: 2020-02-10
Payer: COMMERCIAL

## 2020-02-10 VITALS
OXYGEN SATURATION: 96 % | DIASTOLIC BLOOD PRESSURE: 64 MMHG | WEIGHT: 191.6 LBS | HEART RATE: 80 BPM | SYSTOLIC BLOOD PRESSURE: 120 MMHG | HEIGHT: 66 IN | TEMPERATURE: 97.6 F | RESPIRATION RATE: 16 BRPM | BODY MASS INDEX: 30.79 KG/M2

## 2020-02-10 DIAGNOSIS — E78.5 HYPERLIPIDEMIA, UNSPECIFIED HYPERLIPIDEMIA TYPE: ICD-10-CM

## 2020-02-10 DIAGNOSIS — E11.22 TYPE 2 DIABETES MELLITUS WITH STAGE 3 CHRONIC KIDNEY DISEASE, WITH LONG-TERM CURRENT USE OF INSULIN (HCC): ICD-10-CM

## 2020-02-10 DIAGNOSIS — R41.3 MEMORY LOSS: Primary | ICD-10-CM

## 2020-02-10 DIAGNOSIS — I48.91 ATRIAL FIBRILLATION, UNSPECIFIED TYPE (HCC): ICD-10-CM

## 2020-02-10 DIAGNOSIS — N18.30 TYPE 2 DIABETES MELLITUS WITH STAGE 3 CHRONIC KIDNEY DISEASE, WITH LONG-TERM CURRENT USE OF INSULIN (HCC): ICD-10-CM

## 2020-02-10 DIAGNOSIS — Z79.4 TYPE 2 DIABETES MELLITUS WITH STAGE 3 CHRONIC KIDNEY DISEASE, WITH LONG-TERM CURRENT USE OF INSULIN (HCC): ICD-10-CM

## 2020-02-10 DIAGNOSIS — I10 ESSENTIAL HYPERTENSION: ICD-10-CM

## 2020-02-10 PROCEDURE — 3074F SYST BP LT 130 MM HG: CPT | Performed by: FAMILY MEDICINE

## 2020-02-10 PROCEDURE — 3078F DIAST BP <80 MM HG: CPT | Performed by: FAMILY MEDICINE

## 2020-02-10 PROCEDURE — 3008F BODY MASS INDEX DOCD: CPT | Performed by: FAMILY MEDICINE

## 2020-02-10 PROCEDURE — 99214 OFFICE O/P EST MOD 30 MIN: CPT | Performed by: FAMILY MEDICINE

## 2020-02-10 PROCEDURE — 4040F PNEUMOC VAC/ADMIN/RCVD: CPT | Performed by: FAMILY MEDICINE

## 2020-02-10 PROCEDURE — 3066F NEPHROPATHY DOC TX: CPT | Performed by: FAMILY MEDICINE

## 2020-02-10 PROCEDURE — 1160F RVW MEDS BY RX/DR IN RCRD: CPT | Performed by: FAMILY MEDICINE

## 2020-02-10 PROCEDURE — 1036F TOBACCO NON-USER: CPT | Performed by: FAMILY MEDICINE

## 2020-02-10 NOTE — ASSESSMENT & PLAN NOTE
Lab Results   Component Value Date    HGBA1C 7 8 12/03/2019     Stable  FU endocrinology in [place]  Continue metformin and novolog

## 2020-02-10 NOTE — PROGRESS NOTES
Chief Complaint   Patient presents with    Follow-up     6 months  Health Maintenance   Topic Date Due    SLP PLAN OF CARE  1940    DTaP,Tdap,and Td Vaccines (1 - Tdap) 10/14/1951    Hepatitis B Vaccine (1 of 3 - Risk 3-dose series) 10/14/1959    Medicare Annual Wellness Visit (AWV)  05/26/2017    Influenza Vaccine  07/01/2019    DM Eye Exam  07/27/2020    Fall Risk  08/02/2020    Depression Screening PHQ  08/02/2020    BMI: Followup Plan  08/02/2020    Diabetic Foot Exam  08/02/2020    CRC Screening: Colonoscopy  09/08/2020    URINE MICROALBUMIN  09/26/2020    HEMOGLOBIN A1C  12/03/2020    BMI: Adult  12/16/2020    Pneumococcal Vaccine: 65+ Years  Completed    Pneumococcal Vaccine: Pediatrics (0 to 5 Years) and At-Risk Patients (6 to 59 Years)  Aged Out    HIB Vaccine  Aged Out    IPV Vaccine  Aged Out    Hepatitis A Vaccine  Aged Out    Meningococcal ACWY Vaccine  Aged Out    HPV Vaccine  Aged Out     Assessment/Plan:    BMI Counseling: Body mass index is 30 93 kg/m²  Follow-up plan was not completed due to elderly patient (72 years old) where weight reduction/weight gain would complicate underlying health condition such as: illness or physical disability  Type 2 diabetes mellitus, with long-term current use of insulin (HCC)    Lab Results   Component Value Date    HGBA1C 7 8 12/03/2019     Stable  FU endocrinology in [address] [facility]  Continue metformin and novolog  Essential hypertension  Controlled  DASH diet  Continue amlodipine 5mg QD  Atrial fibrillation (HCC)  Continue coumadin  Cardiology from [facility] coumadin clinic follows PT/INR  Hyperlipidemia  12/2019 lipid 158/145/33/102 ok  Low fat diet  Continue atorvastatin 40mg qhs  Memory loss  Mini-cog 3/5 today in office  Will check labs  Refused neurology for further studies  Pt states he will call if he would like to see neurology         Refused flu shot because he is allergic to it    Got PCV 13 in 2015 and pneumovax in 2016  Fall precautions  RTO in 6 months  Diagnoses and all orders for this visit:    Memory loss  -     TSH, 3rd generation with Free T4 reflex; Future  -     Vitamin B12; Future  -     Folate; Future    Type 2 diabetes mellitus with stage 3 chronic kidney disease, with long-term current use of insulin (HCC)    Essential hypertension    Atrial fibrillation, unspecified type (HCC)    Hyperlipidemia, unspecified hyperlipidemia type          Subjective:      Patient ID: Marquis Gonzalez is a 78 y o  male  HPI    Pt is here by himself  Memory loss----for 6 months  Pt states sometimes he forgot important things, so he had to write them down  Pt states he forgot his grandkid's names and birthdays, so he wrote them on the calender  Denies lost way going home etc      HTN----He is on amlodipine 5mg QD  BP at home 110/60 per pt  Hx of CVA in 3/2017  Right leg feels weakness  Use walker everyday  Afib---He is on coumadin  Cardiology follows PT/INR  S/p packemaker 11/2017  FU cardiology from [facility] regularly  DWIGHT---He does not use CPAP per pt       DM---for more than 10 years  12/2019 hgA1C 7 8 stable  FU endocrinology in [address] every 6 months  He is on meformin 500mg bid and novolin 35u bid  Had foot neuropathy  Use walker/cane  Fu opthalmology Dr Jose Manuel Wilcox regularly    FU podiatry Q 3 months       Hyperlipidemia---Pt is on atorvastatin 40mg qhs  Denies side effects  Lives by himself  Has a cat  Walks with walker  Does all ADL's  Denies recent falls  Denies depression  The following portions of the patient's history were reviewed and updated as appropriate: allergies, current medications, past family history, past medical history, past social history, past surgical history and problem list     Review of Systems   Constitutional: Negative for appetite change, chills and fever  HENT: Negative for congestion, ear pain, sinus pain and sore throat      Eyes: Negative for discharge and itching  Respiratory: Negative for apnea, cough, chest tightness, shortness of breath and wheezing  Cardiovascular: Negative for chest pain, palpitations and leg swelling  Gastrointestinal: Negative for abdominal pain, anal bleeding, constipation, diarrhea, nausea and vomiting  Endocrine: Negative for cold intolerance, heat intolerance and polyuria  Genitourinary: Negative for difficulty urinating and dysuria  Musculoskeletal: Negative for arthralgias, back pain and myalgias  Skin: Negative for rash  Neurological: Positive for numbness  Negative for dizziness and headaches  Psychiatric/Behavioral: Negative for agitation  Objective:      /64 (BP Location: Left arm, Patient Position: Sitting, Cuff Size: Adult)   Pulse 80   Temp 97 6 °F (36 4 °C) (Tympanic)   Resp 16   Ht 5' 6" (1 676 m)   Wt 86 9 kg (191 lb 9 6 oz)   SpO2 96%   BMI 30 93 kg/m²          Physical Exam   Constitutional: He appears well-developed  HENT:   Head: Normocephalic and atraumatic  Eyes: Pupils are equal, round, and reactive to light  Conjunctivae are normal    Neck: Normal range of motion  Neck supple  No JVD present  No tracheal deviation present  No thyromegaly present  Cardiovascular: Normal rate, regular rhythm, normal heart sounds and intact distal pulses  Exam reveals no gallop and no friction rub  No murmur heard  Pulmonary/Chest: Effort normal and breath sounds normal  No stridor  No respiratory distress  He has no wheezes  He has no rales  Abdominal: Soft  Bowel sounds are normal  He exhibits no distension and no mass  There is no tenderness  There is no rebound and no guarding  Musculoskeletal: He exhibits no edema or tenderness  Use walker   Lymphadenopathy:     He has no cervical adenopathy  Neurological: He is alert

## 2020-02-10 NOTE — ASSESSMENT & PLAN NOTE
Mini-cog 3/5 today in office  Will check labs  Refused neurology for further studies  Pt states he will call if he would like to see neurology

## 2020-03-04 ENCOUNTER — APPOINTMENT (OUTPATIENT)
Dept: LAB | Facility: CLINIC | Age: 80
End: 2020-03-04
Payer: COMMERCIAL

## 2020-03-18 ENCOUNTER — APPOINTMENT (OUTPATIENT)
Dept: LAB | Facility: CLINIC | Age: 80
End: 2020-03-18
Payer: COMMERCIAL

## 2020-03-19 DIAGNOSIS — E78.2 MIXED HYPERLIPIDEMIA: ICD-10-CM

## 2020-03-19 RX ORDER — ATORVASTATIN CALCIUM 40 MG/1
TABLET, FILM COATED ORAL
Qty: 90 TABLET | Refills: 3 | Status: SHIPPED | OUTPATIENT
Start: 2020-03-19 | End: 2021-04-19

## 2020-03-27 ENCOUNTER — TELEPHONE (OUTPATIENT)
Dept: FAMILY MEDICINE CLINIC | Facility: CLINIC | Age: 80
End: 2020-03-27

## 2020-03-27 NOTE — TELEPHONE ENCOUNTER
I called pt  Pt states his BP elevated today  Feels fine  Denies headache, Sob, CP, n/v/abd pain  Advised pt to check BP at home  Follow low salt diet  Call me next week if his BP always >150/90  Pt understood

## 2020-03-31 ENCOUNTER — TELEPHONE (OUTPATIENT)
Dept: FAMILY MEDICINE CLINIC | Facility: CLINIC | Age: 80
End: 2020-03-31

## 2020-03-31 NOTE — TELEPHONE ENCOUNTER
Patient phoned with the following BP Readings:  3/29/20: 126/58-8 a m ; 114/51-7:40 p m   3/30/20: 131/56-8:10 a m   3/31/20: 91/46 7:30 a m ; 100/43-7:45 a m  Patient can be contacted at [phone] with any questions

## 2020-03-31 NOTE — TELEPHONE ENCOUNTER
I called pt  Pt states he feels wonderful  Denies lightheaded, dizzy, SOB, CP, n/v/abd pain  I told pt do not take amlodipine if BP <100/60  Pt understood  Call office with any questions

## 2020-07-01 DIAGNOSIS — E11.8 TYPE 2 DIABETES MELLITUS WITH COMPLICATION, WITHOUT LONG-TERM CURRENT USE OF INSULIN (HCC): ICD-10-CM

## 2020-08-10 ENCOUNTER — OFFICE VISIT (OUTPATIENT)
Dept: FAMILY MEDICINE CLINIC | Facility: CLINIC | Age: 80
End: 2020-08-10
Payer: COMMERCIAL

## 2020-08-10 VITALS
HEART RATE: 90 BPM | WEIGHT: 189.2 LBS | SYSTOLIC BLOOD PRESSURE: 132 MMHG | OXYGEN SATURATION: 96 % | TEMPERATURE: 98.3 F | RESPIRATION RATE: 20 BRPM | HEIGHT: 66 IN | BODY MASS INDEX: 30.41 KG/M2 | DIASTOLIC BLOOD PRESSURE: 66 MMHG

## 2020-08-10 DIAGNOSIS — E11.42 TYPE 2 DIABETES MELLITUS WITH DIABETIC POLYNEUROPATHY, WITH LONG-TERM CURRENT USE OF INSULIN (HCC): Primary | ICD-10-CM

## 2020-08-10 DIAGNOSIS — I48.91 ATRIAL FIBRILLATION, UNSPECIFIED TYPE (HCC): ICD-10-CM

## 2020-08-10 DIAGNOSIS — E78.5 HYPERLIPIDEMIA, UNSPECIFIED HYPERLIPIDEMIA TYPE: ICD-10-CM

## 2020-08-10 DIAGNOSIS — Z79.4 TYPE 2 DIABETES MELLITUS WITH DIABETIC POLYNEUROPATHY, WITH LONG-TERM CURRENT USE OF INSULIN (HCC): Primary | ICD-10-CM

## 2020-08-10 DIAGNOSIS — E66.9 OBESITY (BMI 30-39.9): ICD-10-CM

## 2020-08-10 DIAGNOSIS — I10 ESSENTIAL HYPERTENSION: ICD-10-CM

## 2020-08-10 PROBLEM — H90.3 SENSORINEURAL HEARING LOSS (SNHL) OF BOTH EARS: Status: ACTIVE | Noted: 2020-08-10

## 2020-08-10 PROBLEM — R31.29 MICROSCOPIC HEMATURIA: Status: ACTIVE | Noted: 2020-08-10

## 2020-08-10 PROCEDURE — 3075F SYST BP GE 130 - 139MM HG: CPT | Performed by: FAMILY MEDICINE

## 2020-08-10 PROCEDURE — 3078F DIAST BP <80 MM HG: CPT | Performed by: FAMILY MEDICINE

## 2020-08-10 PROCEDURE — 3008F BODY MASS INDEX DOCD: CPT | Performed by: FAMILY MEDICINE

## 2020-08-10 PROCEDURE — 1101F PT FALLS ASSESS-DOCD LE1/YR: CPT | Performed by: FAMILY MEDICINE

## 2020-08-10 PROCEDURE — 3288F FALL RISK ASSESSMENT DOCD: CPT | Performed by: FAMILY MEDICINE

## 2020-08-10 PROCEDURE — 3066F NEPHROPATHY DOC TX: CPT | Performed by: FAMILY MEDICINE

## 2020-08-10 PROCEDURE — 4040F PNEUMOC VAC/ADMIN/RCVD: CPT | Performed by: FAMILY MEDICINE

## 2020-08-10 PROCEDURE — 99214 OFFICE O/P EST MOD 30 MIN: CPT | Performed by: FAMILY MEDICINE

## 2020-08-10 PROCEDURE — 1160F RVW MEDS BY RX/DR IN RCRD: CPT | Performed by: FAMILY MEDICINE

## 2020-08-10 PROCEDURE — 1036F TOBACCO NON-USER: CPT | Performed by: FAMILY MEDICINE

## 2020-08-10 PROCEDURE — 3725F SCREEN DEPRESSION PERFORMED: CPT | Performed by: FAMILY MEDICINE

## 2020-08-10 RX ORDER — METOPROLOL TARTRATE 50 MG/1
50 TABLET, FILM COATED ORAL 2 TIMES DAILY
COMMUNITY
End: 2021-02-15 | Stop reason: ALTCHOICE

## 2020-08-10 NOTE — PROGRESS NOTES
Chief Complaint   Patient presents with    Follow-up     6 months  Health Maintenance   Topic Date Due    SLP PLAN OF CARE  1940    DTaP,Tdap,and Td Vaccines (1 - Tdap) 10/14/1961    Medicare Annual Wellness Visit (AWV)  05/26/2017    Influenza Vaccine  07/01/2020    DM Eye Exam  07/27/2020    Colonoscopy Surveillance  09/08/2020    URINE MICROALBUMIN  09/26/2020    Depression Screening PHQ  02/10/2021    HEMOGLOBIN A1C  12/03/2020    Diabetic Foot Exam  02/26/2021    Fall Risk  08/10/2021    BMI: Adult  08/10/2021    Pneumococcal Vaccine: 65+ Years  Completed    HIB Vaccine  Aged Out    Hepatitis B Vaccine  Aged Out    IPV Vaccine  Aged Out    Hepatitis A Vaccine  Aged Out    Meningococcal ACWY Vaccine  Aged Out    HPV Vaccine  Aged Out       Assessment/Plan:    Type 2 diabetes mellitus with diabetic polyneuropathy, with long-term current use of insulin ([facility] [address] )    Lab Results   Component Value Date    HGBA1C 7 8 12/03/2019     Will labs at [address] [facility] next week  FU endocrinology at [address] [facility]  Low carb diet  Continue metformin and novolin  Essential hypertension  Controlled  DASH diet  Continue amlodipine 5mg QD and metoprolol 50mg bid  Atrial fibrillation ([address] )  [facility] cardiology from [facility]  Continue coumadin and beta blocker  Coumadin clinic from [facility] follows PT/INR  Hyperlipidemia  Low fat diet  Continue atorvastatin 40mg qhs  Memory loss  Pt states it is stable  Refused flu shot because he is allergic to it    Got PCV 13 in 2015 and pneumovax in 2016    Fall precautions    RTO in 6 months  Diagnoses and all orders for this visit:    Type 2 diabetes mellitus with diabetic polyneuropathy, with long-term current use of insulin ([address] )    Essential hypertension    Atrial fibrillation, unspecified type (HCC)    Hyperlipidemia, unspecified hyperlipidemia type    Obesity (BMI 30-39  9)    Other orders  -     metoprolol tartrate (LOPRESSOR) 50 mg tablet;  Take 50 mg by mouth 2 (two) times a day          Subjective:      Patient ID: Elvis Estrada is a 78 y o  male  HPI    Pt is here by himself  DM---for more than 10 years  12/2019 hgA1C 7 8 stable  Will do labs at [facility] next month per pt  Will send me copy per pt  FU endocrinology in [facility] every 6 months  He is on meformin 500mg bid and novolin 35u bid  Blood sugar at home 110-150 per pt  Had foot neuropathy  Use walker/cane  Fu opthalmology Dr Palma Portal regularly    FU podiatry Q 3 months  Got a new diabetic shoes      HTN----He is on amlodipine 5mg QD and metoprolol 50mg bid  BP at home 110/60 per pt    Hx of CVA in 3/2017  Right leg feels weakness  Use walker everyday  Afib---He is on coumadin  Cardiology follows PT/INR  S/p packemaker 11/2017  FU cardiology from [facility] regularly  DWIGHT---He does not use CPAP per pt       Hyperlipidemia---Pt is on atorvastatin 40mg qhs  Denies side effects       Lives by himself  His cat passed away in 7/2020  Walks with walker/cane  Does all ADL's  Denies recent falls  Denies depression  The following portions of the patient's history were reviewed and updated as appropriate: allergies, current medications, past family history, past medical history, past social history, past surgical history and problem list     Review of Systems   Constitutional: Negative for appetite change, chills and fever  HENT: Negative for congestion, ear pain, sinus pain and sore throat  Eyes: Negative for discharge and itching  Respiratory: Negative for apnea, cough, chest tightness, shortness of breath and wheezing  Cardiovascular: Negative for chest pain, palpitations and leg swelling  Gastrointestinal: Negative for abdominal pain, anal bleeding, constipation, diarrhea, nausea and vomiting  Endocrine: Negative for cold intolerance, heat intolerance and polyuria  Genitourinary: Negative for difficulty urinating and dysuria  Musculoskeletal: Positive for gait problem   Negative for arthralgias, back pain and myalgias  Skin: Negative for rash  Neurological: Negative for dizziness and headaches  Psychiatric/Behavioral: Negative for agitation  Objective:      /66 (BP Location: Left arm, Patient Position: Sitting, Cuff Size: Adult)   Pulse 90   Temp 98 3 °F (36 8 °C) (Oral)   Resp 20   Ht 5' 6" (1 676 m)   Wt 85 8 kg (189 lb 3 2 oz)   SpO2 96%   BMI 30 54 kg/m²          Physical Exam  Constitutional:       Appearance: He is well-developed  HENT:      Head: Normocephalic and atraumatic  Eyes:      General:         Right eye: No discharge  Left eye: No discharge  Conjunctiva/sclera: Conjunctivae normal    Neck:      Musculoskeletal: Normal range of motion and neck supple  No muscular tenderness  Cardiovascular:      Rate and Rhythm: Normal rate and regular rhythm  Heart sounds: Normal heart sounds  No murmur  No friction rub  No gallop  Pulmonary:      Effort: Pulmonary effort is normal  No respiratory distress  Breath sounds: Normal breath sounds  No wheezing or rales  Abdominal:      General: Bowel sounds are normal  There is no distension  Palpations: Abdomen is soft  Tenderness: There is no abdominal tenderness  Musculoskeletal:         General: No swelling, tenderness or deformity  Comments: Use cane   Lymphadenopathy:      Cervical: No cervical adenopathy  Neurological:      Mental Status: He is alert

## 2020-08-10 NOTE — ASSESSMENT & PLAN NOTE
FU cardiology from [facility]  Continue coumadin and beta blocker  Coumadin clinic from [facility] follows PT/INR

## 2020-08-10 NOTE — ASSESSMENT & PLAN NOTE
Lab Results   Component Value Date    HGBA1C 7 8 12/03/2019     Will labs at [place] next week  FU endocrinology at [place]  Low carb diet  Continue metformin and novolin

## 2020-09-22 DIAGNOSIS — E78.2 MIXED HYPERLIPIDEMIA: ICD-10-CM

## 2020-09-22 RX ORDER — ATORVASTATIN CALCIUM 40 MG/1
40 TABLET, FILM COATED ORAL DAILY
Qty: 90 TABLET | Refills: 3 | OUTPATIENT
Start: 2020-09-22

## 2020-09-29 LAB — HBA1C MFR BLD HPLC: 7.1 %

## 2020-10-09 ENCOUNTER — OFFICE VISIT (OUTPATIENT)
Dept: URGENT CARE | Facility: CLINIC | Age: 80
End: 2020-10-09
Payer: COMMERCIAL

## 2020-10-09 VITALS
BODY MASS INDEX: 30.53 KG/M2 | HEIGHT: 66 IN | RESPIRATION RATE: 20 BRPM | WEIGHT: 190 LBS | DIASTOLIC BLOOD PRESSURE: 68 MMHG | TEMPERATURE: 98.1 F | SYSTOLIC BLOOD PRESSURE: 136 MMHG | HEART RATE: 88 BPM

## 2020-10-09 DIAGNOSIS — S69.90XA INJURY OF HAND, UNSPECIFIED LATERALITY, INITIAL ENCOUNTER: Primary | ICD-10-CM

## 2020-10-09 PROCEDURE — 99213 OFFICE O/P EST LOW 20 MIN: CPT | Performed by: PHYSICIAN ASSISTANT

## 2020-10-12 ENCOUNTER — TELEPHONE (OUTPATIENT)
Dept: NEPHROLOGY | Facility: CLINIC | Age: 80
End: 2020-10-12

## 2020-10-15 ENCOUNTER — TELEPHONE (OUTPATIENT)
Dept: NEPHROLOGY | Facility: CLINIC | Age: 80
End: 2020-10-15

## 2020-10-23 ENCOUNTER — OFFICE VISIT (OUTPATIENT)
Dept: NEPHROLOGY | Facility: CLINIC | Age: 80
End: 2020-10-23
Payer: COMMERCIAL

## 2020-10-23 VITALS
WEIGHT: 188.4 LBS | HEIGHT: 66 IN | DIASTOLIC BLOOD PRESSURE: 72 MMHG | HEART RATE: 74 BPM | BODY MASS INDEX: 30.28 KG/M2 | SYSTOLIC BLOOD PRESSURE: 139 MMHG | TEMPERATURE: 97.6 F

## 2020-10-23 DIAGNOSIS — Z79.4 TYPE 2 DIABETES MELLITUS WITH DIABETIC POLYNEUROPATHY, WITH LONG-TERM CURRENT USE OF INSULIN (HCC): ICD-10-CM

## 2020-10-23 DIAGNOSIS — E11.42 TYPE 2 DIABETES MELLITUS WITH DIABETIC POLYNEUROPATHY, WITH LONG-TERM CURRENT USE OF INSULIN (HCC): ICD-10-CM

## 2020-10-23 DIAGNOSIS — I73.9 PERIPHERAL VASCULAR DISEASE (HCC): ICD-10-CM

## 2020-10-23 DIAGNOSIS — N18.31 STAGE 3A CHRONIC KIDNEY DISEASE (HCC): Primary | ICD-10-CM

## 2020-10-23 DIAGNOSIS — R31.29 MICROSCOPIC HEMATURIA: ICD-10-CM

## 2020-10-23 DIAGNOSIS — I77.9 BILATERAL CAROTID ARTERY DISEASE, UNSPECIFIED TYPE (HCC): ICD-10-CM

## 2020-10-23 DIAGNOSIS — E66.9 OBESITY (BMI 30-39.9): ICD-10-CM

## 2020-10-23 DIAGNOSIS — I63.9 CEREBROVASCULAR ACCIDENT (CVA), UNSPECIFIED MECHANISM (HCC): ICD-10-CM

## 2020-10-23 DIAGNOSIS — R80.9 MICROALBUMINURIA: ICD-10-CM

## 2020-10-23 DIAGNOSIS — E78.5 HYPERLIPIDEMIA, UNSPECIFIED HYPERLIPIDEMIA TYPE: ICD-10-CM

## 2020-10-23 PROCEDURE — 99214 OFFICE O/P EST MOD 30 MIN: CPT | Performed by: INTERNAL MEDICINE

## 2020-12-05 ENCOUNTER — HOSPITAL ENCOUNTER (OUTPATIENT)
Dept: RADIOLOGY | Facility: HOSPITAL | Age: 80
Discharge: HOME | End: 2020-12-05
Payer: COMMERCIAL

## 2020-12-05 DIAGNOSIS — N20.0 NEPHROLITHIASIS: ICD-10-CM

## 2020-12-05 PROCEDURE — 74018 RADEX ABDOMEN 1 VIEW: CPT

## 2020-12-22 DIAGNOSIS — E78.2 MIXED HYPERLIPIDEMIA: ICD-10-CM

## 2020-12-22 DIAGNOSIS — I10 BENIGN ESSENTIAL HYPERTENSION: ICD-10-CM

## 2020-12-22 RX ORDER — AMLODIPINE BESYLATE 5 MG/1
5 TABLET ORAL DAILY
Qty: 90 TABLET | Refills: 3 | Status: SHIPPED | OUTPATIENT
Start: 2020-12-22 | End: 2021-12-07

## 2020-12-22 RX ORDER — ATORVASTATIN CALCIUM 40 MG/1
40 TABLET, FILM COATED ORAL DAILY
Qty: 90 TABLET | Refills: 3 | OUTPATIENT
Start: 2020-12-22

## 2020-12-28 DIAGNOSIS — E11.8 TYPE 2 DIABETES MELLITUS WITH COMPLICATION, WITHOUT LONG-TERM CURRENT USE OF INSULIN (HCC): ICD-10-CM

## 2021-01-13 ENCOUNTER — TELEPHONE (OUTPATIENT)
Dept: FAMILY MEDICINE CLINIC | Facility: CLINIC | Age: 81
End: 2021-01-13

## 2021-01-13 NOTE — TELEPHONE ENCOUNTER
Pt's daughter would like to be advised if her dad should receive the covid vaccine if he is allergic to the flu shot

## 2021-01-14 ENCOUNTER — TELEPHONE (OUTPATIENT)
Dept: FAMILY MEDICINE CLINIC | Facility: CLINIC | Age: 81
End: 2021-01-14

## 2021-01-14 NOTE — TELEPHONE ENCOUNTER
We still recommend Covid19 vaccine if allergic to the flu shot  He needs to stay 30 min instead of 15 min to watch for allergic reaction

## 2021-01-14 NOTE — TELEPHONE ENCOUNTER
Patient is scheduled for his first COVID vaccine in February-is it safe for him to get it? Please call patient at [phone]

## 2021-01-20 ENCOUNTER — TELEPHONE (OUTPATIENT)
Dept: FAMILY MEDICINE CLINIC | Facility: CLINIC | Age: 81
End: 2021-01-20

## 2021-01-20 DIAGNOSIS — Z79.4 TYPE 2 DIABETES MELLITUS WITH DIABETIC POLYNEUROPATHY, WITH LONG-TERM CURRENT USE OF INSULIN (HCC): Primary | ICD-10-CM

## 2021-01-20 DIAGNOSIS — E11.42 TYPE 2 DIABETES MELLITUS WITH DIABETIC POLYNEUROPATHY, WITH LONG-TERM CURRENT USE OF INSULIN (HCC): Primary | ICD-10-CM

## 2021-01-20 RX ORDER — FLASH GLUCOSE SENSOR
KIT MISCELLANEOUS
Qty: 1 EACH | Refills: 1 | Status: SHIPPED | OUTPATIENT
Start: 2021-01-20 | End: 2022-01-26 | Stop reason: SDUPTHER

## 2021-01-20 NOTE — TELEPHONE ENCOUNTER
Patient is requesting a Tapt[employer] system  Due to the not wanting to pick his finger every day  He says he called insurance and they should cover it   Please advise

## 2021-01-22 ENCOUNTER — IMMUNIZATIONS (OUTPATIENT)
Dept: FAMILY MEDICINE CLINIC | Facility: HOSPITAL | Age: 81
End: 2021-01-22

## 2021-01-22 DIAGNOSIS — Z23 ENCOUNTER FOR IMMUNIZATION: Primary | ICD-10-CM

## 2021-01-22 PROCEDURE — 91301 SARS-COV-2 / COVID-19 MRNA VACCINE (MODERNA) 100 MCG: CPT | Performed by: FAMILY MEDICINE

## 2021-01-22 PROCEDURE — 0011A SARS-COV-2 / COVID-19 MRNA VACCINE (MODERNA) 100 MCG: CPT | Performed by: FAMILY MEDICINE

## 2021-01-28 ENCOUNTER — VBI (OUTPATIENT)
Dept: ADMINISTRATIVE | Facility: OTHER | Age: 81
End: 2021-01-28

## 2021-02-04 ENCOUNTER — TELEPHONE (OUTPATIENT)
Dept: UROLOGY | Facility: AMBULATORY SURGERY CENTER | Age: 81
End: 2021-02-04
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
## 2021-02-15 ENCOUNTER — OFFICE VISIT (OUTPATIENT)
Dept: FAMILY MEDICINE CLINIC | Facility: CLINIC | Age: 81
End: 2021-02-15
Payer: COMMERCIAL

## 2021-02-15 VITALS
RESPIRATION RATE: 20 BRPM | HEIGHT: 63 IN | HEART RATE: 92 BPM | SYSTOLIC BLOOD PRESSURE: 140 MMHG | WEIGHT: 183.6 LBS | BODY MASS INDEX: 32.53 KG/M2 | DIASTOLIC BLOOD PRESSURE: 72 MMHG | TEMPERATURE: 97.7 F | OXYGEN SATURATION: 96 %

## 2021-02-15 DIAGNOSIS — I10 ESSENTIAL HYPERTENSION: ICD-10-CM

## 2021-02-15 DIAGNOSIS — Z79.4 TYPE 2 DIABETES MELLITUS WITH DIABETIC POLYNEUROPATHY, WITH LONG-TERM CURRENT USE OF INSULIN (HCC): Primary | ICD-10-CM

## 2021-02-15 DIAGNOSIS — Z00.00 MEDICARE ANNUAL WELLNESS VISIT, SUBSEQUENT: ICD-10-CM

## 2021-02-15 DIAGNOSIS — I48.91 ATRIAL FIBRILLATION, UNSPECIFIED TYPE (HCC): ICD-10-CM

## 2021-02-15 DIAGNOSIS — E11.42 TYPE 2 DIABETES MELLITUS WITH DIABETIC POLYNEUROPATHY, WITH LONG-TERM CURRENT USE OF INSULIN (HCC): Primary | ICD-10-CM

## 2021-02-15 DIAGNOSIS — E66.9 OBESITY (BMI 30-39.9): ICD-10-CM

## 2021-02-15 DIAGNOSIS — E78.5 HYPERLIPIDEMIA, UNSPECIFIED HYPERLIPIDEMIA TYPE: ICD-10-CM

## 2021-02-15 PROCEDURE — 3288F FALL RISK ASSESSMENT DOCD: CPT | Performed by: FAMILY MEDICINE

## 2021-02-15 PROCEDURE — 3077F SYST BP >= 140 MM HG: CPT | Performed by: FAMILY MEDICINE

## 2021-02-15 PROCEDURE — 3725F SCREEN DEPRESSION PERFORMED: CPT | Performed by: FAMILY MEDICINE

## 2021-02-15 PROCEDURE — 1036F TOBACCO NON-USER: CPT | Performed by: FAMILY MEDICINE

## 2021-02-15 PROCEDURE — 1160F RVW MEDS BY RX/DR IN RCRD: CPT | Performed by: FAMILY MEDICINE

## 2021-02-15 PROCEDURE — 99214 OFFICE O/P EST MOD 30 MIN: CPT | Performed by: FAMILY MEDICINE

## 2021-02-15 PROCEDURE — 1125F AMNT PAIN NOTED PAIN PRSNT: CPT | Performed by: FAMILY MEDICINE

## 2021-02-15 PROCEDURE — G0439 PPPS, SUBSEQ VISIT: HCPCS | Performed by: FAMILY MEDICINE

## 2021-02-15 PROCEDURE — 3078F DIAST BP <80 MM HG: CPT | Performed by: FAMILY MEDICINE

## 2021-02-15 PROCEDURE — 1170F FXNL STATUS ASSESSED: CPT | Performed by: FAMILY MEDICINE

## 2021-02-15 PROCEDURE — 1101F PT FALLS ASSESS-DOCD LE1/YR: CPT | Performed by: FAMILY MEDICINE

## 2021-02-15 NOTE — ASSESSMENT & PLAN NOTE
Lab Results   Component Value Date    HGBA1C 7 1 09/29/2020     Improved  Low carb diet  Continue metformin and novolin

## 2021-02-15 NOTE — PROGRESS NOTES
Chief Complaint   Patient presents with   [facility] Wellness Visit     Subsequent   Follow-up     6 months  Health Maintenance   Topic Date Due    SLP PLAN OF CARE  1940    DTaP,Tdap,and Td Vaccines (1 - Tdap) 10/14/1961    Medicare Annual Wellness Visit (AWV)  05/26/2017    DM Eye Exam  07/27/2020    BMI: Followup Plan  08/02/2020    Influenza Vaccine (1) 09/01/2020    Colonoscopy Surveillance  09/08/2020    URINE MICROALBUMIN  09/26/2020    COVID-19 Vaccine (2 of 2 - Moderna series) 02/19/2021    Depression Screening PHQ  08/10/2021    Fall Risk  08/10/2021    HEMOGLOBIN A1C  09/29/2021    BMI: Adult  10/23/2021    Diabetic Foot Exam  12/09/2021    Pneumococcal Vaccine: 65+ Years  Completed    HIB Vaccine  Aged Out    Hepatitis B Vaccine  Aged Out    IPV Vaccine  Aged Out    Hepatitis A Vaccine  Aged Out    Meningococcal ACWY Vaccine  Aged Out    HPV Vaccine  Aged Out        Assessment and Plan:     Problem List Items Addressed This Visit        Endocrine    Type 2 diabetes mellitus with diabetic polyneuropathy, with long-term current use of insulin ([address] )      Other Visit Diagnoses     Encounter for immunization            BMI Counseling: Body mass index is 32 52 kg/m²  The BMI is above normal  Nutrition recommendations include decreasing portion sizes, encouraging healthy choices of fruits and vegetables, decreasing fast food intake, consuming healthier snacks and limiting drinks that contain sugar  No pharmacotherapy was ordered  Preventive health issues were discussed with patient, and age appropriate screening tests were ordered as noted in patient's After Visit Summary  Personalized health advice and appropriate referrals for health education or preventive services given if needed, as noted in patient's After Visit Summary       History of Present Illness:     Patient presents for Medicare Annual Wellness visit    Patient Care Team:  Deepak Ramos MD as PCP - MD Chelsey Gong MD Dorinda Clark, MD     Problem List:     Patient Active Problem List   Diagnosis    Weakness    Type 2 diabetes mellitus with diabetic polyneuropathy, with long-term current use of insulin ([facility] [address] )    Coronary artery disease    Hyperlipidemia    Bilateral carotid artery disease ([address] )    Cerebrovascular accident ([facility] [address] )    Abdominal bruit    Actinic keratosis    Allergic rhinitis    Essential hypertension    Chronic kidney disease, stage 3    CAD (coronary artery disease) of artery bypass graft    Erectile dysfunction of non-organic origin    Gait instability    Left low back pain    Myelomalacia ([address] )    Myofascial pain    Nephrolithiasis    Obstructive sleep apnea    Osteoarthritis    Other specified forms of chronic ischemic heart disease    Atrial fibrillation ([facility] [address] )    Peripheral vascular disease ([facility] [address] )    Pulmonary nodule seen on imaging study    S/P cardiac pacemaker procedure    Tachy-brice syndrome ([address] )    History of renal calculi    Subacromial bursitis of left shoulder joint    Rotator cuff tendinitis, left    Obesity (BMI 30-39  9)    Microalbuminuria    Memory loss    Sensorineural hearing loss (SNHL) of both ears    Microscopic hematuria      Past Medical and Surgical History:     Past Medical History:   Diagnosis Date    Arthritis     Carotid stenosis     Coronary artery disease     Last Assessed:  11/5/13    CVA (cerebral vascular accident) [facility])     Last Assessed:  1/10/18    Diabetes mellitus ([facility] [address] )     Controlled with kidney complication, Last Assessed:  1/10/18    Hematuria     Hyperlipidemia     Hypertension     Kidney stone     Renal disorder     Trigger finger     Vitamin D deficiency      Past Surgical History:   Procedure Laterality Date    CARDIAC SURGERY      CIRCUMCISION      Resolved:  1964, Elective    COLONOSCOPY      Resolved:  2006    CORONARY ARTERY BYPASS GRAFT  2000    6 vesels    CYSTOSCOPY Resolved:  4/9/10, Diagnostic    CYSTOSCOPY  12/14/2018    Dr Tk Modi [place] INJECTION LEFT SHOULDER (ARTHROGRAM)  7/31/2019    FRACTURE SURGERY      left forearm fx with plate repair   HUMERUS FRACTURE SURGERY      Open, Greater tuberosity    LITHOTRIPSY  09/12/2013    Renal    NEPHROSTOMY Bilateral     With drainage    VASECTOMY        Family History:     Family History   Problem Relation Age of Onset    Heart attack Mother     Coronary artery disease Mother     Emphysema Father     Cancer Brother         Leukemia    Hypertension Son     Diabetes Maternal Grandfather     Hypertension Other     Other Other         Cerebrovascular accident (CVA)      Social History:        Social History     Socioeconomic History    Marital status:      Spouse name: Not on file    Number of children: Not on file    Years of education: Not on file    Highest education level: Not on file   Occupational History    Not on file   Social Needs    Financial resource strain: Not on file    Food insecurity     Worry: Not on file     Inability: Not on file   [employer] needs     Medical: Not on file     Non-medical: Not on file   Tobacco Use    Smoking status: Former Smoker     Packs/day: 1 50     Years: 40 00     Pack years: 60 00     Types: Cigarettes    Smokeless tobacco: Never Used    Tobacco comment: quit 30 years ago     Substance and Sexual Activity    Alcohol use: No    Drug use: No    Sexual activity: Not on file   Lifestyle    Physical activity     Days per week: Not on file     Minutes per session: Not on file    Stress: Not on file   Relationships    Social connections     Talks on phone: Not on file     Gets together: Not on file     Attends [employer] service: Not on file     Active member of club or organization: Not on file     Attends meetings of clubs or organizations: Not on file     Relationship status: Not on file    Intimate partner violence Fear of current or ex partner: Not on file     Emotionally abused: Not on file     Physically abused: Not on file     Forced sexual activity: Not on file   Other Topics Concern    Not on file   Social History Narrative    Daily caffeine consumption, 1 serving a day      Medications and Allergies:     Current Outpatient Medications   Medication Sig Dispense Refill    amLODIPine (NORVASC) 5 mg tablet Take 1 tablet (5 mg total) by mouth daily 90 tablet 3    atorvastatin (LIPITOR) 40 mg tablet TAKE 1 TABLET BY MOUTH EVERY DAY 90 tablet 3    Continuous Blood Gluc Sensor (FreeStyle Iekr 14 Day Sensor) MISC Use as instructed 1 each 1    cyanocobalamin 1000 MCG tablet Take 1 tablet by mouth daily 30 tablet 3    insulin aspart (NOVOLOG FLEXPEN) 100 Units/mL injection pen Novolog Flexpen U-100 Insulin   2-8 units daily      metFORMIN (GLUCOPHAGE) 500 mg tablet TAKE 1 TABLET (500 MG TOTAL) BY MOUTH EVERY 12 (TWELVE) HOURS FOR 30 DAYS 180 tablet 1    metoprolol tartrate (LOPRESSOR) 50 mg tablet Take 50 mg by mouth 2 (two) times a day      Misc  Devices (ROLLATOR ULTRA-LIGHT) MISC by Does not apply route daily 1 each 0    NOVOLIN N 100 UNIT/ML subcutaneous injection 12 units in the morning and 22 units in the evening (Patient taking differently: 30 units in the morning and 30 units in the evening) 10 mL 0    warfarin (COUMADIN) 5 mg tablet Take 5 mg by mouth daily        No current facility-administered medications for this visit        Allergies   Allergen Reactions    Flu Virus Vaccine Rash    Haemophilus Influenzae Rash      Immunizations:     Immunization History   Administered Date(s) Administered    INFLUENZA 11/01/2006    Pneumococcal Conjugate 13-Valent 05/05/2015    Pneumococcal Polysaccharide PPV23 11/09/2006, 05/26/2016    SARS-CoV-2 / COVID-19 mRNA IM (Moderna) 01/22/2021      Health Maintenance:         Topic Date Due    Colonoscopy Surveillance  09/08/2020         Topic Date Due    DTaP,Tdap,and Td Vaccines (1 - Tdap) 10/14/1961    Influenza Vaccine (1) 09/01/2020      Medicare Health Risk Assessment:     /72 (BP Location: Left arm, Patient Position: Sitting, Cuff Size: Adult)   Pulse 92   Temp 97 7 °F (36 5 °C) (Temporal)   Resp 20   Ht 5' 3" (1 6 m)   Wt 83 3 kg (183 lb 9 6 oz)   SpO2 96%   BMI 32 52 kg/m²      Alleen Smoker is here for his Subsequent Wellness visit  Health Risk Assessment:   Patient rates overall health as good  Patient feels that their physical health rating is same  Eyesight was rated as same  Hearing was rated as same  Patient feels that their emotional and mental health rating is same  Pain experienced in the last 7 days has been some  Patient's pain rating has been 5/10  Depression Screening:   PHQ-2 Score: 0      Fall Risk Screening: In the past year, patient has experienced: no history of falling in past year      Home Safety:  Patient does not have trouble with stairs inside or outside of their home  Patient has working smoke alarms Home safety hazards include: none  Nutrition:   Current diet is Diabetic and No Added Salt  Medications:   Patient is not currently taking any over-the-counter supplements  Patient is able to manage medications  Activities of Daily Living (ADLs)/Instrumental Activities of Daily Living (IADLs):   Walk and transfer into and out of bed and chair?: Yes  Dress and groom yourself?: Yes    Bathe or shower yourself?: Yes    Feed yourself? Yes  Do your laundry/housekeeping?: Yes  Manage your money, pay your bills and track your expenses?: Yes  Make your own meals?: Yes    Do your own shopping?: Yes    Previous Hospitalizations:   Any hospitalizations or ED visits within the last 12 months?: No      Advance Care Planning:   Living will: Yes    Durable POA for healthcare:  Yes    Advanced directive: Yes    End of Life Decisions reviewed with patient: Yes    Provider agrees with end of life decisions: Yes      Cognitive Screening: Provider or family/friend/caregiver concerned regarding cognition?: No    PREVENTIVE SCREENINGS      Cardiovascular Screening:    General: History Lipid Disorder and Screening Current      Diabetes Screening:     General: History Diabetes and Screening Current      Colorectal Cancer Screening:     General: Screening Not Indicated      Prostate Cancer Screening:    General: Screening Not Indicated      Abdominal Aortic Aneurysm (AAA) Screening:    Risk factors include: tobacco use        Lung Cancer Screening:     General: Screening Not Indicated      Latrell Goode MD

## 2021-02-15 NOTE — PROGRESS NOTES
Assessment/Plan:    Type 2 diabetes mellitus with diabetic polyneuropathy, with long-term current use of insulin (HCC)    Lab Results   Component Value Date    HGBA1C 7 1 09/29/2020     Improved  Low carb diet  Continue metformin and novolin  Essential hypertension  Controlled  DASH diet  Continue amlodipine 5mg QD  Atrial fibrillation (HCC)  Continue coumadin  Cardiology follows PT/INR  Hyperlipidemia  9/2020 Lipid 150/131/35/92 ok  Low fat diet  Continue atorvastatin 40mg qhs  Refused flu shot because he is allergic to it    Got PCV 13 in 2015 and pneumovax in 2016    Got Covid19 vaccine 1st dose  NO side effects  Fall precautions    RTO in 6 months      POA---daughter   Living will-----DNR if end of life  Diagnoses and all orders for this visit:    Type 2 diabetes mellitus with diabetic polyneuropathy, with long-term current use of insulin ([facility] [address] )    Essential hypertension    Atrial fibrillation, unspecified type (HCC)    Hyperlipidemia, unspecified hyperlipidemia type    Obesity (BMI 30-39  9)    Medicare annual wellness visit, subsequent    Other orders  -     Cancel: TDAP VACCINE GREATER THAN OR EQUAL TO 8YO IM  -     Cancel: Microalbumin / creatinine urine ratio ([facility]); Future  -     Cancel: Ambulatory referral for Colonoscopy; Future  -     Cancel: influenza vaccine, high-dose, PF 0 7 mL (FLUZONE HIGH-DOSE)          Subjective:      Patient ID: Christa Stovall is a [de-identified] y o  male  HPI    Pt is here by himself  Had skin lesion on scalp removed last week per dermatology  DM---for more than 10 years  9/2020 hgA1C 7 1 improved  Pt states he used almond milk instead of milk recently  [facility] [place] 6 [place]    He is on meformin 500mg bid and novolin 30u bid    Blood sugar at home 100-150 per pt  Had foot neuropathy  Use walker/cane    Pt states he does not need eye exam since cataract surgery     FU podiatry Q 3 months from [address] [place] [address]      HTN----He is on amlodipine 5mg QD  BP at home 110/70-80 per pt    Hx of CVA in 3/2017  Right leg feels weakness  Use walker which helped  Afib---He is on coumadin  Cardiology follows PT/INR  Had it done today per pt  S/p frankie 11/2017  FU cardiology from [facility] regularly  DWIGHT---He does not use CPAP per pt       Hyperlipidemia---Pt is on atorvastatin 40mg qhs  Denies side effects       Lives by himself  Walks with walker/cane  Does all ADL's  Denies recent falls  Denies depression        The following portions of the patient's history were reviewed and updated as appropriate: allergies, current medications, past family history, past medical history, past social history, past surgical history and problem list     Review of Systems   Constitutional: Negative for appetite change, chills and fever  HENT: Negative for congestion, ear pain, sinus pain and sore throat  Eyes: Negative for discharge and itching  Respiratory: Negative for apnea, cough, chest tightness, shortness of breath and wheezing  Cardiovascular: Negative for chest pain, palpitations and leg swelling  Gastrointestinal: Negative for abdominal pain, anal bleeding, constipation, diarrhea, nausea and vomiting  Endocrine: Negative for cold intolerance, heat intolerance and polyuria  Genitourinary: Negative for difficulty urinating and dysuria  Musculoskeletal: Positive for gait problem  Negative for arthralgias, back pain and myalgias  Skin: Negative for rash  Neurological: Negative for dizziness and headaches  Psychiatric/Behavioral: Negative for agitation  Objective:      /72 (BP Location: Left arm, Patient Position: Sitting, Cuff Size: Adult)   Pulse 92   Temp 97 7 °F (36 5 °C) (Temporal)   Resp 20   Ht 5' 3" (1 6 m)   Wt 83 3 kg (183 lb 9 6 oz)   SpO2 96%   BMI 32 52 kg/m²          Physical Exam  Constitutional:       Appearance: He is well-developed  HENT:      Head: Normocephalic and atraumatic     Eyes: General:         Right eye: No discharge  Left eye: No discharge  Conjunctiva/sclera: Conjunctivae normal    Neck:      Musculoskeletal: Normal range of motion and neck supple  Cardiovascular:      Rate and Rhythm: Normal rate and regular rhythm  Heart sounds: Normal heart sounds  No murmur  No friction rub  No gallop  Pulmonary:      Effort: Pulmonary effort is normal  No respiratory distress  Breath sounds: Normal breath sounds  No wheezing or rales  Abdominal:      General: Bowel sounds are normal  There is no distension  Palpations: Abdomen is soft  Tenderness: There is no abdominal tenderness  There is no guarding  Musculoskeletal:         General: No swelling, tenderness or deformity  Comments: Use walker   Neurological:      Mental Status: He is alert

## 2021-02-15 NOTE — ASSESSMENT & PLAN NOTE
9/2020 Lipid 150/131/35/92 ok  Low fat diet   Continue atorvastatin 40mg qhs  negative No joint pain, swelling or deformity; no limitation of movement

## 2021-02-20 ENCOUNTER — IMMUNIZATIONS (OUTPATIENT)
Dept: FAMILY MEDICINE CLINIC | Facility: HOSPITAL | Age: 81
End: 2021-02-20

## 2021-02-20 DIAGNOSIS — Z23 ENCOUNTER FOR IMMUNIZATION: Primary | ICD-10-CM

## 2021-02-20 PROCEDURE — 0012A SARS-COV-2 / COVID-19 MRNA VACCINE (MODERNA) 100 MCG: CPT

## 2021-02-20 PROCEDURE — 91301 SARS-COV-2 / COVID-19 MRNA VACCINE (MODERNA) 100 MCG: CPT

## 2021-04-19 DIAGNOSIS — E78.2 MIXED HYPERLIPIDEMIA: ICD-10-CM

## 2021-04-19 RX ORDER — ATORVASTATIN CALCIUM 40 MG/1
TABLET, FILM COATED ORAL
Qty: 90 TABLET | Refills: 3 | Status: SHIPPED | OUTPATIENT
Start: 2021-04-19 | End: 2022-04-20

## 2021-04-30 LAB
LEFT EYE DIABETIC RETINOPATHY: NORMAL
RIGHT EYE DIABETIC RETINOPATHY: NORMAL

## 2021-06-16 NOTE — TELEPHONE ENCOUNTER
Patient phoned with the following BP Readings:  3/5//64-3:30 p m ; 3/12//58-11:00 a m ; 3/16//57-9:00 a m ; 3/24//59-8:25 a m ; 3/27//71-10:45 a m  Should patient adjust the amount of amLODIPine (5mg, 1x daily) he takes daily? Please call patient at [phone]  

## 2021-08-02 ENCOUNTER — OFFICE VISIT (OUTPATIENT)
Dept: FAMILY MEDICINE CLINIC | Facility: CLINIC | Age: 81
End: 2021-08-02
Payer: COMMERCIAL

## 2021-08-02 VITALS
DIASTOLIC BLOOD PRESSURE: 80 MMHG | TEMPERATURE: 99 F | WEIGHT: 186 LBS | HEART RATE: 84 BPM | SYSTOLIC BLOOD PRESSURE: 140 MMHG | RESPIRATION RATE: 16 BRPM | BODY MASS INDEX: 32.96 KG/M2 | HEIGHT: 63 IN

## 2021-08-02 DIAGNOSIS — E11.42 TYPE 2 DIABETES MELLITUS WITH DIABETIC POLYNEUROPATHY, WITH LONG-TERM CURRENT USE OF INSULIN (HCC): ICD-10-CM

## 2021-08-02 DIAGNOSIS — M62.838 MUSCLE SPASM: Primary | ICD-10-CM

## 2021-08-02 DIAGNOSIS — I48.91 ATRIAL FIBRILLATION, UNSPECIFIED TYPE (HCC): ICD-10-CM

## 2021-08-02 DIAGNOSIS — R26.81 GAIT INSTABILITY: ICD-10-CM

## 2021-08-02 DIAGNOSIS — Z79.4 TYPE 2 DIABETES MELLITUS WITH DIABETIC POLYNEUROPATHY, WITH LONG-TERM CURRENT USE OF INSULIN (HCC): ICD-10-CM

## 2021-08-02 DIAGNOSIS — E78.5 HYPERLIPIDEMIA, UNSPECIFIED HYPERLIPIDEMIA TYPE: ICD-10-CM

## 2021-08-02 DIAGNOSIS — E66.9 OBESITY (BMI 30-39.9): ICD-10-CM

## 2021-08-02 DIAGNOSIS — I10 ESSENTIAL HYPERTENSION: ICD-10-CM

## 2021-08-02 LAB — SL AMB POCT HEMOGLOBIN AIC: 6.9 (ref ?–6.5)

## 2021-08-02 PROCEDURE — 1101F PT FALLS ASSESS-DOCD LE1/YR: CPT | Performed by: FAMILY MEDICINE

## 2021-08-02 PROCEDURE — 3079F DIAST BP 80-89 MM HG: CPT | Performed by: FAMILY MEDICINE

## 2021-08-02 PROCEDURE — 83036 HEMOGLOBIN GLYCOSYLATED A1C: CPT | Performed by: FAMILY MEDICINE

## 2021-08-02 PROCEDURE — 99214 OFFICE O/P EST MOD 30 MIN: CPT | Performed by: FAMILY MEDICINE

## 2021-08-02 PROCEDURE — 1036F TOBACCO NON-USER: CPT | Performed by: FAMILY MEDICINE

## 2021-08-02 PROCEDURE — 3725F SCREEN DEPRESSION PERFORMED: CPT | Performed by: FAMILY MEDICINE

## 2021-08-02 PROCEDURE — 3288F FALL RISK ASSESSMENT DOCD: CPT | Performed by: FAMILY MEDICINE

## 2021-08-02 PROCEDURE — 3077F SYST BP >= 140 MM HG: CPT | Performed by: FAMILY MEDICINE

## 2021-08-02 PROCEDURE — 1160F RVW MEDS BY RX/DR IN RCRD: CPT | Performed by: FAMILY MEDICINE

## 2021-08-02 RX ORDER — METHOCARBAMOL 750 MG/1
750 TABLET, FILM COATED ORAL EVERY 6 HOURS PRN
Qty: 30 TABLET | Refills: 0 | Status: SHIPPED | OUTPATIENT
Start: 2021-08-02 | End: 2022-08-04 | Stop reason: ALTCHOICE

## 2021-08-02 RX ORDER — METHOCARBAMOL 750 MG/1
TABLET, FILM COATED ORAL
COMMUNITY
Start: 2021-07-18 | End: 2021-08-02 | Stop reason: SDUPTHER

## 2021-08-02 NOTE — PROGRESS NOTES
Chief Complaint   Patient presents with    Follow-up     ed follow up- for neck pain muscle spams     Health Maintenance   Topic Date Due    SLP PLAN OF CARE  Never done    DTaP,Tdap,and Td Vaccines (1 - Tdap) Never done    Colorectal Cancer Screening  09/08/2020    URINE MICROALBUMIN  09/26/2020    Influenza Vaccine (1) 09/01/2021    Fall Risk  02/15/2022    Medicare Annual Wellness Visit (AWV)  02/15/2022    BMI: Followup Plan  02/15/2022    BMI: Adult  02/15/2022    Diabetic Foot Exam  07/02/2022    Depression Screening PHQ  08/02/2022    HEMOGLOBIN A1C  08/02/2022    DM Eye Exam  04/30/2023    Pneumococcal Vaccine: 65+ Years  Completed    COVID-19 Vaccine  Completed    HIB Vaccine  Aged Out    Hepatitis B Vaccine  Aged Out    IPV Vaccine  Aged Out    Hepatitis A Vaccine  Aged Out    Meningococcal ACWY Vaccine  Aged Out    HPV Vaccine  Aged Out       Assessment/Plan:    Essential hypertension  Controlled  DASH diet  Continue amlodipine 5mg Qd  Atrial fibrillation [facility])  FU cardiology regularly  Continue coumadin  Hyperlipidemia  Low fat diet  Continue atorvastatin 40mg qhs  Type 2 diabetes mellitus with diabetic polyneuropathy, with long-term current use of insulin ([facility])    Lab Results   Component Value Date    HGBA1C 6 9 (A) 08/02/2021     Controlled  Low carb diet  Stop metformin because of side effects  Continue novolin 30u bid per [facility]ocr[facility]ology [facility]  Refused flu shot because he is allergic to it    Got PCV 13 in 2015 and pneumovax in 2016    Got Covid19 vaccines  Denies SE  Fall precautions    RTO in 6 months             Diagnoses and all orders for this visit:    Muscle spasm  -     methocarbamol (ROBAXIN) 750 mg tablet;  Take 1 tablet (750 mg total) by mouth every 6 (six) hours as needed for muscle spasms    Type 2 diabetes mellitus with diabetic polyneuropathy, with long-term current use of insulin (HCC)  -     POCT hemoglobin A1c    Essential hypertension    Atrial fibrillation, unspecified type ([facility] [address] )    Hyperlipidemia, unspecified hyperlipidemia type    Gait instability    Obesity (BMI 30-39  9)    Other orders  -     Discontinue: methocarbamol (ROBAXIN) 750 mg tablet; TAKE 1 TABLET (750 MG TOTAL) BY MOUTH 4 (FOUR) TIMES A DAY AS NEEDED FOR MUSCLE SPASMS  Subjective:      Patient ID: Dory Goins is a [de-identified] y o  male  HPI    Pt is here by himself  Neck pain---For a while  Worse if turn his head  Denies numbness/tingling of arms  Went to [facility] ER on 7/18/2021  CT of cervical spine showed "No evidence of acute fracture of cervical spine  Extensive degenerative change and spondylosis of cervical spine with multilevel spinal stenosis and neuroforaminal narrowing as described above  "  Got muscle relaxant methocarbamol 750mg Q 6 hours prn which helped  Need refills today  DM---for more than 10 years    No recent labs  FU endocrinology in [facility] 6 months  Next appt will be in 9/2021  He is on meformin 500mg bid but had diarrhea  He stopped it for 3 days and feels better  He is novolin 30u bid    Blood sugar at home 100-150 per pt    Had foot neuropathy  Use walker  FU ophthalmology Dr Thomas November on [facility]  Next appt will be 9/2021  FU podiatry Q 3 months from [address] [place] [address]      HTN----He is on amlodipine 5mg QD  BP at home 110/70-80 per pt    Hx of CVA in 3/2017  Right leg feels weakness  Use walker which helped  Afib---He is on coumadin  Cardiology follows PT/INR  7/20/2021 INR 2 8 good  S/p frankie 11/2017  FU cardiology from [facility] regularly  DWIGHT---He does not use CPAP per pt       Hyperlipidemia---Pt is on atorvastatin 40mg qhs  Denies side effects       Lives by himself  Walks with walker/cane  Does all ADL's  Denies recent falls     Denies depression          The following portions of the patient's history were reviewed and updated as appropriate: allergies, current medications, past family history, past medical history, past social history, past surgical history and problem list     Review of Systems   Constitutional: Negative for appetite change, chills and fever  HENT: Negative for congestion, ear pain, sinus pain and sore throat  Eyes: Negative for discharge and itching  Respiratory: Negative for apnea, cough, chest tightness, shortness of breath and wheezing  Cardiovascular: Negative for chest pain, palpitations and leg swelling  Gastrointestinal: Negative for abdominal pain, anal bleeding, constipation, diarrhea, nausea and vomiting  Endocrine: Negative for cold intolerance, heat intolerance and polyuria  Genitourinary: Negative for difficulty urinating and dysuria  Musculoskeletal: Negative for arthralgias, back pain and myalgias  Skin: Negative for rash  Neurological: Negative for dizziness and headaches  Psychiatric/Behavioral: Negative for agitation  Objective:      /80   Pulse 84   Temp 99 °F (37 2 °C) (Tympanic)   Resp 16   Ht 5' 3" (1 6 m)   Wt 84 4 kg (186 lb)   BMI 32 95 kg/m²          Physical Exam  Constitutional:       Appearance: He is well-developed  HENT:      Head: Normocephalic and atraumatic  Eyes:      Conjunctiva/sclera: Conjunctivae normal    Cardiovascular:      Rate and Rhythm: Normal rate and regular rhythm  Heart sounds: Normal heart sounds  No murmur heard  No friction rub  No gallop  Pulmonary:      Effort: Pulmonary effort is normal  No respiratory distress  Breath sounds: Normal breath sounds  No wheezing or rales  Abdominal:      General: Bowel sounds are normal  There is no distension  Palpations: Abdomen is soft  Tenderness: There is no abdominal tenderness  There is no guarding  Musculoskeletal:      Cervical back: Normal range of motion and neck supple  No tenderness  Right lower leg: No edema  Left lower leg: No edema  Comments: Use walker   Lymphadenopathy:      Cervical: No cervical adenopathy     Neurological: Mental Status: He is alert

## 2021-08-02 NOTE — ASSESSMENT & PLAN NOTE
Lab Results   Component Value Date    HGBA1C 6 9 (A) 08/02/2021     Controlled  Low carb diet  Stop metformin because of side effects  Continue novolin 30u bid per endocrinology at [place]

## 2021-08-13 ENCOUNTER — TELEPHONE (OUTPATIENT)
Dept: FAMILY MEDICINE CLINIC | Facility: CLINIC | Age: 81
End: 2021-08-13

## 2021-08-13 NOTE — TELEPHONE ENCOUNTER
Patient states Metformin was stopped because was causing diarrhea  Since then his blood sugars have been elevated  Blood sugars running around 154 in the morning and over 200 at night  Wants to know if he should increase his insulin

## 2021-08-13 NOTE — TELEPHONE ENCOUNTER
Please call patient  Increase Novolin Insulin to 32 units twice daily  Follow a low carb diet, avoid starchy foods, concentrated sweets  Follow-up with endocrinology at [facility] clinic next month

## 2021-08-16 ENCOUNTER — TELEPHONE (OUTPATIENT)
Dept: FAMILY MEDICINE CLINIC | Facility: CLINIC | Age: 81
End: 2021-08-16

## 2021-08-16 NOTE — TELEPHONE ENCOUNTER
Patient was told to stop Metformin because of diarrhea  States sugars elevated  Wants to continue this med      [facility]

## 2021-08-16 NOTE — TELEPHONE ENCOUNTER
I called and gave patient the recommendations, he will contact the [place] [facility] tomorrow

## 2021-08-23 NOTE — TELEPHONE ENCOUNTER
He would like to know if there is another medication that can replace the metformin he states BS is all over the place  Please advise

## 2021-08-24 NOTE — TELEPHONE ENCOUNTER
Tried to call pt but nobody answer  What is his blood sugar recently? Any symptoms? Did he follow low carb diet? When will he see [facility] endocrinology?

## 2021-08-25 NOTE — TELEPHONE ENCOUNTER
Tried to call pt twice, nobody answer  Advised pt to increase novolin to 33u in the afternoon with dinner   Keep same 30u in the morning with breakfast  Also, advised pt to talk to his [place] endocrinologist

## 2021-08-25 NOTE — TELEPHONE ENCOUNTER
Spoke with patient blood sugars are over 200s after 5 PM and over 100s this morning  Patient has no symptoms and is following a low carb diet states he eats what the facility provides  Patient has seen [place] recently and follow ups are every 3 months

## 2021-08-31 ENCOUNTER — TELEPHONE (OUTPATIENT)
Dept: FAMILY MEDICINE CLINIC | Facility: CLINIC | Age: 81
End: 2021-08-31

## 2021-08-31 NOTE — TELEPHONE ENCOUNTER
Spoke with pt and he stated that it did but he prefers to try again and he will deal if he develops Diarrhea again   [facility] [address]

## 2021-08-31 NOTE — TELEPHONE ENCOUNTER
Pt told me he had diarrhea when he was on metformin in office visit  He may try again if he would like to

## 2021-08-31 NOTE — TELEPHONE ENCOUNTER
Patient states that he would like to styart with Metformin again he states that the increase of the insulin has not helped and he felt better when he was taking metformin

## 2021-10-05 ENCOUNTER — TELEPHONE (OUTPATIENT)
Dept: NEPHROLOGY | Facility: CLINIC | Age: 81
End: 2021-10-05

## 2021-10-11 ENCOUNTER — OFFICE VISIT (OUTPATIENT)
Dept: NEPHROLOGY | Facility: CLINIC | Age: 81
End: 2021-10-11
Payer: COMMERCIAL

## 2021-10-11 VITALS
DIASTOLIC BLOOD PRESSURE: 65 MMHG | SYSTOLIC BLOOD PRESSURE: 122 MMHG | RESPIRATION RATE: 18 BRPM | HEIGHT: 63 IN | HEART RATE: 64 BPM | BODY MASS INDEX: 33.13 KG/M2 | WEIGHT: 187 LBS

## 2021-10-11 DIAGNOSIS — E66.9 OBESITY (BMI 30-39.9): ICD-10-CM

## 2021-10-11 DIAGNOSIS — N18.31 STAGE 3A CHRONIC KIDNEY DISEASE (HCC): Primary | ICD-10-CM

## 2021-10-11 DIAGNOSIS — Z79.4 TYPE 2 DIABETES MELLITUS WITH DIABETIC POLYNEUROPATHY, WITH LONG-TERM CURRENT USE OF INSULIN (HCC): ICD-10-CM

## 2021-10-11 DIAGNOSIS — Z87.442 HISTORY OF RENAL CALCULI: ICD-10-CM

## 2021-10-11 DIAGNOSIS — I10 ESSENTIAL HYPERTENSION: ICD-10-CM

## 2021-10-11 DIAGNOSIS — R80.9 MICROALBUMINURIA: ICD-10-CM

## 2021-10-11 DIAGNOSIS — N20.0 NEPHROLITHIASIS: ICD-10-CM

## 2021-10-11 DIAGNOSIS — E78.5 HYPERLIPIDEMIA, UNSPECIFIED HYPERLIPIDEMIA TYPE: ICD-10-CM

## 2021-10-11 DIAGNOSIS — E11.42 TYPE 2 DIABETES MELLITUS WITH DIABETIC POLYNEUROPATHY, WITH LONG-TERM CURRENT USE OF INSULIN (HCC): ICD-10-CM

## 2021-10-11 PROCEDURE — 3078F DIAST BP <80 MM HG: CPT | Performed by: INTERNAL MEDICINE

## 2021-10-11 PROCEDURE — 1036F TOBACCO NON-USER: CPT | Performed by: INTERNAL MEDICINE

## 2021-10-11 PROCEDURE — 99214 OFFICE O/P EST MOD 30 MIN: CPT | Performed by: INTERNAL MEDICINE

## 2021-10-11 PROCEDURE — 1160F RVW MEDS BY RX/DR IN RCRD: CPT | Performed by: INTERNAL MEDICINE

## 2021-10-11 PROCEDURE — 3074F SYST BP LT 130 MM HG: CPT | Performed by: INTERNAL MEDICINE

## 2021-11-06 ENCOUNTER — OFFICE VISIT (OUTPATIENT)
Dept: FAMILY MEDICINE CLINIC | Facility: CLINIC | Age: 81
End: 2021-11-06
Payer: COMMERCIAL

## 2021-11-06 VITALS
SYSTOLIC BLOOD PRESSURE: 146 MMHG | OXYGEN SATURATION: 96 % | TEMPERATURE: 98.5 F | WEIGHT: 190.8 LBS | RESPIRATION RATE: 20 BRPM | BODY MASS INDEX: 33.81 KG/M2 | HEIGHT: 63 IN | HEART RATE: 104 BPM | DIASTOLIC BLOOD PRESSURE: 72 MMHG

## 2021-11-06 DIAGNOSIS — S61.011D LACERATION OF RIGHT THUMB WITHOUT FOREIGN BODY WITHOUT DAMAGE TO NAIL, SUBSEQUENT ENCOUNTER: Primary | ICD-10-CM

## 2021-11-06 PROCEDURE — 99213 OFFICE O/P EST LOW 20 MIN: CPT | Performed by: FAMILY MEDICINE

## 2021-11-11 ENCOUNTER — OFFICE VISIT (OUTPATIENT)
Dept: FAMILY MEDICINE CLINIC | Facility: CLINIC | Age: 81
End: 2021-11-11
Payer: COMMERCIAL

## 2021-11-11 VITALS
HEART RATE: 88 BPM | BODY MASS INDEX: 34.41 KG/M2 | OXYGEN SATURATION: 96 % | DIASTOLIC BLOOD PRESSURE: 70 MMHG | HEIGHT: 63 IN | TEMPERATURE: 98.2 F | RESPIRATION RATE: 20 BRPM | SYSTOLIC BLOOD PRESSURE: 168 MMHG | WEIGHT: 194.2 LBS

## 2021-11-11 DIAGNOSIS — Z51.89 VISIT FOR WOUND CHECK: Primary | ICD-10-CM

## 2021-11-11 PROCEDURE — 1160F RVW MEDS BY RX/DR IN RCRD: CPT | Performed by: FAMILY MEDICINE

## 2021-11-11 PROCEDURE — 3077F SYST BP >= 140 MM HG: CPT | Performed by: FAMILY MEDICINE

## 2021-11-11 PROCEDURE — 1036F TOBACCO NON-USER: CPT | Performed by: FAMILY MEDICINE

## 2021-11-11 PROCEDURE — 99212 OFFICE O/P EST SF 10 MIN: CPT | Performed by: FAMILY MEDICINE

## 2021-11-11 PROCEDURE — 3078F DIAST BP <80 MM HG: CPT | Performed by: FAMILY MEDICINE

## 2021-11-30 ENCOUNTER — VBI (OUTPATIENT)
Dept: ADMINISTRATIVE | Facility: OTHER | Age: 81
End: 2021-11-30

## 2022-01-26 ENCOUNTER — TELEPHONE (OUTPATIENT)
Dept: FAMILY MEDICINE CLINIC | Facility: CLINIC | Age: 82
End: 2022-01-26

## 2022-01-26 NOTE — TELEPHONE ENCOUNTER
Courtney Mcdonald with [facility] ([phone]) called in regards to patient's request for refill of Sensors for [employer]  [facility] , one for 14 days that is   The second is for freestyle sanjay 2 (2 for 28 days) but script is not available  States they would need updated [facility] and that prior auth would also be needed

## 2022-01-27 ENCOUNTER — RA CDI HCC (OUTPATIENT)
Dept: OTHER | Facility: HOSPITAL | Age: 82
End: 2022-01-27

## 2022-01-27 DIAGNOSIS — I10 BENIGN ESSENTIAL HYPERTENSION: ICD-10-CM

## 2022-01-27 DIAGNOSIS — Z79.4 TYPE 2 DIABETES MELLITUS WITH DIABETIC POLYNEUROPATHY, WITH LONG-TERM CURRENT USE OF INSULIN (HCC): ICD-10-CM

## 2022-01-27 DIAGNOSIS — E11.42 TYPE 2 DIABETES MELLITUS WITH DIABETIC POLYNEUROPATHY, WITH LONG-TERM CURRENT USE OF INSULIN (HCC): ICD-10-CM

## 2022-01-27 RX ORDER — AMLODIPINE BESYLATE 5 MG/1
TABLET ORAL
Qty: 90 TABLET | Refills: 0 | Status: SHIPPED | OUTPATIENT
Start: 2022-01-27 | End: 2022-04-20

## 2022-01-27 RX ORDER — METFORMIN HYDROCHLORIDE 500 MG/1
TABLET, EXTENDED RELEASE ORAL
Qty: 180 TABLET | Refills: 1 | Status: SHIPPED | OUTPATIENT
Start: 2022-01-27 | End: 2022-06-09

## 2022-01-31 NOTE — TELEPHONE ENCOUNTER
[id number]     The pharmacy claim for FreeStyle Iker 14 Day [place] device has been rejected by insurance  Non-preferred medications may have a higher patient co-pay than the health insurance plan's preferred medications  Using available formulary data, we believe that the following medications may be covered   Drug Name [place] Requirement FreeStyle Iker 14 Day Sensor Required* FreeStyle Wei 2 Sensor Required* Dexcom G6  Required* Dexcom G6 Transmitter Required* Dexcom G6 Sensor Required* Dexcom G5  Kit Required* Dexcom G5 Mobile  Required* Eversense Sensor/Lee Required* Dexcom G5 Mobile Transmitter Required* Eversense Smart Transmitter Required* Dexcom G5 Mob/G4 Plat Sensor Required* Guardian Sensor 3 Required* Dexcom G4 Platinum Transmitter Required* Dexcom G4 Platinum

## 2022-01-31 NOTE — TELEPHONE ENCOUNTER
Patient ([phone]) called to check status of prior auth  Confirmed that we received message in regards to sensors  Request call to confirm when authorization is completed

## 2022-02-04 ENCOUNTER — TELEPHONE (OUTPATIENT)
Dept: FAMILY MEDICINE CLINIC | Facility: CLINIC | Age: 82
End: 2022-02-04

## 2022-02-04 DIAGNOSIS — Z79.4 TYPE 2 DIABETES MELLITUS WITH DIABETIC POLYNEUROPATHY, WITH LONG-TERM CURRENT USE OF INSULIN (HCC): Primary | ICD-10-CM

## 2022-02-04 DIAGNOSIS — E11.42 TYPE 2 DIABETES MELLITUS WITH DIABETIC POLYNEUROPATHY, WITH LONG-TERM CURRENT USE OF INSULIN (HCC): Primary | ICD-10-CM

## 2022-02-09 NOTE — TELEPHONE ENCOUNTER
Insurance called stating they have not received medical records to support this request  Per Nan Due please fax documentation to [phone] [facility]

## 2022-04-27 ENCOUNTER — TELEPHONE (OUTPATIENT)
Dept: FAMILY MEDICINE CLINIC | Facility: CLINIC | Age: 82
End: 2022-04-27

## 2022-04-27 NOTE — TELEPHONE ENCOUNTER
Patient ([phone]) called to request to be tested for Covid  Patient had symptoms of fever, chills and cough within the last five days  States all symptoms have now improved accept cough  Declined to schedule appointment and only wishes to be tested at this time

## 2022-04-29 ENCOUNTER — CLINICAL SUPPORT (OUTPATIENT)
Dept: FAMILY MEDICINE CLINIC | Facility: CLINIC | Age: 82
End: 2022-04-29

## 2022-04-29 DIAGNOSIS — B34.9 VIRAL INFECTION, UNSPECIFIED: Primary | ICD-10-CM

## 2022-04-29 LAB
FLUAV RNA RESP QL NAA+PROBE: NEGATIVE
FLUBV RNA RESP QL NAA+PROBE: NEGATIVE
SARS-COV-2 RNA RESP QL NAA+PROBE: POSITIVE

## 2022-04-29 PROCEDURE — 87636 SARSCOV2 & INF A&B AMP PRB: CPT | Performed by: FAMILY MEDICINE

## 2022-05-03 ENCOUNTER — TELEMEDICINE (OUTPATIENT)
Dept: FAMILY MEDICINE CLINIC | Facility: CLINIC | Age: 82
End: 2022-05-03
Payer: COMMERCIAL

## 2022-05-03 DIAGNOSIS — U07.1 COVID-19: Primary | ICD-10-CM

## 2022-05-03 PROCEDURE — 99442 PR PHYS/QHP TELEPHONE EVALUATION 11-20 MIN: CPT | Performed by: FAMILY MEDICINE

## 2022-05-03 RX ORDER — AMMONIUM LACTATE 12 G/100G
LOTION TOPICAL
COMMUNITY
Start: 2022-03-29

## 2022-05-03 NOTE — PROGRESS NOTES
COVID-19 Outpatient Progress Note    Chief Complaint   Patient presents with    COVID-19     Positive 04/29/22  Health Maintenance   Topic Date Due    SLP PLAN OF CARE  Never done    DTaP,Tdap,and Td Vaccines (1 - Tdap) Never done    Colorectal Cancer Screening  09/08/2020    URINE MICROALBUMIN  09/26/2020    COVID-19 Vaccine (3 - Booster for Moderna series) 07/20/2021    Medicare Annual Wellness Visit (AWV)  02/15/2022    BMI: Followup Plan  02/15/2022    Diabetic Foot Exam  07/02/2022    Fall Risk  08/02/2022    Depression Screening  08/02/2022    HEMOGLOBIN A1C  08/09/2022    Influenza Vaccine (Season Ended) 09/01/2022    BMI: Adult  11/11/2022    DM Eye Exam  04/30/2023    Pneumococcal Vaccine: 65+ Years  Completed    HIB Vaccine  Aged Out    Hepatitis B Vaccine  Aged Out    IPV Vaccine  Aged Out    Hepatitis A Vaccine  Aged Out    Meningococcal ACWY Vaccine  Aged Out    HPV Vaccine  Aged Out          Assessment/Plan:    Problem List Items Addressed This Visit        Other    COVID-19 - Primary     Symptoms started on 4/26/2022  He got Covid19 shots and 1st booster  Feels fine now  No more isolation  Disposition:     Patient has COVID-19 infection  Based off CDC guidelines, they were recommended to isolate for 5 days from the date of the positive test  If they remain asymptomatic, isolation may be ended followed by 5 days of wearing a mask when around othes to minimize risk of infecting others  If they have a fever, continue to stay home until fever resolves for at least 24 hours  I have spent 15 minutes directly with the patient  Greater than 50% of this time was spent in counseling/coordination of care regarding: diagnostic results, prognosis, risks and benefits of treatment options, instructions for management, patient and family education, risk factor reductions and impressions        Encounter provider Heaven Chakraborty MD    Provider located at [address] [place] [address] 1 [facility] [address]    Recent Visits  Date Type Provider Dept   04/27/22 Telephone Manny Vitale [facility]   Showing recent visits within past 7 days and meeting all other requirements  Today's Visits  Date Type Provider Dept   05/03/22 [address] [facility] [address] today's visits and meeting all other requirements  Future Appointments  No visits were found meeting these conditions  Showing future appointments within next 150 days and meeting all other requirements     This virtual check-in was done via telephone and he agrees to proceed  Patient agrees to participate in a virtual check in via telephone or video visit instead of presenting to the office to address urgent/immediate medical needs  Patient is aware this is a billable service  After connecting through Telephone, the patient was identified by name and date of birth  Jon Moralez was informed that this was a telemedicine visit and that the exam was being conducted confidentially over secure lines  My office door was closed  No one else was in the room  Jon Moralez acknowledged consent and understanding of privacy and security of the telemedicine visit  I informed the patient that I have reviewed his record in Epic and presented the opportunity for him to ask any questions regarding the visit today  The patient agreed to participate  It was my intent to perform this visit via video technology but the patient was not able to do a video connection so the visit was completed via audio telephone only  Verification of patient location:  Patient is located in the following state in which I hold an active license: [place]    Subjective:   Jon Moralez is a 80 y o  male who has been screened for COVID-19  Symptom change since last report: resolving  Patient's symptoms include chills, sore throat and cough   Patient denies fever, fatigue, malaise, congestion, rhinorrhea, anosmia, loss of taste, shortness of breath, chest tightness, abdominal pain, nausea, vomiting, diarrhea, myalgias and headaches  - Date of symptom onset: 4/26/2022  - Date of positive COVID-19 test: 4/29/2022  Type of test: PCR  COVID-19 vaccination status: Fully vaccinated with booster    Renzo Hernández has been staying home and has isolated themselves in his home  He is taking care to not share personal items and is cleaning all surfaces that are touched often, like counters, tabletops, and doorknobs using household cleaning sprays or wipes  He is wearing a mask when he leaves his room  Lab Results   Component Value Date    SARSCOV2 Positive (A) 04/29/2022     Past Medical History:   Diagnosis Date    Arthritis     Carotid stenosis     Coronary artery disease     Last Assessed:  11/5/13    CVA (cerebral vascular accident) [facility])     Last Assessed:  1/10/18    [facility]     Controlled with kidney complication, Last Assessed:  1/10/18    Hematuria     Hyperlipidemia     Hypertension     Kidney stone     Renal disorder     Trigger finger     Vitamin D deficiency      Past Surgical History:   Procedure Laterality Date    CARDIAC SURGERY      CIRCUMCISION      Resolved:  1964, Elective    COLONOSCOPY      Resolved:  2006    CORONARY ARTERY BYPASS GRAFT  2000    6 vesels    CYSTOSCOPY      Resolved:  4/9/10, Diagnostic    CYSTOSCOPY  12/14/2018    Dr Jimi Gonzales [place] INJECTION LEFT SHOULDER (ARTHROGRAM)  7/31/2019    FRACTURE SURGERY      left forearm fx with plate repair      HUMERUS FRACTURE SURGERY      Open, Greater tuberosity    LITHOTRIPSY  09/12/2013    Renal    NEPHROSTOMY Bilateral     With drainage    VASECTOMY       Current Outpatient Medications   Medication Sig Dispense Refill    amLODIPine (NORVASC) 5 mg tablet TAKE 1 TABLET BY MOUTH EVERY DAY 90 tablet 0    ammonium lactate (LAC-HYDRIN) 12 % lotion APPLY TO AFFECTED AREA OF SKIN UP TO TWICE DAILY AS NEEDED WITH FLARES      atorvastatin (LIPITOR) 40 mg tablet Take 1 tablet (40 mg total) by mouth daily 30 tablet 0    Continuous Blood Gluc  (FreeStyle Iker 2 [place]) ALEJANDRINA Use as instructed  1 each 1    Continuous Blood Gluc Sensor (FreeStyle Iker 2 Sensor) MISC USE AS INSTRUCTED 2 each 0    cyanocobalamin 1000 MCG tablet Take 1 tablet by mouth daily 30 tablet 3    insulin aspart (NOVOLOG FLEXPEN) 100 Units/mL injection pen Novolog Flexpen U-100 Insulin   2-8 units daily      metFORMIN (GLUCOPHAGE-XR) 500 mg 24 hr tablet TAKE 1 TABLET BY MOUTH TWICE A DAY WITH MEALS 180 tablet 1    methocarbamol (ROBAXIN) 750 mg tablet Take 1 tablet (750 mg total) by mouth every 6 (six) hours as needed for muscle spasms 30 tablet 0    Misc  Devices (ROLLATOR ULTRA-LIGHT) MISC by Does not apply route daily 1 each 0    NOVOLIN N 100 UNIT/ML subcutaneous injection 12 units in the morning and 22 units in the evening (Patient taking differently: 30 units in the morning and 30 units in the evening) 10 mL 0    warfarin (COUMADIN) 5 mg tablet Take 5 mg by mouth daily        No current facility-administered medications for this visit  Allergies   Allergen Reactions    Metformin GI Intolerance    Haemophilus Influenzae Rash    Influenza Virus Vaccine Rash       Review of Systems   Constitutional: Positive for chills  Negative for appetite change, fatigue and fever  HENT: Positive for sore throat  Negative for congestion, ear pain, rhinorrhea and sinus pain  Eyes: Negative for discharge and itching  Respiratory: Positive for cough  Negative for apnea, chest tightness, shortness of breath and wheezing  Cardiovascular: Negative for chest pain, palpitations and leg swelling  Gastrointestinal: Negative for abdominal pain, anal bleeding, constipation, diarrhea, nausea and vomiting  Endocrine: Negative for cold intolerance, heat intolerance and polyuria  Genitourinary: Negative for difficulty urinating and dysuria  Musculoskeletal: Negative for arthralgias, back pain and myalgias  Skin: Negative for rash  Neurological: Negative for dizziness and headaches  Psychiatric/Behavioral: Negative for agitation  Objective: There were no vitals filed for this visit  Physical Exam    VIRTUAL VISIT DISCLAIMER    Jg Shankar verbally agrees to participate in [employer]  Pt is aware that [employer] could be limited without vital signs or the ability to perform a full hands-on physical exam  Cirilo Bustamante understands he or the provider may request at any time to terminate the video visit and request the patient to seek care or treatment in person

## 2022-05-03 NOTE — ASSESSMENT & PLAN NOTE
Symptoms started on 4/26/2022  He got Covid19 shots and 1st booster  Feels fine now  No more isolation

## 2022-05-18 DIAGNOSIS — Z79.4 TYPE 2 DIABETES MELLITUS WITH DIABETIC POLYNEUROPATHY, WITH LONG-TERM CURRENT USE OF INSULIN (HCC): ICD-10-CM

## 2022-05-18 DIAGNOSIS — E78.2 MIXED HYPERLIPIDEMIA: ICD-10-CM

## 2022-05-18 DIAGNOSIS — E11.42 TYPE 2 DIABETES MELLITUS WITH DIABETIC POLYNEUROPATHY, WITH LONG-TERM CURRENT USE OF INSULIN (HCC): ICD-10-CM

## 2022-05-18 RX ORDER — FLASH GLUCOSE SENSOR
KIT MISCELLANEOUS
Qty: 2 EACH | Refills: 0 | Status: SHIPPED | OUTPATIENT
Start: 2022-05-18 | End: 2022-06-06

## 2022-05-19 RX ORDER — ATORVASTATIN CALCIUM 40 MG/1
TABLET, FILM COATED ORAL
Qty: 90 TABLET | Refills: 1 | Status: SHIPPED | OUTPATIENT
Start: 2022-05-19

## 2022-05-27 ENCOUNTER — TELEPHONE (OUTPATIENT)
Dept: FAMILY MEDICINE CLINIC | Facility: CLINIC | Age: 82
End: 2022-05-27

## 2022-05-27 DIAGNOSIS — Z00.00 HEALTHCARE MAINTENANCE: Primary | ICD-10-CM

## 2022-05-27 NOTE — TELEPHONE ENCOUNTER
Patient is requesting a script be faxed to [facility] (fax: [phone]) for a high rise toilet due to the difficulty he faces getting off the toilet  Patient can be contacted at [phone] with any questions

## 2022-05-31 NOTE — TELEPHONE ENCOUNTER
Patient calling today and stating wrong toilet was ordered  He needs order to state a handicapped height toilet       Fax to Ke at [facility]    [phone]

## 2022-06-09 DIAGNOSIS — Z79.4 TYPE 2 DIABETES MELLITUS WITH DIABETIC POLYNEUROPATHY, WITH LONG-TERM CURRENT USE OF INSULIN (HCC): ICD-10-CM

## 2022-06-09 DIAGNOSIS — E11.42 TYPE 2 DIABETES MELLITUS WITH DIABETIC POLYNEUROPATHY, WITH LONG-TERM CURRENT USE OF INSULIN (HCC): ICD-10-CM

## 2022-06-09 RX ORDER — METFORMIN HYDROCHLORIDE 500 MG/1
TABLET, EXTENDED RELEASE ORAL
Qty: 180 TABLET | Refills: 1 | Status: SHIPPED | OUTPATIENT
Start: 2022-06-09

## 2022-06-09 NOTE — PROGRESS NOTES
"  Discussion/Summary  Normal device function     No P wave in routine monitoring    Episodes labelled as A fib have a baseline CL and some variation  If patient working out / walking can be sinus with PACs    need more monitoring to confirm a fib  Results/Data  Cardiac Device In Clinic 13Apr2017 12:18PM Huan Bowles     Test Name Result Flag Reference   MISCELLANEOUS COMMENT (Report)     DEVICE INTERROGATED IN THE [facility] OFFICE  BATTERY STATUS "GOOD"  DEVICE DETECTED 3 AF EPISODES ON 4/2/17 LONGEST 8 MINS  PT ON ASA 325MG  AT/AF BURDEN<0 1%  1 PT ACTIVATED EPISODE DONE FOR DEMONSTRATION  PRESENTING RHYTHM NSR W/ PVC  NORMAL DEVICE FUNCTION  WOUND CHECK: INCISION CLEAN AND DRY WITH EDGES APPROXIMATED; STERISTIPS REMOVED; WOUND CARE AND RESTRICTIONS REVIEWED WITH PATIENT  PT FOLLOWS W/ DR Evaristo Ramirez  CALLED DR Paige Amador OFFICE TO MAKE AWARE OF AF  DR Evaristo Ramirez WILL MONITOR ILR  GV   Cardiac Electrophysiology Report      [id number]  pdf   DEVICE TYPE Monitor       Cardiac Electrophysiology Report [id number] 12:18PM Huan Bowles     Test Name Result Flag Reference   Cardiac Electrophysiology Report      [id number]  pdf     Signatures   Electronically signed by : Bull Solano RN; Apr 13 2017 12:40PM EST                       (Author)    Electronically signed by : LYRIC Landrum ; Apr 21 2017  6:17AM EST                       (Author)    " Glycopyrrolate Pregnancy And Lactation Text: This medication is Pregnancy Category B and is considered safe during pregnancy. It is unknown if it is excreted breast milk.

## 2022-06-20 ENCOUNTER — TELEPHONE (OUTPATIENT)
Dept: NEPHROLOGY | Facility: CLINIC | Age: 82
End: 2022-06-20

## 2022-06-20 NOTE — TELEPHONE ENCOUNTER
Left message to schedule follow up appointment with Evert Ahumada in [facility]  This is the first attempt

## 2022-06-23 NOTE — TELEPHONE ENCOUNTER
Pt called the [facility] to schedule his f/u appt with Dr Margaret Suarez  Scheduled for 10/18/22 at 9 am  Lab script and appointment reminder has been mailed to his address on file

## 2022-07-05 DIAGNOSIS — I10 BENIGN ESSENTIAL HYPERTENSION: ICD-10-CM

## 2022-07-05 RX ORDER — AMLODIPINE BESYLATE 5 MG/1
TABLET ORAL
Qty: 90 TABLET | Refills: 0 | Status: SHIPPED | OUTPATIENT
Start: 2022-07-05 | End: 2022-09-21 | Stop reason: SDUPTHER

## 2022-08-04 ENCOUNTER — OFFICE VISIT (OUTPATIENT)
Dept: FAMILY MEDICINE CLINIC | Facility: CLINIC | Age: 82
End: 2022-08-04
Payer: COMMERCIAL

## 2022-08-04 VITALS
WEIGHT: 186.8 LBS | DIASTOLIC BLOOD PRESSURE: 66 MMHG | RESPIRATION RATE: 20 BRPM | SYSTOLIC BLOOD PRESSURE: 136 MMHG | BODY MASS INDEX: 33.1 KG/M2 | HEIGHT: 63 IN | HEART RATE: 88 BPM | TEMPERATURE: 98.7 F | OXYGEN SATURATION: 96 %

## 2022-08-04 DIAGNOSIS — Z79.4 TYPE 2 DIABETES MELLITUS WITH DIABETIC POLYNEUROPATHY, WITH LONG-TERM CURRENT USE OF INSULIN (HCC): ICD-10-CM

## 2022-08-04 DIAGNOSIS — E11.42 TYPE 2 DIABETES MELLITUS WITH DIABETIC POLYNEUROPATHY, WITH LONG-TERM CURRENT USE OF INSULIN (HCC): ICD-10-CM

## 2022-08-04 DIAGNOSIS — R26.81 GAIT INSTABILITY: ICD-10-CM

## 2022-08-04 DIAGNOSIS — Z00.00 MEDICARE ANNUAL WELLNESS VISIT, SUBSEQUENT: ICD-10-CM

## 2022-08-04 DIAGNOSIS — I48.91 ATRIAL FIBRILLATION, UNSPECIFIED TYPE (HCC): ICD-10-CM

## 2022-08-04 DIAGNOSIS — I10 ESSENTIAL HYPERTENSION: Primary | ICD-10-CM

## 2022-08-04 DIAGNOSIS — E78.5 HYPERLIPIDEMIA, UNSPECIFIED HYPERLIPIDEMIA TYPE: ICD-10-CM

## 2022-08-04 PROCEDURE — 99214 OFFICE O/P EST MOD 30 MIN: CPT | Performed by: FAMILY MEDICINE

## 2022-08-04 PROCEDURE — G0439 PPPS, SUBSEQ VISIT: HCPCS | Performed by: FAMILY MEDICINE

## 2022-08-04 PROCEDURE — 3288F FALL RISK ASSESSMENT DOCD: CPT | Performed by: FAMILY MEDICINE

## 2022-08-04 PROCEDURE — 3725F SCREEN DEPRESSION PERFORMED: CPT | Performed by: FAMILY MEDICINE

## 2022-08-04 PROCEDURE — 1125F AMNT PAIN NOTED PAIN PRSNT: CPT | Performed by: FAMILY MEDICINE

## 2022-08-04 PROCEDURE — 1170F FXNL STATUS ASSESSED: CPT | Performed by: FAMILY MEDICINE

## 2022-08-04 PROCEDURE — 1101F PT FALLS ASSESS-DOCD LE1/YR: CPT | Performed by: FAMILY MEDICINE

## 2022-08-04 PROCEDURE — 1160F RVW MEDS BY RX/DR IN RCRD: CPT | Performed by: FAMILY MEDICINE

## 2022-08-04 NOTE — PROGRESS NOTES
Chief Complaint   Patient presents with   Fermin [facility] Wellness Visit     Subsequent   Follow-up     6 months  Health Maintenance   Topic Date Due    SLP PLAN OF CARE  Never done    Colorectal Cancer Screening  09/08/2020    URINE MICROALBUMIN  09/26/2020    COVID-19 Vaccine (3 - Booster for Moderna series) 07/20/2021    Medicare Annual Wellness Visit (AWV)  02/15/2022    BMI: Followup Plan  02/15/2022    Diabetic Foot Exam  07/02/2022    Influenza Vaccine (1) 09/01/2022    DM Eye Exam  04/30/2023    HEMOGLOBIN A1C  07/02/2023    Fall Risk  08/04/2023    Depression Screening  08/04/2023    BMI: Adult  08/04/2023    Pneumococcal Vaccine: 65+ Years  Completed    HIB Vaccine  Aged Out    Hepatitis B Vaccine  Aged Out    IPV Vaccine  Aged Out    Hepatitis A Vaccine  Aged Out    Meningococcal ACWY Vaccine  Aged Out    HPV Vaccine  Aged Out      Assessment and Plan:     Problem List Items Addressed This Visit        Endocrine    Type 2 diabetes mellitus with diabetic polyneuropathy, with long-term current use of insulin ([address] )       Lab Results   Component Value Date    HGBA1C 7 5 (H) 07/02/2022     Stable  FU endocrinology in [place]  Cardiovascular and Mediastinum    Essential hypertension - Primary     Controlled  DASH diet  Continue amlodipine 5mg QD  Atrial fibrillation ([address] )     Continue coumadin per cardiology  Other    Hyperlipidemia     Low fat diet  Continue atorvastatin 40mg qhs  Gait instability      Other Visit Diagnoses     Medicare annual wellness visit, subsequent            BMI Counseling: Body mass index is 33 09 kg/m²  Follow-up plan was not completed due to elderly patient (72 years old) where weight reduction/weight gain would complicate underlying health condition such as: illness or physical disability  Depression Screening and Follow-up Plan: Patient was screened for depression during today's encounter   They screened negative with a PHQ-2 score of 0  Refused flu shot because he is allergic to it    Got PCV 13 in 2015 and pneumovax in 2016    Got Covid19 vaccines and booster  Fall precautions    RTO in 1 year  POA---daughter  Living will----DNR if "brain dead" per pt  Preventive health issues were discussed with patient, and age appropriate screening tests were ordered as noted in patient's After Visit Summary  Personalized health advice and appropriate referrals for health education or preventive services given if needed, as noted in patient's After Visit Summary  History of Present Illness:     Patient presents for a Medicare Wellness Visit    HPI     Pt is here by himself        DM---7/2022 hgA1C 7 5 stable  [facility] [place]  Pt states his endocrinology check labs regularly     He is on meformin 500mg bid  Denies side effects  He is novolin 30u bid    Blood sugar at home 100-150 per pt  He got FreeStyle sanjay  Had foot neuropathy  Use walker  FU [facility]mology [facility] Jaya Santoro on [facility]  [facility]  No problem per pt       HTN----He is on amlodipine 5mg QD  BP at home 110-120/70-80 per pt    Hx of CVA in 3/2017  Right leg feels weakness  Use walker which helped     Afib---He is on coumadin  Cardiology follows PT/INR monthly  S/p packemaker 11/2017  [facility] cardiology from [facility] regularly  7/2022 Echo showed EF 60-65%        Hyperlipidemia---Pt is on atorvastatin 40mg qhs  Denies side effects  7/2022 Lipid 142/178/35/71  DWIGHT---He does not use CPAP per pt       Lives by himself  Walks with walker/cane  Does all ADL's  Still drive  Denies recent falls  Denies depression        Patient Care Team:  Damon Calvo MD as PCP - MD Damon Cueva MD Launie Enter, MD     Review of Systems:     Review of Systems   Constitutional: Negative for appetite change, chills and fever  HENT: Negative for congestion, ear pain, sinus pain and sore throat  Eyes: Negative for discharge and itching  Respiratory: Negative for apnea, cough, chest tightness, shortness of breath and wheezing  Cardiovascular: Negative for chest pain, palpitations and leg swelling  Gastrointestinal: Negative for abdominal pain, anal bleeding, constipation, diarrhea, nausea and vomiting  Endocrine: Negative for cold intolerance, heat intolerance and polyuria  Genitourinary: Negative for difficulty urinating and dysuria  Musculoskeletal: Negative for arthralgias, back pain and myalgias  Skin: Negative for rash  Neurological: Negative for dizziness and headaches  Psychiatric/Behavioral: Negative for agitation  Problem List:     Patient Active Problem List   Diagnosis    Weakness    Type 2 diabetes mellitus with diabetic polyneuropathy, with long-term current use of insulin ([address] )    Coronary artery disease    Hyperlipidemia    Bilateral [facility] artery disease [facility]    Cerebrovascular accident ([facility] )    Abdominal bruit    Actinic keratosis    Allergic rhinitis    Essential hypertension    Chronic kidney disease, stage 3 ([facility] )    CAD (coronary artery disease) of artery bypass graft    Erectile dysfunction of non-organic origin    Gait instability    Left low back pain    Myelomalacia ([address] )    Myofascial pain    Nephrolithiasis    Obstructive sleep apnea    Osteoarthritis    Other specified forms of chronic ischemic heart disease    Atrial fibrillation ([address] )    Peripheral vascular disease ([address] )    Pulmonary nodule seen on imaging study    S/P cardiac pacemaker procedure    Tachy-brice syndrome ([address] )    History of renal calculi    Subacromial bursitis of left shoulder joint    Rotator cuff tendinitis, left    Obesity (BMI 30-39  9)    Microalbuminuria    Memory loss    Sensorineural hearing loss (SNHL) of both ears    Microscopic hematuria    COVID-19      Past Medical and Surgical History:     Past Medical History:   Diagnosis Date    Arthritis     Carotid stenosis     Coronary artery disease     Last Assessed:  11/5/13    CVA [facility])     Last Assessed:  1/10/18    Diabetes mellitus ([address] )     Controlled with kidney complication, Last Assessed:  1/10/18    Hematuria     Hyperlipidemia     Hypertension     Kidney stone     Renal disorder     Trigger finger     Vitamin D deficiency      Past Surgical History:   Procedure Laterality Date    CARDIAC SURGERY      CIRCUMCISION      Resolved:  1964, Elective    COLONOSCOPY      Resolved:  2006    CORONARY ARTERY BYPASS GRAFT  2000 6 vesels    CYSTOSCOPY      Resolved:  4/9/10, Diagnostic    CYSTOSCOPY  12/14/2018    Dr Jose Alberto Tomlinson [place] INJECTION LEFT SHOULDER (ARTHROGRAM)  7/31/2019    FRACTURE SURGERY      left forearm fx with plate repair   HUMERUS FRACTURE SURGERY      Open, Greater tuberosity    LITHOTRIPSY  09/12/2013    Renal    NEPHROSTOMY Bilateral     With drainage    VASECTOMY        Family History:     Family History   Problem Relation Age of Onset    Heart attack Mother     Coronary artery disease Mother     Emphysema Father     Cancer Brother         Leukemia    Hypertension Son     Diabetes Maternal Grandfather     Hypertension Other     Other Other         Cerebrovascular accident (CVA)      Social History:     Social History     Socioeconomic History    Marital status:      Spouse name: None    Number of children: None    Years of education: None    Highest education level: None   Occupational History    None   Tobacco Use    Smoking status: Former Smoker     Packs/day: 1 50     Years: 40 00     Pack years: 60 00     Types: Cigarettes    Smokeless tobacco: Never Used    Tobacco comment: quit 30 years ago     Substance and Sexual Activity    Alcohol use: No    Drug use: No    Sexual activity: None   Other Topics Concern    None   Social History Narrative    Daily caffeine consumption, 1 serving a day     Social Determinants of Health     Financial Resource Strain: Not on file   Food Insecurity: Not on file   Transportation Needs: Not on file   Physical Activity: Not on file   Stress: Not on file   Social Connections: Not on file   Intimate Partner Violence: Not on file   Housing Stability: Not on file      Medications and Allergies:     Current Outpatient Medications   Medication Sig Dispense Refill    amLODIPine (NORVASC) 5 mg tablet TAKE 1 TABLET BY MOUTH EVERY DAY 90 tablet 0    ammonium lactate (LAC-HYDRIN) 12 % lotion APPLY TO AFFECTED AREA OF SKIN UP TO TWICE DAILY AS NEEDED WITH FLARES      atorvastatin (LIPITOR) 40 mg tablet TAKE 1 TABLET BY MOUTH EVERY DAY 90 tablet 1    Continuous Blood Gluc  (FreeStyle Iker 2 [place]) ALEJANDRINA Use as instructed  1 each 1    Continuous Blood Gluc Sensor (FreeStyle Iker 2 Sensor) MISC USE AS INSTRUCTED 2 each 5    cyanocobalamin 1000 MCG tablet Take 1 tablet by mouth daily 30 tablet 3    insulin aspart (NOVOLOG FLEXPEN) 100 Units/mL injection pen Novolog Flexpen U-100 Insulin   2-8 units daily      metFORMIN (GLUCOPHAGE-XR) 500 mg 24 hr tablet TAKE 1 TABLET BY MOUTH TWICE A DAY WITH MEALS 180 tablet 1    Misc  Devices (ROLLATOR ULTRA-LIGHT) MISC by Does not apply route daily 1 each 0    NOVOLIN N 100 UNIT/ML subcutaneous injection 12 units in the morning and 22 units in the evening (Patient taking differently: 30 units in the morning and 30 units in the evening) 10 mL 0    warfarin (COUMADIN) 5 mg tablet Take 5 mg by mouth daily        No current facility-administered medications for this visit       Allergies   Allergen Reactions    Metformin GI Intolerance    Haemophilus Influenzae Rash    Influenza Virus Vaccine Rash      Immunizations:     Immunization History   Administered Date(s) Administered    COVID-19 MODERNA VACC 0 5 ML IM 01/22/2021, 02/20/2021    INFLUENZA 01/01/2002, 04/08/2004, 01/01/2006, 11/01/2006    Pneumococcal 01/01/2000, 11/10/2006    Pneumococcal Conjugate 13-Valent 05/05/2015    Pneumococcal Polysaccharide PPV23 11/09/2006, 05/26/2016      Health Maintenance:         Topic Date Due    Colorectal Cancer Screening  09/08/2020         Topic Date Due    COVID-19 Vaccine (3 - Booster for Moderna series) 07/20/2021    Influenza Vaccine (1) 09/01/2022      Medicare Screening Tests and Risk Assessments:     Beata Caba is here for his Subsequent Wellness visit  Health Risk Assessment:   Patient rates overall health as very good  Patient feels that their physical health rating is same  Patient is very satisfied with their life  Eyesight was rated as same  Hearing was rated as same  Patient feels that their emotional and mental health rating is same  Patients states they are never, rarely angry  Patient states they are never, rarely unusually tired/fatigued  Pain experienced in the last 7 days has been none  Patient states that he has experienced no weight loss or gain in last 6 months  Fall Risk Screening: In the past year, patient has experienced: no history of falling in past year      Home Safety:  Patient does not have trouble with stairs inside or outside of their home  Patient has working smoke alarms and has working carbon monoxide detector  Home safety hazards include: none  Nutrition:   Current diet is Regular and Limited junk food  Medications:   Patient is currently taking over-the-counter supplements  OTC medications include: see medication list  Patient is able to manage medications  Activities of Daily Living (ADLs)/Instrumental Activities of Daily Living (IADLs):   Walk and transfer into and out of bed and chair?: Yes  Dress and groom yourself?: Yes    Bathe or shower yourself?: Yes    Feed yourself?  Yes  Do your laundry/housekeeping?: Yes  Manage your money, pay your bills and track your expenses?: Yes  Make your own meals?: Yes    Do your own shopping?: Yes    Previous Hospitalizations:   Any hospitalizations or ED visits within the last 12 months?: No      PREVENTIVE SCREENINGS      Cardiovascular Screening:    General: Screening Not Indicated and History Lipid Disorder      Diabetes Screening:     General: Screening Not Indicated and History Diabetes      Prostate Cancer Screening:    General: Screening Not Indicated      Abdominal Aortic Aneurysm (AAA) Screening:    Risk factors include: tobacco use        Lung Cancer Screening:     General: Screening Not Indicated    Screening, Brief Intervention, and Referral to Treatment (SBIRT)    Screening  Typical number of drinks in a day: 0  Typical number of drinks in a week: 0  Interpretation: Low risk drinking behavior  Single Item Drug Screening:  How often have you used an illegal drug (including marijuana) or a prescription medication for non-medical reasons in the past year? never    Single Item Drug Screen Score: 0  Interpretation: Negative screen for possible drug use disorder    No exam data present     Physical Exam:     /66 (BP Location: Left arm, Patient Position: Sitting, Cuff Size: Adult)   Pulse 88   Temp 98 7 °F (37 1 °C) (Tympanic)   Resp 20   Ht 5' 3" (1 6 m)   Wt 84 7 kg (186 lb 12 8 oz)   SpO2 96%   BMI 33 09 kg/m²     Physical Exam  Vitals reviewed  Constitutional:       Appearance: Normal appearance  HENT:      Head: Normocephalic and atraumatic  Eyes:      General:         Right eye: No discharge  Left eye: No discharge  Conjunctiva/sclera: Conjunctivae normal    Neck:      Vascular: No carotid bruit  Cardiovascular:      Rate and Rhythm: Normal rate and regular rhythm  Heart sounds: Normal heart sounds  No murmur heard  No friction rub  No gallop  Pulmonary:      Effort: Pulmonary effort is normal  No respiratory distress  Breath sounds: Normal breath sounds  No wheezing or rales  Abdominal:      General: Bowel sounds are normal  There is no distension  Palpations: Abdomen is soft  Tenderness: There is no abdominal tenderness  Musculoskeletal:         General: No swelling, tenderness or deformity  Cervical back: Normal range of motion and neck supple  No muscular tenderness  Comments: Use walker   Lymphadenopathy:      Cervical: No cervical adenopathy  Neurological:      Mental Status: He is alert     Psychiatric:         Mood and Affect: Mood normal           Cruz Rojas MD

## 2022-08-04 NOTE — PATIENT INSTRUCTIONS

## 2022-08-04 NOTE — ASSESSMENT & PLAN NOTE
Lab Results   Component Value Date    HGBA1C 7 5 (H) 07/02/2022     Stable   endocrinology in [place]

## 2022-09-01 LAB
CREAT ?TM UR-SCNC: 62.8 UMOL/L
CREAT ?TM UR-SCNC: 62.8 UMOL/L
ERYTHROCYTE [DISTWIDTH] IN BLOOD BY AUTOMATED COUNT: 15 % (ref 11.6–15.1)
EXT GLUCOSE BLD: 141
EXT PROTEIN URINE: 124.7
EXT PROTEIN URINE: 124.7
EXTERNAL ALBUMIN: 3.9
EXTERNAL ANION GAP: 8
EXTERNAL BUN: 23
EXTERNAL CALCIUM: 10.1
EXTERNAL CHLORIDE: 105
EXTERNAL CO2: 28
EXTERNAL CREATININE: 1.47
EXTERNAL EGFR: 48
EXTERNAL PHOSPHORUS: 2.7
EXTERNAL POTASSIUM: 4
EXTERNAL PTH: 35.9
EXTERNAL SODIUM: 140
HCT VFR BLD AUTO: 46 % (ref 36.5–49.3)
HGB BLD-MCNC: 15.7 G/DL (ref 12–17)
HLA-B64(14) DONR QL: 81 FL (ref 92–115)
MCH RBC BLDCO QN: 27.5 PG (ref 27–34)
MCHC. (HISTORICAL): 34.1
PLATELET # BLD AUTO: 212 THOUSANDS/UL (ref 149–390)
PMV BLD AUTO: 8.8 FL (ref 8.9–12.7)
PROT/CREAT UR: 1.99 MG/G{CREAT}
PROT/CREAT UR: 1.99 MG/G{CREAT}
RBC # BLD AUTO: 5.7 MILLION/UL
WBC # BLD AUTO: 8.7 THOUSAND/UL

## 2022-09-21 DIAGNOSIS — I10 BENIGN ESSENTIAL HYPERTENSION: ICD-10-CM

## 2022-09-21 RX ORDER — AMLODIPINE BESYLATE 5 MG/1
5 TABLET ORAL DAILY
Qty: 90 TABLET | Refills: 0 | Status: SHIPPED | OUTPATIENT
Start: 2022-09-21

## 2022-09-30 ENCOUNTER — VBI (OUTPATIENT)
Dept: ADMINISTRATIVE | Facility: OTHER | Age: 82
End: 2022-09-30

## 2022-10-17 ENCOUNTER — TELEPHONE (OUTPATIENT)
Dept: NEPHROLOGY | Facility: CLINIC | Age: 82
End: 2022-10-17

## 2022-10-17 NOTE — TELEPHONE ENCOUNTER
Appointment Confirmation   Person confirmed appointment with  If not patient, name of the person Patient     Date and time of appointment 10/18   Patient acknowledged and will be at appointment?  yes   Did you advise the patient that they will need a urine sample if they are a new patient? no   Did you advise the patient to bring their current medications for verification? (including any OTC) no   Additional Information

## 2022-10-18 ENCOUNTER — OFFICE VISIT (OUTPATIENT)
Dept: NEPHROLOGY | Facility: CLINIC | Age: 82
End: 2022-10-18
Payer: COMMERCIAL

## 2022-10-18 VITALS
BODY MASS INDEX: 34.41 KG/M2 | WEIGHT: 194.2 LBS | HEIGHT: 63 IN | OXYGEN SATURATION: 98 % | HEART RATE: 77 BPM | SYSTOLIC BLOOD PRESSURE: 130 MMHG | DIASTOLIC BLOOD PRESSURE: 70 MMHG

## 2022-10-18 DIAGNOSIS — N18.31 STAGE 3A CHRONIC KIDNEY DISEASE (HCC): Primary | ICD-10-CM

## 2022-10-18 DIAGNOSIS — E66.9 OBESITY (BMI 30-39.9): ICD-10-CM

## 2022-10-18 DIAGNOSIS — E78.5 HYPERLIPIDEMIA, UNSPECIFIED HYPERLIPIDEMIA TYPE: ICD-10-CM

## 2022-10-18 DIAGNOSIS — I10 ESSENTIAL HYPERTENSION: ICD-10-CM

## 2022-10-18 DIAGNOSIS — I77.9 BILATERAL CAROTID ARTERY DISEASE, UNSPECIFIED TYPE (HCC): ICD-10-CM

## 2022-10-18 DIAGNOSIS — R80.9 MICROALBUMINURIA: ICD-10-CM

## 2022-10-18 DIAGNOSIS — Z79.4 TYPE 2 DIABETES MELLITUS WITH DIABETIC POLYNEUROPATHY, WITH LONG-TERM CURRENT USE OF INSULIN (HCC): ICD-10-CM

## 2022-10-18 DIAGNOSIS — E11.42 TYPE 2 DIABETES MELLITUS WITH DIABETIC POLYNEUROPATHY, WITH LONG-TERM CURRENT USE OF INSULIN (HCC): ICD-10-CM

## 2022-10-18 PROCEDURE — 99214 OFFICE O/P EST MOD 30 MIN: CPT | Performed by: INTERNAL MEDICINE

## 2022-10-18 NOTE — PROGRESS NOTES
OFFICE FOLLOW UP - Nephrology   Kylie Bustamante 80 y o  male MRN: [id number]       ASSESSMENT and PLAN:  Yoni Davila was seen today for follow-up, chronic kidney disease and hypertension  Diagnoses and all orders for this visit:    Stage 3a chronic kidney disease ([facility] )  -     CBC; Future  -     Renal function panel; Future  -     PTH, intact; Future  -     Protein / creatinine ratio, urine; Future    Essential hypertension    Obesity (BMI 30-39  9)    Microalbuminuria    Hyperlipidemia, unspecified hyperlipidemia type    Type 2 diabetes mellitus with diabetic polyneuropathy, with long-term current use of insulin ([facility])    Bilateral carotid artery disease, unspecified type ([facility] )        This is a 80-year-old gentleman with known history of chronic kidney disease stage 3 in the setting of diabetes, hypertension, previous episode of obstructive uropathy in 2013, hyperlipidemia who returns to the office for follow-up  1  Chronic kidney disease stage 3 baseline creatinine around 1 2-1 5  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, hypertensive nephrosclerosis and possible atherosclerotic disease as well as age-related nephron loss  Kidney function fairly stable with a most recent creatinine of 1 47 and an estimated GFR of 48, noted worsening proteinuria  Once again discussed with patient about importance of have good blood sugar control, avoid NSAIDs, have a good blood pressure control, follow healthy diet low-sodium low carb  Would like to see him back in the office in 1 year with repeat blood and urine test      2  Insulin-dependent diabetes, continue management by his family doctor  Goal to have an A1c around 7-8% given age  3  Hypertension, blood pressure acceptable, no changes on his medications at this moment  4  History of nephrolithiasis with episode of obstructive uropathy requiring bilateral percutaneous nephrostomy tubes in 2013      He has not passed any kidney stones in several years, continue to to follow a low-salt diet and drink at least 2 L of water daily  5  Mineral bone disease, most recent phosphorus and PTH within normal limits, follow labs in 1 year  6  Hemoglobin normal, most recent 15 7, follow CBC in 1 year    7  Tachy-brice syndrome status post pacemaker placement, continue follow-up with cardiologist at [facility]     8  Hyperlipidemia, continue with statins      Patient Instructions   As we discussed in the office visit, based on your most recent blood test your kidney function remains stable for several years  Remember to stay well-hydrated  Remember to follow low-salt diet  Remember to good diabetes control  Avoid NSAIDs (no ibuprofen, Motrin, Advil, Aleve, naproxen)  Okay to take Tylenol or paracetamol or acetaminophen as needed for pain  I would like to see you back in the office in 1 year with repeat labs  Continue close follow-up primary care doctor and your cardiologist        HPI: Janelle Macdonald is a 80 y o  male who is here for Follow-up, Chronic Kidney Disease, and Hypertension    Last time seen was in October 2021, today he returns for year follow-up  Since our last visit, when today [facility] ER on November2021 due to laceration of the right thumb while opening a can of cat food  Currently feeling okay, without any complaints other than hearing problems  Denies any chest pain, no shortness of breath, no leg edema  Denies any abdominal pain, no nausea, no vomiting, no recent diarrhea or constipation  Denies any urinary problems, no burning sensation, no gross hematuria, has not passed any kidney stone years  Appetite is stable  Denies taking any NSAIDs  Denies tobacco use  Most recent blood work was done on 09/01/2022, which we have reviewed together   Cr 1 47, eGFR 48, Hgb 15 7, normal electrolytes  PTH 35, UPC 1 9        ROS: All the systems were reviewed and were negative except as documented on the H&P         Allergies: Metformin, Haemophilus influenzae, and Influenza virus vaccine    Medications:   Current Outpatient Medications:   •  amLODIPine (NORVASC) 5 mg tablet, Take 1 tablet (5 mg total) by mouth daily, Disp: 90 tablet, Rfl: 0  •  ammonium lactate (LAC-HYDRIN) 12 % lotion, APPLY TO AFFECTED AREA OF SKIN UP TO TWICE DAILY AS NEEDED WITH FLARES, Disp: , Rfl:   •  atorvastatin (LIPITOR) 40 mg tablet, TAKE 1 TABLET BY MOUTH EVERY DAY, Disp: 90 tablet, Rfl: 1  •  Continuous Blood Gluc  (FreeStyle Iker 2 [place]) ALEJANDRINA, Use as instructed , Disp: 1 each, Rfl: 1  •  Continuous Blood Gluc Sensor (FreeStyle Iker 2 Sensor) MISC, USE AS INSTRUCTED, Disp: 2 each, Rfl: 5  •  cyanocobalamin 1000 MCG tablet, Take 1 tablet by mouth daily, Disp: 30 tablet, Rfl: 3  •  insulin aspart (NovoLOG) 100 Units/mL injection pen, Novolog Flexpen U-100 Insulin  2-8 units daily, Disp: , Rfl:   •  metFORMIN (GLUCOPHAGE-XR) 500 mg 24 hr tablet, TAKE 1 TABLET BY MOUTH TWICE A DAY WITH MEALS, Disp: 180 tablet, Rfl: 1  •  Misc   Devices (ROLLATOR ULTRA-LIGHT) MISC, by Does not apply route daily, Disp: 1 each, Rfl: 0  •  NOVOLIN N 100 UNIT/ML subcutaneous injection, 12 units in the morning and 22 units in the evening (Patient taking differently: 30 units in the morning and 30 units in the evening), Disp: 10 mL, Rfl: 0  •  warfarin (COUMADIN) 5 mg tablet, Take 5 mg by mouth daily , Disp: , Rfl:     Past Medical History:   Diagnosis Date   • Arthritis    • Carotid stenosis    • Coronary artery disease     Last Assessed:  11/5/13   • CVA (cere[facility] vascular accident) [facility])     Last Assessed:  1/10/18   • Diabetes mellitus ([address] )     Controlled with kidney complication, Last Assessed:  1/10/18   • Hematuria    • Hyperlipidemia    • Hypertension    • Kidney stone    • Renal disorder    • Trigger finger    • Vitamin D deficiency      Past Surgical History:   Procedure Laterality Date   • CARDIAC SURGERY     • CIRCUMCISION      Resolved: 1964, Elective   • COLONOSCOPY      Resolved:  2006   • CORONARY ARTERY BYPASS GRAFT  2000    6 vesels   • CYSTOSCOPY      Resolved:  4/9/10, Diagnostic   • CYSTOSCOPY  12/14/2018    Dr Martinez Res   • ELBOW SURGERY     • [place] INJECTION LEFT SHOULDER (ARTHROGRAM)  7/31/2019   • FRACTURE SURGERY      left forearm fx with plate repair  • HUMERUS FRACTURE SURGERY      Open, Greater tuberosity   • LITHOTRIPSY  09/12/2013    Renal   • NEPHROSTOMY Bilateral     With drainage   • VASECTOMY       Family History   Problem Relation Age of Onset   • Heart attack Mother    • Coronary artery disease Mother    • Emphysema Father    • Cancer Brother         Leukemia   • Hypertension Son    • Diabetes Maternal Grandfather    • Hypertension Other    • Other Other         Cerebrovascular accident (CVA)      reports that he has quit smoking  His smoking use included cigarettes  He has a 60 00 pack-year smoking history  He has never used smokeless tobacco  He reports that he does not drink alcohol and does not use drugs  Physical Exam:   Vitals:    10/18/22 0846   BP: 130/70   BP Location: Left arm   Patient Position: Sitting   Cuff Size: Adult   Pulse: 77   SpO2: 98%   Weight: 88 1 kg (194 lb 3 2 oz)   Height: 5' 3" (1 6 m)     Body mass index is 34 4 kg/m²        General: conscious, cooperative, in not acute distress  Eyes: conjunctivae pink, anicteric sclerae  ENT: lips and mucous membranes moist  Neck: supple, no JVD  Chest: clear breath sounds bilateral, no crackles, ronchus or wheezings  CVS: distinct S1 & S2, normal rate, regular rhythm  Abdomen: soft, non-tender, non-distended, normoactive bowel sounds  Back: no CVA tenderness  Extremities: no edema of both legs  Skin: no rash  Neuro: awake, alert, oriented, hard of hearing        Laboratory Results:  Lab Results   Component Value Date    WBC 8 70 09/01/2022    HGB 15 7 09/01/2022    HCT 46 0 09/01/2022    MCV 83 11/06/2019     09/01/2022     Lab Results Component Value Date    SODIUM 140 09/01/2022    K 4 0 09/01/2022     09/01/2022    CO2 28 09/01/2022    BUN 23 09/01/2022    CREATININE 1 47 09/01/2022    GLUC 141 09/01/2022    CALCIUM 10 1 09/01/2022     Lab Results   Component Value Date    PTH 35 9 09/01/2022    CALCIUM 10 1 09/01/2022    PHOS 2 7 09/01/2022               Portions of the record may have been created with voice recognition software  Occasional wrong word or "sound a like" substitutions may have occurred due to the inherent limitations of voice recognition software  Read the chart carefully and recognize, using context, where substitutions have occurred  If you have any questions, please contact the dictating provider

## 2022-10-18 NOTE — PATIENT INSTRUCTIONS
As we discussed in the office visit, based on your most recent blood test your kidney function remains stable for several years  Remember to stay well-hydrated  Remember to follow low-salt diet  Remember to good diabetes control  Avoid NSAIDs (no ibuprofen, Motrin, Advil, Aleve, naproxen)  Okay to take Tylenol or paracetamol or acetaminophen as needed for pain  I would like to see you back in the office in 1 year with repeat labs    Continue close follow-up primary care doctor and your cardiologist

## 2022-11-05 DIAGNOSIS — E11.42 TYPE 2 DIABETES MELLITUS WITH DIABETIC POLYNEUROPATHY, WITH LONG-TERM CURRENT USE OF INSULIN (HCC): ICD-10-CM

## 2022-11-05 DIAGNOSIS — Z79.4 TYPE 2 DIABETES MELLITUS WITH DIABETIC POLYNEUROPATHY, WITH LONG-TERM CURRENT USE OF INSULIN (HCC): ICD-10-CM

## 2022-11-07 ENCOUNTER — TELEPHONE (OUTPATIENT)
Dept: FAMILY MEDICINE CLINIC | Facility: CLINIC | Age: 82
End: 2022-11-07

## 2022-11-07 DIAGNOSIS — Z79.4 TYPE 2 DIABETES MELLITUS WITH DIABETIC POLYNEUROPATHY, WITH LONG-TERM CURRENT USE OF INSULIN (HCC): ICD-10-CM

## 2022-11-07 DIAGNOSIS — E11.42 TYPE 2 DIABETES MELLITUS WITH DIABETIC POLYNEUROPATHY, WITH LONG-TERM CURRENT USE OF INSULIN (HCC): ICD-10-CM

## 2022-11-07 RX ORDER — FLASH GLUCOSE SENSOR
KIT MISCELLANEOUS
Qty: 2 EACH | Refills: 5 | Status: SHIPPED | OUTPATIENT
Start: 2022-11-07

## 2022-11-07 NOTE — TELEPHONE ENCOUNTER
Patient left a second message regarding nain 2 equipment  States prescription   Requests call back to notify when sent

## 2022-12-02 DIAGNOSIS — E78.2 MIXED HYPERLIPIDEMIA: ICD-10-CM

## 2022-12-02 RX ORDER — ATORVASTATIN CALCIUM 40 MG/1
TABLET, FILM COATED ORAL
Qty: 90 TABLET | Refills: 1 | Status: SHIPPED | OUTPATIENT
Start: 2022-12-02

## 2023-03-03 DIAGNOSIS — E78.2 MIXED HYPERLIPIDEMIA: ICD-10-CM

## 2023-03-07 RX ORDER — ATORVASTATIN CALCIUM 40 MG/1
TABLET, FILM COATED ORAL
Qty: 90 TABLET | Refills: 1 | Status: SHIPPED | OUTPATIENT
Start: 2023-03-07

## 2023-03-31 ENCOUNTER — DOCUMENTATION (OUTPATIENT)
Dept: NEPHROLOGY | Facility: CLINIC | Age: 83
End: 2023-03-31

## 2023-04-23 DIAGNOSIS — E11.42 TYPE 2 DIABETES MELLITUS WITH DIABETIC POLYNEUROPATHY, WITH LONG-TERM CURRENT USE OF INSULIN (HCC): ICD-10-CM

## 2023-04-23 DIAGNOSIS — Z79.4 TYPE 2 DIABETES MELLITUS WITH DIABETIC POLYNEUROPATHY, WITH LONG-TERM CURRENT USE OF INSULIN (HCC): ICD-10-CM

## 2023-06-01 ENCOUNTER — TELEPHONE (OUTPATIENT)
Dept: NEPHROLOGY | Facility: CLINIC | Age: 83
End: 2023-06-01

## 2023-06-01 NOTE — TELEPHONE ENCOUNTER
Patient called and left a voicemail stating he is having pain on the left side  Patient stated that the other night the pain was so bad it went all over  I called patient but had to leave a voicemail  Please advise

## 2023-06-01 NOTE — TELEPHONE ENCOUNTER
Pt is aware  Called patient PCP office too keep them in the loop and left a voicemail with the following information: patient is pain is severe he should be seeing at urgent care or ER  Recommend to contact his PCP to be evaluated sooner, if patient and or PCP suspected is stone related then he should see urology  Advised the PCP office to call the office to let us know they received the message

## 2023-06-02 ENCOUNTER — TELEPHONE (OUTPATIENT)
Dept: FAMILY MEDICINE CLINIC | Facility: CLINIC | Age: 83
End: 2023-06-02

## 2023-06-02 NOTE — TELEPHONE ENCOUNTER
Received a voicemail from patients pcp office stating they received the message and will make Dr Sade Reyes aware      Call back is [phone]

## 2023-06-02 NOTE — TELEPHONE ENCOUNTER
Voicemail:  Good afternoon  I'm calling from  OF THE [facility] office  In regard to the patients, Gurvinder Guan, the MRN is [id number] and I'm just calling in regards Doctor Cindy Moseley, The patient has collapsed and let us know that he have some pain  So I'm calling just to let you know if you can you see Doctor Smith's message from today that you recommend that if the patient has some several pain, he should be seeing at the urgent care or the and he also recommended to contact you guys for to be evaluated sooner  Appointment  Can you please call me back and let me know if you see Doctor Smith's message? Thank you and I give you the phone number  It's [phone]  Thank you and have a great day  Bye  This was left for us as an FYI - I phoned the office and left a voicemail that we received this information and it was routed to Dr Minerva Mcneil

## 2023-07-09 DIAGNOSIS — Z79.4 TYPE 2 DIABETES MELLITUS WITH DIABETIC POLYNEUROPATHY, WITH LONG-TERM CURRENT USE OF INSULIN (HCC): ICD-10-CM

## 2023-07-09 DIAGNOSIS — E11.42 TYPE 2 DIABETES MELLITUS WITH DIABETIC POLYNEUROPATHY, WITH LONG-TERM CURRENT USE OF INSULIN (HCC): ICD-10-CM

## 2023-07-09 DIAGNOSIS — I10 BENIGN ESSENTIAL HYPERTENSION: ICD-10-CM

## 2023-07-10 RX ORDER — AMLODIPINE BESYLATE 5 MG/1
5 TABLET ORAL DAILY
Qty: 90 TABLET | Refills: 1 | Status: SHIPPED | OUTPATIENT
Start: 2023-07-10

## 2023-07-10 RX ORDER — METFORMIN HYDROCHLORIDE 500 MG/1
TABLET, EXTENDED RELEASE ORAL
Qty: 180 TABLET | Refills: 1 | Status: SHIPPED | OUTPATIENT
Start: 2023-07-10

## 2023-07-31 ENCOUNTER — TELEPHONE (OUTPATIENT)
Dept: UROLOGY | Facility: AMBULATORY SURGERY CENTER | Age: 83
End: 2023-07-31

## 2023-07-31 NOTE — TELEPHONE ENCOUNTER
Call transferred by psr. Last seen in our offic on 12/16/19. Reports the last two day stream is reported as slow . Reports burning sensation and pain at penis , and below the belly button. No nausea, vomiting , fever or chills. Patient has not been seen since 2019 . Due to not appt availability advised to contact pcp for an appt today.  Appt given to [facility] with our practice for 10/8/23 per patients request. He does not want to travel to a different location due to distance

## 2023-07-31 NOTE — TELEPHONE ENCOUNTER
Patient calling in stating he is having trouble urinating and when he does it is very little amounts and having burning with urination. Patient requesting to speak with  Nursing staff. Call was transferred to triage nurse.

## 2023-08-01 ENCOUNTER — TELEPHONE (OUTPATIENT)
Dept: UROLOGY | Facility: CLINIC | Age: 83
End: 2023-08-01

## 2023-08-08 ENCOUNTER — OFFICE VISIT (OUTPATIENT)
Dept: FAMILY MEDICINE CLINIC | Facility: CLINIC | Age: 83
End: 2023-08-08
Payer: COMMERCIAL

## 2023-08-08 VITALS
HEART RATE: 56 BPM | RESPIRATION RATE: 18 BRPM | SYSTOLIC BLOOD PRESSURE: 135 MMHG | OXYGEN SATURATION: 98 % | TEMPERATURE: 97.6 F | HEIGHT: 63 IN | DIASTOLIC BLOOD PRESSURE: 60 MMHG | WEIGHT: 191.2 LBS | BODY MASS INDEX: 33.88 KG/M2

## 2023-08-08 DIAGNOSIS — E11.42 TYPE 2 DIABETES MELLITUS WITH DIABETIC POLYNEUROPATHY, WITH LONG-TERM CURRENT USE OF INSULIN (HCC): Primary | ICD-10-CM

## 2023-08-08 DIAGNOSIS — I10 ESSENTIAL HYPERTENSION: ICD-10-CM

## 2023-08-08 DIAGNOSIS — Z00.00 MEDICARE ANNUAL WELLNESS VISIT, SUBSEQUENT: ICD-10-CM

## 2023-08-08 DIAGNOSIS — I48.91 ATRIAL FIBRILLATION, UNSPECIFIED TYPE (HCC): ICD-10-CM

## 2023-08-08 DIAGNOSIS — Z79.4 TYPE 2 DIABETES MELLITUS WITH DIABETIC POLYNEUROPATHY, WITH LONG-TERM CURRENT USE OF INSULIN (HCC): Primary | ICD-10-CM

## 2023-08-08 DIAGNOSIS — E66.9 OBESITY (BMI 30-39.9): ICD-10-CM

## 2023-08-08 DIAGNOSIS — E78.5 HYPERLIPIDEMIA, UNSPECIFIED HYPERLIPIDEMIA TYPE: ICD-10-CM

## 2023-08-08 DIAGNOSIS — I63.9 CEREBROVASCULAR ACCIDENT (CVA), UNSPECIFIED MECHANISM (HCC): ICD-10-CM

## 2023-08-08 PROBLEM — N40.0 BENIGN ENLARGEMENT OF PROSTATE: Status: ACTIVE | Noted: 2023-08-08

## 2023-08-08 PROBLEM — U07.1 COVID-19: Status: RESOLVED | Noted: 2022-05-03 | Resolved: 2023-08-08

## 2023-08-08 PROCEDURE — G0439 PPPS, SUBSEQ VISIT: HCPCS | Performed by: FAMILY MEDICINE

## 2023-08-08 PROCEDURE — 99214 OFFICE O/P EST MOD 30 MIN: CPT | Performed by: FAMILY MEDICINE

## 2023-08-08 RX ORDER — METOPROLOL TARTRATE 50 MG/1
TABLET, FILM COATED ORAL
COMMUNITY
Start: 2023-07-23

## 2023-08-08 NOTE — ASSESSMENT & PLAN NOTE
Lab Results   Component Value Date    HGBA1C 6.8 (H) 04/27/2023     Controlled. Low carb diet. Continue metformin 500mg bid and novolin 30u bid.  endocrinology in [place].

## 2023-08-08 NOTE — PATIENT INSTRUCTIONS
Medicare Preventive Visit Patient Instructions  Thank you for completing your Welcome to Medicare Visit or Medicare Annual Wellness Visit today. Your next wellness visit will be due in one year (8/8/2024). The screening/preventive services that you may require over the next 5-10 years are detailed below. Some tests may not apply to you based off risk factors and/or age. Screening tests ordered at today's visit but not completed yet may show as past due. Also, please note that scanned in results may not display below. Preventive Screenings:  Service Recommendations Previous Testing/Comments   Colorectal Cancer Screening  · Colonoscopy    · Fecal Occult Blood Test (FOBT)/Fecal Immunochemical Test (FIT)  · Fecal DNA/Cologuard Test  · Flexible Sigmoidoscopy Age: 43-73 years old   Colonoscopy: every 10 years (May be performed more frequently if at higher risk)  OR  FOBT/FIT: every 1 year  OR  Cologuard: every 3 years  OR  Sigmoidoscopy: every 5 years  Screening may be recommended earlier than age 39 if at higher risk for colorectal cancer. Also, an individualized decision between you and your healthcare provider will decide whether screening between the ages of 77-80 would be appropriate.  Colonoscopy: 09/08/2015  FOBT/FIT: Not on file  Cologuard: Not on file  Sigmoidoscopy: Not on file          Prostate Cancer Screening Individualized decision between patient and health care provider in men between ages of 53-66   Medicare will cover every 12 months beginning on the day after your 50th birthday PSA: No results in last 5 years     Screening Not Indicated     Hepatitis C Screening Once for adults born between 1945 and 1965  More frequently in patients at high risk for Hepatitis C Hep C Antibody: Not on file        Diabetes Screening 1-2 times per year if you're at risk for diabetes or have pre-diabetes Fasting glucose: 107 mg/dL (10/7/2019)  A1C: 6.8 % (4/27/2023)  Screening Not Indicated  History Diabetes Cholesterol Screening Once every 5 years if you don't have a lipid disorder. May order more often based on risk factors. Lipid panel: 04/27/2023  Screening Not Indicated  History Lipid Disorder      Other Preventive Screenings Covered by Medicare:  1. Abdominal Aortic Aneurysm (AAA) Screening: covered once if your at risk. You're considered to be at risk if you have a family history of AAA or a male between the age of 70-76 who smoking at least 100 cigarettes in your lifetime. 2. Lung Cancer Screening: covers low dose CT scan once per year if you meet all of the following conditions: (1) Age 48-67; (2) No signs or symptoms of lung cancer; (3) Current smoker or have quit smoking within the last 15 years; (4) You have a tobacco smoking history of at least 20 pack years (packs per day x number of years you smoked); (5) You get a written order from a healthcare provider. 3. Glaucoma Screening: covered annually if you're considered high risk: (1) You have diabetes OR (2) Family history of glaucoma OR (3)  aged 48 and older OR (3)  American aged 72 and older  3. Osteoporosis Screening: covered every 2 years if you meet one of the following conditions: (1) Have a vertebral abnormality; (2) On glucocorticoid therapy for more than 3 months; (3) Have primary hyperparathyroidism; (4) On osteoporosis medications and need to assess response to drug therapy. 5. HIV Screening: covered annually if you're between the age of 14-79. Also covered annually if you are younger than 13 and older than 72 with risk factors for HIV infection. For pregnant patients, it is covered up to 3 times per pregnancy.     Immunizations:  Immunization Recommendations   Influenza Vaccine Annual influenza vaccination during flu season is recommended for all persons aged >= 6 months who do not have contraindications   Pneumococcal Vaccine   * Pneumococcal conjugate vaccine = PCV13 (Prevnar 13), PCV15 (Vaxneuvance), PCV20 (Prevnar 20)  * Pneumococcal polysaccharide vaccine = PPSV23 (Pneumovax) Adults 2364 years old: 1-3 doses may be recommended based on certain risk factors  Adults 72 years old: 1-2 doses may be recommended based off what pneumonia vaccine you previously received   Hepatitis B Vaccine 3 dose series if at intermediate or high risk (ex: diabetes, end stage renal disease, liver disease)   Tetanus (Td) Vaccine - COST NOT COVERED BY MEDICARE PART B Following completion of primary series, a booster dose should be given every 10 years to maintain immunity against tetanus. Td may also be given as tetanus wound prophylaxis. Tdap Vaccine - COST NOT COVERED BY MEDICARE PART B Recommended at least once for all adults. For pregnant patients, recommended with each pregnancy. Shingles Vaccine (Shingrix) - COST NOT COVERED BY MEDICARE PART B  2 shot series recommended in those aged 48 and above     Health Maintenance Due:      Topic Date Due   • Colorectal Cancer Screening  09/08/2020     Immunizations Due:      Topic Date Due   • COVID-19 Vaccine (3 - Moderna series) 04/17/2021   • Influenza Vaccine (1) 09/01/2023     Advance Directives   What are advance directives? Advance directives are legal documents that state your wishes and plans for medical care. These plans are made ahead of time in case you lose your ability to make decisions for yourself. Advance directives can apply to any medical decision, such as the treatments you want, and if you want to donate organs. What are the types of advance directives? There are many types of advance directives, and each state has rules about how to use them. You may choose a combination of any of the following:  · Living will: This is a written record of the treatment you want. You can also choose which treatments you do not want, which to limit, and which to stop at a certain time. This includes surgery, medicine, IV fluid, and tube feedings.    · Durable power of  for healthcare [facility]): This is a written record that states who you want to make healthcare choices for you when you are unable to make them for yourself. This person, called a proxy, is usually a family member or a friend. You may choose more than 1 proxy. · Do not resuscitate (DNR) order:  A DNR order is used in case your heart stops beating or you stop breathing. It is a request not to have certain forms of treatment, such as CPR. A DNR order may be included in other types of advance directives. · Medical directive: This covers the care that you want if you are in a coma, near death, or unable to make decisions for yourself. You can list the treatments you want for each condition. Treatment may include pain medicine, surgery, blood transfusions, dialysis, IV or tube feedings, and a ventilator (breathing machine). · Values history: This document has questions about your views, beliefs, and how you feel and think about life. This information can help others choose the care that you would choose. Why are advance directives important? An advance directive helps you control your care. Although spoken wishes may be used, it is better to have your wishes written down. Spoken wishes can be misunderstood, or not followed. Treatments may be given even if you do not want them. An advance directive may make it easier for your family to make difficult choices about your care. Weight Management   Why it is important to manage your weight:  Being overweight increases your risk of health conditions such as heart disease, high blood pressure, type 2 diabetes, and certain types of cancer. It can also increase your risk for osteoarthritis, sleep apnea, and other respiratory problems. Aim for a slow, steady weight loss. Even a small amount of weight loss can lower your risk of health problems. How to lose weight safely:  A safe and healthy way to lose weight is to eat fewer calories and get regular exercise.  You can lose up about 1 pound a week by decreasing the number of calories you eat by 500 calories each day. Healthy meal plan for weight management:  A healthy meal plan includes a variety of foods, contains fewer calories, and helps you stay healthy. A healthy meal plan includes the following:  · Eat whole-grain foods more often. A healthy meal plan should contain fiber. Fiber is the part of grains, fruits, and vegetables that is not broken down by your body. Whole-grain foods are healthy and provide extra fiber in your diet. Some examples of whole-grain foods are whole-wheat breads and pastas, oatmeal, brown rice, and bulgur. · Eat a variety of vegetables every day. Include dark, leafy greens such as spinach, kale, chad greens, and mustard greens. Eat yellow and orange vegetables such as carrots, sweet potatoes, and winter squash. · Eat a variety of fruits every day. Choose fresh or canned fruit (canned in its own juice or light syrup) instead of juice. Fruit juice has very little or no fiber. · Eat low-fat dairy foods. Drink fat-free (skim) milk or 1% milk. Eat fat-free yogurt and low-fat cottage cheese. Try low-fat cheeses such as mozzarella and other reduced-fat cheeses. · Choose meat and other protein foods that are low in fat. Choose beans or other legumes such as split peas or lentils. Choose fish, skinless poultry (chicken or turkey), or lean cuts of red meat (beef or pork). Before you cook meat or poultry, cut off any visible fat. · Use less fat and oil. Try baking foods instead of frying them. Add less fat, such as margarine, sour cream, regular salad dressing and mayonnaise to foods. Eat fewer high-fat foods. Some examples of high-fat foods include french fries, doughnuts, ice cream, and cakes. · Eat fewer sweets. Limit foods and drinks that are high in sugar. This includes candy, cookies, regular soda, and sweetened drinks. Exercise:  Exercise at least 30 minutes per day on most days of the week.  Some examples of exercise include walking, biking, dancing, and swimming. You can also fit in more physical activity by taking the stairs instead of the elevator or parking farther away from stores. Ask your healthcare provider about the best exercise plan for you. © Copyright [employer] 2018 Information is for End User's use only and may not be sold, redistributed or otherwise used for commercial purposes.  All illustrations and images included in CareNotes® are the copyrighted property of [employer] or [address] [place] [address]

## 2023-08-08 NOTE — PROGRESS NOTES
Assessment and Plan:     Problem List Items Addressed This Visit        Endocrine    Type 2 diabetes mellitus with diabetic polyneuropathy, with long-term current use of insulin ([address]) - Primary       Lab Results   Component Value Date    HGBA1C 6.8 (H) 04/27/2023     Controlled. Low carb diet. Continue metformin 500mg bid and novolin 30u bid.  endocrinology in [place]. Cardiovascular and Mediastinum    Cerebrovascular accident [facility])     Right leg weakness. Walks with a walker. Essential hypertension     Controlled. DASH diet. Continue amlodipine 5mg QD and metoprolol 50mg per cardiology. Relevant Medications    metoprolol tartrate (LOPRESSOR) 50 mg tablet    Atrial fibrillation (HCC)     Continue coumadin per cardiology. Relevant Medications    metoprolol tartrate (LOPRESSOR) 50 mg tablet       Other    Hyperlipidemia     Continue atorvastatin 40mg qhs. Obesity (BMI 30-39. 9)   Other Visit Diagnoses     Medicare annual wellness visit, subsequent            BMI Counseling: Body mass index is 33.87 kg/m². Follow-up plan was not completed due to elderly patient (72 years old) where weight reduction/weight gain would complicate underlying health condition such as: illness or physical disability. Depression Screening and Follow-up Plan: Patient was screened for depression during today's encounter. They screened negative with a PHQ-2 score of 0. Refused flu shot because he is allergic to it.   Got PCV 13 in 2015 and pneumovax in 2016.   Got Covid19 vaccines and booster. Fall precautions.   RTO in 1 year.      POA---daughter Benjamen No  Living will----DNR if "brain dead" per pt. Preventive health issues were discussed with patient, and age appropriate screening tests were ordered as noted in patient's After Visit Summary.   Personalized health advice and appropriate referrals for health education or preventive services given if needed, as noted in patient's After Visit Summary. History of Present Illness:     Patient presents for a Medicare Wellness Visit    HPI     Pt is here by himself.       DM---4/2023 hgA1C 6.8 stable. [place] 6 [place]. He is on meformin 500mg bid. Denies side effects. He is novolin 30u bid.   Blood sugar at home 100-150 per pt. He has FreeStyle sanjay. Had foot neuropathy. FU ophthalmology Dr. Eugenie Garcia on [facility]. FU podiatry [place] 2-[place] [facility]. No problem per pt.      HTN----He is on amlodipine 5mg QD and metoprolol 50mg QD. BP at home 130/70 per pt.   Hx of CVA in 3/2017. Right leg feels weakness. Use walker which helped.    Afib---He is on coumadin. Cardiology follows PT/INR monthly.    S/p packemaker 11/2017. FU cardiology from [facility] regularly.      Hyperlipidemia---Pt is on atorvastatin 40mg qhs. Denies side effects. 4/2023 lipid panel good.      Lives by himself. Walks with walker/cane. Does all ADL's. Still drive. has [employer]. Denies recent falls. Denies depression.       Patient Care Team:  Jessie Zaragoza MD as PCP - MD Jessie Colon MD Gwendolynn Mano, MD     Review of Systems:     Review of Systems   Constitutional: Negative for appetite change, chills and fever. HENT: Negative for congestion, ear pain, sinus pain and sore throat. Eyes: Negative for discharge and itching. Respiratory: Negative for apnea, cough, chest tightness, shortness of breath and wheezing. Cardiovascular: Negative for chest pain, palpitations and leg swelling. Gastrointestinal: Negative for abdominal pain, anal bleeding, constipation, diarrhea, nausea and vomiting. Endocrine: Negative for cold intolerance, heat intolerance and polyuria. Genitourinary: Negative for difficulty urinating and dysuria. Musculoskeletal: Positive for gait problem. Negative for arthralgias, back pain and myalgias. Skin: Negative for rash. Neurological: Negative for dizziness and headaches. Psychiatric/Behavioral: Negative for agitation. Problem List:     Patient Active Problem List   Diagnosis   • Type 2 diabetes mellitus with diabetic polyneuropathy, with long-term current use of insulin ([address])   • Coronary artery disease   • Hyperlipidemia   • Bilateral carotid artery disease (HCC)   • Cerebrovascular accident [facility])   • Abdominal bruit   • Actinic keratosis   • Allergic rhinitis   • Essential hypertension   • Chronic kidney disease, stage 3 ([facility])   • CAD (coronary artery disease) of artery bypass graft   • Erectile dysfunction of non-organic origin   • Gait instability   • Left low back pain   • Myelomalacia [facility])   • Myofascial pain   • Nephrolithiasis   • Obstructive sleep apnea   • Osteoarthritis   • Other specified forms of chronic ischemic heart disease   • Atrial fibrillation ([address])   • Peripheral vascular disease ([address])   • Pulmonary nodule seen on imaging study   • S/P cardiac pacemaker procedure   • Tachy-brice syndrome (HCC)   • History of renal calculi   • Subacromial bursitis of left shoulder joint   • Rotator cuff tendinitis, left   • Obesity (BMI 30-39. 9)   • Microalbuminuria   • Memory loss   • Sensorineural hearing loss (SNHL) of both ears   • Microscopic hematuria   • Benign enlargement of prostate      Past Medical and Surgical History:     Past Medical History:   Diagnosis Date   • Arthritis    • Carotid stenosis    • Coronary artery disease     Last Assessed:  11/5/13   • COVID-19 5/3/2022   • CVA (cerebral vascular accident) [facility])     Last Assessed:  1/10/18   • Diabetes mellitus ([address])     Controlled with kidney complication, Last Assessed:  1/10/18   • Hematuria    • Hyperlipidemia    • Hypertension    • Kidney stone    • Renal disorder    • Trigger finger    • Vitamin D deficiency      Past Surgical History:   Procedure Laterality Date   • CARDIAC SURGERY     • CIRCUMCISION      Resolved:  1964, Elective   • COLONOSCOPY      Resolved:  2006   • CORONARY ARTERY BYPASS GRAFT  2000    6 vesels   • CYSTOSCOPY      Resolved:  4/9/10, Diagnostic   • CYSTOSCOPY  12/14/2018    Dr. Farhat Brady   • ELBOW SURGERY     • FL INJECTION LEFT SHOULDER (ARTHROGRAM)  7/31/2019   • FRACTURE SURGERY      left forearm fx with plate repair. • HUMERUS FRACTURE SURGERY      Open, Greater tuberosity   • LITHOTRIPSY  09/12/2013    Renal   • NEPHROSTOMY Bilateral     With drainage   • VASECTOMY        Family History:     Family History   Problem Relation Age of Onset   • Heart attack Mother    • Coronary artery disease Mother    • Emphysema Father    • Cancer Brother         Leukemia   • Hypertension Son    • Diabetes Maternal Grandfather    • Hypertension Other    • Other Other         Cerebrovascular accident (CVA)      Social History:     Social History     Socioeconomic History   • Marital status:      Spouse name: None   • Number of children: None   • Years of education: None   • Highest education level: None   Occupational History   • None   Tobacco Use   • Smoking status: Former     Packs/day: 1.50     Years: 40.00     Total pack years: 60.00     Types: Cigarettes   • Smokeless tobacco: Never   • Tobacco comments:     quit 30 years ago. Substance and Sexual Activity   • Alcohol use: No   • Drug use: No   • Sexual activity: None   Other Topics Concern   • None   Social History Narrative    Daily caffeine consumption, 1 serving a day     Social Determinants of Health     Financial Resource Strain: Not on file   Food Insecurity: Not on file   Transportation Needs: Not on file   Physical Activity: Not on file   Stress: Not on file   Social Connections: Not on file   Intimate Partner Violence: Not on file   Housing Stability: Not on file      Medications and Allergies:     Current Outpatient Medications   Medication Sig Dispense Refill   • amLODIPine (NORVASC) 5 mg tablet TAKE 1 TABLET (5 MG TOTAL) BY MOUTH DAILY.  90 tablet 1   • ammonium lactate (LAC-HYDRIN) 12 % lotion APPLY TO AFFECTED AREA OF SKIN UP TO TWICE DAILY AS NEEDED WITH FLARES     • atorvastatin (LIPITOR) 40 mg tablet TAKE 1 TABLET BY MOUTH EVERY DAY 90 tablet 1   • Continuous Blood Gluc  (FreeStyle Iker 2 [place]) ALEJANDRINA Use as instructed. 1 each 1   • Continuous Blood Gluc Sensor (FreeStyle Iker 2 Sensor) MISC USE AS INSTRUCTED 2 each 5   • cyanocobalamin 1000 MCG tablet Take 1 tablet by mouth daily 30 tablet 3   • insulin aspart (NovoLOG) 100 Units/mL injection pen Novolog Flexpen U-100 Insulin   2-8 units daily     • metFORMIN (GLUCOPHAGE-XR) 500 mg 24 hr tablet TAKE 1 TABLET BY MOUTH TWICE A DAY WITH MEALS 180 tablet 1   • Misc. Devices (ROLLATOR ULTRA-LIGHT) MISC by Does not apply route daily 1 each 0   • NOVOLIN N 100 UNIT/ML subcutaneous injection 12 units in the morning and 22 units in the evening (Patient taking differently: 30 units in the morning and 30 units in the evening) 10 mL 0   • warfarin (COUMADIN) 5 mg tablet Take 5 mg by mouth daily      • metoprolol tartrate (LOPRESSOR) 50 mg tablet TAKE 1 TABLET BY MOUTH 2 (TWO) TIMES A DAY. INCREASE METOPROLOL TARTRATE 50 MG TO TWICE A DAY       No current facility-administered medications for this visit.      Allergies   Allergen Reactions   • Metformin GI Intolerance   • Haemophilus Influenzae Rash   • Influenza Virus Vaccine Rash      Immunizations:     Immunization History   Administered Date(s) Administered   • COVID-19 MODERNA VACC 0.5 ML IM 01/22/2021, 02/20/2021   • INFLUENZA 01/01/2002, 04/08/2004, 01/01/2006, 11/01/2006   • Pneumococcal 01/01/2000, 11/10/2006   • Pneumococcal Conjugate 13-Valent 05/05/2015   • Pneumococcal Polysaccharide PPV23 11/09/2006, 05/26/2016      Health Maintenance:         Topic Date Due   • Colorectal Cancer Screening  09/08/2020         Topic Date Due   • COVID-19 Vaccine (3 - Luz Palm series) 04/17/2021   • Influenza Vaccine (1) 09/01/2023      Medicare Screening Tests and Risk Assessments:     Edel Nunez is here for his Subsequent Wellness visit. Health Risk Assessment:   Patient rates overall health as fair. Patient feels that their physical health rating is same. Patient is very satisfied with their life. Eyesight was rated as same. Hearing was rated as same. Patient feels that their emotional and mental health rating is same. Patients states they are never, rarely angry. Patient states they are never, rarely unusually tired/fatigued. Pain experienced in the last 7 days has been none. Patient states that he has experienced no weight loss or gain in last 6 months. Fall Risk Screening: In the past year, patient has experienced: no history of falling in past year      Home Safety:  Patient does not have trouble with stairs inside or outside of their home. Patient has working smoke alarms and has working carbon monoxide detector. Home safety hazards include: none. Nutrition:   Current diet is Regular and Limited junk food. Medications:   Patient is currently taking over-the-counter supplements. OTC medications include: see medication list. Patient is able to manage medications. Activities of Daily Living (ADLs)/Instrumental Activities of Daily Living (IADLs):   Walk and transfer into and out of bed and chair?: Yes  Dress and groom yourself?: Yes    Bathe or shower yourself?: Yes    Feed yourself? Yes  Do your laundry/housekeeping?: Yes  Manage your money, pay your bills and track your expenses?: Yes  Make your own meals?: Yes    Do your own shopping?: Yes    Previous Hospitalizations:   Any hospitalizations or ED visits within the last 12 months?: No      Advance Care Planning:   Living will: Yes    Durable POA for healthcare:  Yes    Advanced directive: Yes    End of Life Decisions reviewed with patient: Yes    Provider agrees with end of life decisions: Yes      Cognitive Screening:   Provider or family/friend/caregiver concerned regarding cognition?: No    PREVENTIVE SCREENINGS      Cardiovascular Screening: General: History Lipid Disorder and Screening Current      Diabetes Screening:     General: History Diabetes and Screening Current      Colorectal Cancer Screening:     General: Screening Not Indicated      Prostate Cancer Screening:    General: Screening Not Indicated      Abdominal Aortic Aneurysm (AAA) Screening:    Risk factors include: tobacco use        Lung Cancer Screening:     General: Screening Not Indicated    Screening, Brief Intervention, and Referral to Treatment (SBIRT)    Screening  Typical number of drinks in a day: 0  Typical number of drinks in a week: 0  Interpretation: Low risk drinking behavior. Single Item Drug Screening:  How often have you used an illegal drug (including marijuana) or a prescription medication for non-medical reasons in the past year? never    Single Item Drug Screen Score: 0  Interpretation: Negative screen for possible drug use disorder    No results found. Physical Exam:     /60   Pulse 56   Temp 97.6 °F (36.4 °C) (Temporal)   Resp 18   Ht 5' 3" (1.6 m)   Wt 86.7 kg (191 lb 3.2 oz)   SpO2 98%   BMI 33.87 kg/m²     Physical Exam  Vitals reviewed. Constitutional:       Appearance: Normal appearance. HENT:      Head: Normocephalic and atraumatic. Eyes:      General:         Right eye: No discharge. Left eye: No discharge. Conjunctiva/sclera: Conjunctivae normal.   Neck:      Vascular: No carotid bruit. Cardiovascular:      Rate and Rhythm: Normal rate. Rhythm irregular. Heart sounds: Normal heart sounds. No murmur heard. No friction rub. No gallop. Pulmonary:      Effort: Pulmonary effort is normal. No respiratory distress. Breath sounds: Normal breath sounds. No wheezing or rales. Abdominal:      General: Bowel sounds are normal. There is no distension. Palpations: Abdomen is soft. Tenderness: There is no abdominal tenderness. Musculoskeletal:         General: No swelling, tenderness or deformity. Cervical back: Normal range of motion and neck supple. No muscular tenderness. Comments: Walk with a walker   Lymphadenopathy:      Cervical: No cervical adenopathy. Neurological:      Mental Status: He is alert.    Psychiatric:         Mood and Affect: Mood normal.          Jorge Cotto MD

## 2023-09-29 DIAGNOSIS — Z79.4 TYPE 2 DIABETES MELLITUS WITH DIABETIC POLYNEUROPATHY, WITH LONG-TERM CURRENT USE OF INSULIN (HCC): ICD-10-CM

## 2023-09-29 DIAGNOSIS — E11.42 TYPE 2 DIABETES MELLITUS WITH DIABETIC POLYNEUROPATHY, WITH LONG-TERM CURRENT USE OF INSULIN (HCC): ICD-10-CM

## 2023-10-02 ENCOUNTER — TELEPHONE (OUTPATIENT)
Dept: NEPHROLOGY | Facility: CLINIC | Age: 83
End: 2023-10-02

## 2023-10-09 ENCOUNTER — TELEPHONE (OUTPATIENT)
Dept: NEPHROLOGY | Facility: CLINIC | Age: 83
End: 2023-10-09

## 2023-10-09 NOTE — TELEPHONE ENCOUNTER
Called patient reminding to please complete labwork prior to 10/17 appointment with Dr. Seng Velázquez. Patient   said will do labs tomorrow.

## 2023-10-12 ENCOUNTER — DOCUMENTATION (OUTPATIENT)
Dept: NEPHROLOGY | Facility: CLINIC | Age: 83
End: 2023-10-12

## 2023-10-12 ENCOUNTER — TELEPHONE (OUTPATIENT)
Dept: NEPHROLOGY | Facility: CLINIC | Age: 83
End: 2023-10-12

## 2023-10-12 LAB
EXT GLUCOSE BLD: 124
EXTERNAL ALBUMIN: 4.3
EXTERNAL ANION GAP: 9
EXTERNAL BUN: 26
EXTERNAL CALCIUM: 9.7
EXTERNAL CHLORIDE: 106
EXTERNAL CO2: 29
EXTERNAL CREATININE: 1.68
EXTERNAL EGFR: 40
EXTERNAL PHOSPHORUS: 2.9
EXTERNAL POTASSIUM: 4.7
EXTERNAL PTH: 29.9
EXTERNAL SODIUM: 144
HCT VFR BLD AUTO: 45.5 % (ref 36.5–49.3)
HGB BLD-MCNC: 15.1 G/DL (ref 12–17)
MCH (CD4/8) (HISTORICAL): 27.9
MCHC (CD4/8) (HISTORICAL): 33.1
MCV (CD4/8) (HISTORICAL): 84
PLATELET # BLD AUTO: 232 THOUSANDS/UL (ref 149–390)
RDW (CD4/8) (HISTORICAL): 14.9
WBC # BLD AUTO: 11.2 THOUSAND/UL

## 2023-10-12 NOTE — TELEPHONE ENCOUNTER
----- Message from Tracie Nolan MD sent at 10/11/2023 10:34 AM EDT -----  Please abstract results into patient's chart.   Thanks      ----- Message -----  From: Interface, Transcription Incoming  Sent: 10/11/2023   9:57 AM EDT  To: Tracie Nolan MD

## 2023-10-16 DIAGNOSIS — E78.2 MIXED HYPERLIPIDEMIA: ICD-10-CM

## 2023-10-16 RX ORDER — ATORVASTATIN CALCIUM 40 MG/1
TABLET, FILM COATED ORAL
Qty: 90 TABLET | Refills: 1 | Status: SHIPPED | OUTPATIENT
Start: 2023-10-16

## 2023-10-17 ENCOUNTER — OFFICE VISIT (OUTPATIENT)
Dept: NEPHROLOGY | Facility: CLINIC | Age: 83
End: 2023-10-17

## 2023-10-17 VITALS
WEIGHT: 196 LBS | SYSTOLIC BLOOD PRESSURE: 128 MMHG | HEART RATE: 70 BPM | BODY MASS INDEX: 34.73 KG/M2 | OXYGEN SATURATION: 96 % | HEIGHT: 63 IN | DIASTOLIC BLOOD PRESSURE: 60 MMHG

## 2023-10-17 DIAGNOSIS — Z79.4 TYPE 2 DIABETES MELLITUS WITH DIABETIC POLYNEUROPATHY, WITH LONG-TERM CURRENT USE OF INSULIN (HCC): ICD-10-CM

## 2023-10-17 DIAGNOSIS — E11.42 TYPE 2 DIABETES MELLITUS WITH DIABETIC POLYNEUROPATHY, WITH LONG-TERM CURRENT USE OF INSULIN (HCC): ICD-10-CM

## 2023-10-17 DIAGNOSIS — E78.5 HYPERLIPIDEMIA, UNSPECIFIED HYPERLIPIDEMIA TYPE: ICD-10-CM

## 2023-10-17 DIAGNOSIS — N20.0 NEPHROLITHIASIS: ICD-10-CM

## 2023-10-17 DIAGNOSIS — N18.31 STAGE 3A CHRONIC KIDNEY DISEASE (HCC): Primary | ICD-10-CM

## 2023-10-17 DIAGNOSIS — E66.9 OBESITY (BMI 30-39.9): ICD-10-CM

## 2023-10-17 DIAGNOSIS — I77.9 BILATERAL CAROTID ARTERY DISEASE, UNSPECIFIED TYPE (HCC): ICD-10-CM

## 2023-10-17 DIAGNOSIS — I10 ESSENTIAL HYPERTENSION: ICD-10-CM

## 2023-10-17 NOTE — PATIENT INSTRUCTIONS
As we discussed in the office visit and after reviewing recent blood test your kidney function remains fairly stable, noted your creatinine is slightly elevated compared to baseline. Remember to stay well-hydrated, follow low-salt diet. Remember to avoid NSAIDs (no ibuprofen, Motrin, Advil, Aleve, naproxen). Recommend to see urology for follow-up given your history of kidney stones. Continue close follow-up with your primary care doctor.   I would like to see you back in the office in 1 year with repeat labs

## 2023-10-17 NOTE — PROGRESS NOTES
OFFICE FOLLOW UP - Nephrology   Kevin March [facility] 80 y.o. male MRN: [id number]       ASSESSMENT and PLAN:  Scooby Davis was seen today for follow-up and chronic kidney disease. Diagnoses and all orders for this visit:    Stage 3a chronic kidney disease ([address])  -     CBC; Future  -     Renal function panel; Future  -     PTH, intact; Future  -     Protein / creatinine ratio, urine; Future    Hyperlipidemia, unspecified hyperlipidemia type    Bilateral carotid artery disease, unspecified type ([address])    Type 2 diabetes mellitus with diabetic polyneuropathy, with long-term current use of insulin ([facility])    Obesity (BMI 30-39. 9)    Nephrolithiasis    Essential hypertension        This is a 80-year-old gentleman with known history of chronic kidney disease stage 3 in the setting of diabetes, hypertension, previous episode of obstructive uropathy in 2013, hyperlipidemia who returns to the office for follow-up. 1. Chronic kidney disease stage 3, previous creatinine around 1.2-1.5. 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 Most recent blood test reviewed, creatinine slightly above baseline at 1.6 with minimal proteinuria UPV 1.04  Once again discussed with patient about importance of have good blood sugar control, avoid NSAIDs, have a good blood pressure control, follow low-sodium diet . To stay well hydrated  I would like to see him back in the office in 1 year with repeat blood and urine test      2. Insulin-dependent diabetes, continue management by his family doctor. Goal to have an A1c around 7-8% given age. 3. Hypertension, blood pressure well controlled, no changes on his medications at this moment. 4. History of nephrolithiasis with episode of obstructive uropathy requiring bilateral percutaneous nephrostomy tubes in 2013.     Apparently passed a stone around 08/2023, continue to to follow a low-salt diet and drink at least 2 L of water daily. Advise to contact and continue close follow with urology     5. Mineral bone disease, most recent phosphorus and PTH within normal limits, follow labs in 1 year. 6. Hemoglobin mild anemia, most recent Hgb 11.2, follow CBC in 1 year    7. Tachy-brice syndrome status post pacemaker placement, continue follow-up with cardiologist at [facility].    8. Hyperlipidemia, continue with statins      Patient Instructions   As we discussed in the office visit and after reviewing recent blood test your kidney function remains fairly stable, noted your creatinine is slightly elevated compared to baseline. Remember to stay well-hydrated, follow low-salt diet. Remember to avoid NSAIDs (no ibuprofen, Motrin, Advil, Aleve, naproxen). Recommend to see urology for follow-up given your history of kidney stones. Continue close follow-up with your primary care doctor. I would like to see you back in the office in 1 year with repeat labs      HPI: Joseph Bruce is a 80 y.o. male who is here for Follow-up and Chronic Kidney Disease  . Last time seen was in October 2022, today he returns for year follow-up. Since our last visit, no ER visit or hospitalizations    Currently feeling okay, no complaints other than hearing problems. Denies any chest pain, no shortness of breath, no leg edema. Denies any abdominal pain, no nausea, no vomiting, no recent diarrhea or constipation  Denies any urinary problems, no burning sensation, no gross hematuria. Reports around 08/2023 had episode of pain and inability to void for 24 hours, finally passed a stone and symptoms resolved  Appetite is stable. Denies taking any NSAIDs. Denies tobacco use. He gained 2 lbs since last office visit. Most recent blood work was done on 10/10/2023, which we have reviewed together.  Cr 1.68, eGFR 40, Hgb 11.2, normal electrolytes, PTH 29, UPC 1.04        ROS: All the systems were reviewed and were negative except as documented on the H&P. Allergies: Metformin, Haemophilus influenzae, and Influenza virus vaccine    Medications:   Current Outpatient Medications:     amLODIPine (NORVASC) 5 mg tablet, TAKE 1 TABLET (5 MG TOTAL) BY MOUTH DAILY. , Disp: 90 tablet, Rfl: 1    atorvastatin (LIPITOR) 40 mg tablet, TAKE 1 TABLET BY MOUTH EVERY DAY, Disp: 90 tablet, Rfl: 1    Continuous Blood Gluc  (FreeStyle Iker 2 [place]) ALEJANDRINA, Use as instructed., Disp: 1 each, Rfl: 1    Continuous Blood Gluc Sensor (FreeStyle Iker 2 Sensor) MISC, USE AS INSTRUCTED, Disp: 1 each, Rfl: 5    insulin aspart (NovoLOG) 100 Units/mL injection pen, Novolog Flexpen U-100 Insulin  2-8 units daily, Disp: , Rfl:     metFORMIN (GLUCOPHAGE-XR) 500 mg 24 hr tablet, TAKE 1 TABLET BY MOUTH TWICE A DAY WITH MEALS, Disp: 180 tablet, Rfl: 1    metoprolol tartrate (LOPRESSOR) 50 mg tablet, TAKE 1 TABLET BY MOUTH 2 (TWO) TIMES A DAY. INCREASE METOPROLOL TARTRATE 50 MG TO TWICE A DAY, Disp: , Rfl:     Misc.  Devices (ROLLATOR ULTRA-LIGHT) MISC, by Does not apply route daily, Disp: 1 each, Rfl: 0    NOVOLIN N 100 UNIT/ML subcutaneous injection, 12 units in the morning and 22 units in the evening (Patient taking differently: 30 units in the morning and 30 units in the evening), Disp: 10 mL, Rfl: 0    warfarin (COUMADIN) 5 mg tablet, Take 5 mg by mouth daily , Disp: , Rfl:     ammonium lactate (LAC-HYDRIN) 12 % lotion, APPLY TO AFFECTED AREA OF SKIN UP TO TWICE DAILY AS NEEDED WITH FLARES (Patient not taking: Reported on 10/17/2023), Disp: , Rfl:     cyanocobalamin 1000 MCG tablet, Take 1 tablet by mouth daily (Patient not taking: Reported on 10/17/2023), Disp: 30 tablet, Rfl: 3    Past Medical History:   Diagnosis Date    Arthritis     Carotid stenosis     Coronary artery disease     Last Assessed:  11/5/13    COVID-19 5/3/2022    CVA (cerebral vascular accident) [facility])     Last Assessed:  1/10/18    Diabetes mellitus ([address]) Controlled with kidney complication, Last Assessed:  1/10/18    Hematuria     Hyperlipidemia     Hypertension     Kidney stone     Renal disorder     Trigger finger     Vitamin D deficiency      Past Surgical History:   Procedure Laterality Date    CARDIAC SURGERY      CIRCUMCISION      Resolved:  1964, Elective    COLONOSCOPY      Resolved:  2006    CORONARY ARTERY BYPASS GRAFT  2000 6 vesels    CYSTOSCOPY      Resolved:  4/9/10, Diagnostic    CYSTOSCOPY  12/14/2018    Dr. Nelli Chávez (ARTHROGRAM)  7/31/2019    FRACTURE SURGERY      left forearm fx with plate repair. HUMERUS FRACTURE SURGERY      Open, Greater tuberosity    LITHOTRIPSY  09/12/2013    Renal    NEPHROSTOMY Bilateral     With drainage    VASECTOMY       Family History   Problem Relation Age of Onset    Heart attack Mother     Coronary artery disease Mother     Emphysema Father     Cancer Brother         Leukemia    Hypertension Son     Diabetes Maternal Grandfather     Hypertension Other     Other Other         Cerebrovascular accident (CVA)      reports that he has quit smoking. His smoking use included cigarettes. He has a 60.00 pack-year smoking history. He has never used smokeless tobacco. He reports that he does not drink alcohol and does not use drugs. Physical Exam:   Vitals:    10/17/23 1257   BP: 128/60   BP Location: Left arm   Patient Position: Sitting   Cuff Size: Adult   Pulse: 70   SpO2: 96%   Weight: 88.9 kg (196 lb)   Height: 5' 3" (1.6 m)     Body mass index is 34.72 kg/m².     General: conscious, cooperative, in not acute distress  Eyes: conjunctivae pink, anicteric sclerae  ENT: lips and mucous membranes moist  Neck: supple, no JVD  Chest: clear breath sounds bilateral, no crackles, ronchus or wheezings  CVS: distinct S1 & S2, normal rate, regular rhythm  Abdomen: obese, non-tender, non-distended, normoactive bowel sounds  Back: no CVA tenderness  Extremities: mild edema of both legs  Skin: no rash  Neuro: awake, alert, oriented          Laboratory Results:  Lab Results   Component Value Date    WBC 11.20 10/10/2023    HGB 15.1 10/10/2023    HCT 45.5 10/10/2023    MCV 83 11/06/2019     10/10/2023     Lab Results   Component Value Date    SODIUM 144 10/10/2023    K 4.7 10/10/2023     10/10/2023    CO2 29 10/10/2023    BUN 26 10/10/2023    CREATININE 1.68 10/10/2023    GLUC 124 10/10/2023    CALCIUM 9.7 10/10/2023     Lab Results   Component Value Date    PTH 29.9 10/10/2023    CALCIUM 9.7 10/10/2023    PHOS 2.9 10/10/2023               Portions of the record may have been created with voice recognition software. Occasional wrong word or "sound a like" substitutions may have occurred due to the inherent limitations of voice recognition software. Read the chart carefully and recognize, using context, where substitutions have occurred. If you have any questions, please contact the dictating provider.

## 2023-11-18 DIAGNOSIS — I10 BENIGN ESSENTIAL HYPERTENSION: ICD-10-CM

## 2023-11-18 RX ORDER — AMLODIPINE BESYLATE 5 MG/1
5 TABLET ORAL DAILY
Qty: 90 TABLET | Refills: 1 | Status: SHIPPED | OUTPATIENT
Start: 2023-11-18

## 2023-12-19 ENCOUNTER — OFFICE VISIT (OUTPATIENT)
Dept: UROLOGY | Facility: AMBULATORY SURGERY CENTER | Age: 83
End: 2023-12-19
Payer: COMMERCIAL

## 2023-12-19 VITALS
HEART RATE: 64 BPM | WEIGHT: 189 LBS | OXYGEN SATURATION: 97 % | BODY MASS INDEX: 33.48 KG/M2 | DIASTOLIC BLOOD PRESSURE: 62 MMHG | SYSTOLIC BLOOD PRESSURE: 138 MMHG

## 2023-12-19 DIAGNOSIS — N20.0 CALCULUS OF KIDNEY: ICD-10-CM

## 2023-12-19 DIAGNOSIS — N40.1 BENIGN PROSTATIC HYPERPLASIA WITH URINARY FREQUENCY: Primary | ICD-10-CM

## 2023-12-19 DIAGNOSIS — R35.0 BENIGN PROSTATIC HYPERPLASIA WITH URINARY FREQUENCY: Primary | ICD-10-CM

## 2023-12-19 PROCEDURE — 99203 OFFICE O/P NEW LOW 30 MIN: CPT | Performed by: PHYSICIAN ASSISTANT

## 2023-12-19 NOTE — PROGRESS NOTES
UROLOGY CONSULTATION NOTE     Patient Identifiers: Cirilo Bustamante (MRN: [id number])  Service Requesting Consultation:   Service Providing Consultation:  Urology, Derek Alvarez PA-C  ConsultsReferral Self    Date of Service: 12/19/2023    Reason for Consultation: BPH and kidney stone follow-up    History of Present Illness:     Cirilo Bustamante is a 83 y.o. male not seen since 2019 with history of BPH kidney stones and microhematuria.  He has an extensive history of kidney stones and has been asymptomatic recently.  A few months ago he had difficulty urinating for about a day and a half.  This seemed to have self resolved without an appointment.  He comes in just for a checkup.  No recent imaging.  He feels everything is working fine and does not want any further evaluation.  Past Medical, Past Surgical History:     Past Medical History:   Diagnosis Date    Arthritis     Carotid stenosis     Coronary artery disease     Last Assessed:  11/5/13    COVID-19 5/3/2022    CVA (cerebral vascular accident) ([facility])     Last Assessed:  1/10/18    Diabetes mellitus (HCC)     Controlled with kidney complication, Last Assessed:  1/10/18    Hematuria     Hyperlipidemia     Hypertension     Kidney stone     Renal disorder     Trigger finger     Vitamin D deficiency    :    Past Surgical History:   Procedure Laterality Date    CARDIAC SURGERY      CIRCUMCISION      Resolved:  1964, Elective    COLONOSCOPY      Resolved:  2006    CORONARY ARTERY BYPASS GRAFT  2000    6 vesels    CYSTOSCOPY      Resolved:  4/9/10, Diagnostic    CYSTOSCOPY  12/14/2018    Dr. Kang Amaro    ELBOW SURGERY      FL INJECTION LEFT SHOULDER (ARTHROGRAM)  7/31/2019    FRACTURE SURGERY      left forearm fx with plate repair.    HUMERUS FRACTURE SURGERY      Open, Greater tuberosity    LITHOTRIPSY  09/12/2013    Renal    NEPHROSTOMY Bilateral     With drainage    VASECTOMY     :    Medications, Allergies:     Current Outpatient Medications:      Nutrition Care Plan    Nutrition Diagnosis:   Inadequate energy intake related to difficulty eating with C-Collar in place as evidenced by patient consuming 50-75% of meals, frequently consuming soups, beverages for ease.     Intervention:  General/healthful diet:   Regular Diet.      Commercial beverage:   RD implementing glucose intolerance oral nutrition supplement twice daily with meals per patient agreement. Each offers 220 kcals, 10 g protein    Monitoring and Evaluation:  Amount of food:   Goal: Consume >/=75% of meals, oral nutrition supplements.   - Patient consuming 50-75% of most meals since admission.        amLODIPine (NORVASC) 5 mg tablet, TAKE 1 TABLET (5 MG TOTAL) BY MOUTH DAILY., Disp: 90 tablet, Rfl: 1    atorvastatin (LIPITOR) 40 mg tablet, TAKE 1 TABLET BY MOUTH EVERY DAY, Disp: 90 tablet, Rfl: 1    Continuous Blood Gluc  (FreeStyle Iker 2 [place]) ALEJANDRINA, Use as instructed., Disp: 1 each, Rfl: 1    Continuous Blood Gluc Sensor (FreeStyle Iker 2 Sensor) MISC, USE AS INSTRUCTED, Disp: 1 each, Rfl: 5    cyanocobalamin 1000 MCG tablet, Take 1 tablet by mouth daily, Disp: 30 tablet, Rfl: 3    insulin aspart (NovoLOG) 100 Units/mL injection pen, Novolog Flexpen U-100 Insulin  2-8 units daily, Disp: , Rfl:     metFORMIN (GLUCOPHAGE-XR) 500 mg 24 hr tablet, TAKE 1 TABLET BY MOUTH TWICE A DAY WITH MEALS, Disp: 180 tablet, Rfl: 1    metoprolol tartrate (LOPRESSOR) 50 mg tablet, TAKE 1 TABLET BY MOUTH 2 (TWO) TIMES A DAY. INCREASE METOPROLOL TARTRATE 50 MG TO TWICE A DAY, Disp: , Rfl:     Misc. Devices (ROLLATOR ULTRA-LIGHT) MISC, by Does not apply route daily, Disp: 1 each, Rfl: 0    NOVOLIN N 100 UNIT/ML subcutaneous injection, 12 units in the morning and 22 units in the evening (Patient taking differently: 30 units in the morning and 30 units in the evening), Disp: 10 mL, Rfl: 0    warfarin (COUMADIN) 5 mg tablet, Take 5 mg by mouth daily 2.5 mg Tues & Wed 5 mg Mon, Thurs, Fri, Sat, Sun, Disp: , Rfl:     ammonium lactate (LAC-HYDRIN) 12 % lotion, APPLY TO AFFECTED AREA OF SKIN UP TO TWICE DAILY AS NEEDED WITH FLARES (Patient not taking: Reported on 10/17/2023), Disp: , Rfl:     Allergies:  Allergies   Allergen Reactions    Metformin GI Intolerance    Haemophilus Influenzae Rash    Influenza Virus Vaccine Rash   :    Social and Family History:   Social History:   Social History     Tobacco Use    Smoking status: Former     Current packs/day: 1.50     Average packs/day: 1.5 packs/day for 40.0 years (60.0 ttl pk-yrs)     Types: Cigarettes    Smokeless tobacco: Never    Tobacco comments:     quit 30 years ago.    Substance Use Topics    Alcohol use: No    Drug use: No   .    Social History     Tobacco Use   Smoking Status Former    Current packs/day: 1.50    Average packs/day: 1.5 packs/day for 40.0 years (60.0 ttl pk-yrs)    Types: Cigarettes   Smokeless Tobacco Never   Tobacco Comments    quit 30 years ago.       Family History:  Family History   Problem Relation Age of Onset    Heart attack Mother     Coronary artery disease Mother     Emphysema Father     Cancer Brother         Leukemia    Hypertension Son     Diabetes Maternal Grandfather     Hypertension Other     Other Other         Cerebrovascular accident (CVA)   :     Review of Systems:     General: Fever, chills, or night sweats: negative  Cardiac: Negative for chest pain.    Pulmonary: Negative for shortness of breath.  Gastrointestinal: Abdominal pain negative.  Nausea, vomiting, or diarrhea negative,  Genitourinary: See HPI above.  Patient does not have hematuria.  All other systems queried were negative.    Physical Exam:   General: Patient is pleasant and in NAD. Awake and alert  /62 (BP Location: Left arm, Patient Position: Sitting, Cuff Size: Adult)   Pulse 64   Wt 85.7 kg (189 lb)   SpO2 97%   BMI 33.48 kg/m²   HEENT:  Conjunctiva are clear  Constitutional:  pleasant and cooperative     no apparent distress  Cardiac: Peripheral edema: negative  Pulmonary: Non-labored breathing  Abdomen: Soft, non-tender, non-distended.  No surgical scars.  No masses, tenderness, hernias noted.    Genitourinary: Negative CVA tenderness, negative suprapubic tenderness.  Extremities:  Moves all extremities  Neurological:CNII-XII intact. No numbness or tingling. Essentially non focal neurologic exam  Psychiatric:mood affect and behavior normal    (Male): Penis circumcised, phallus normal, meatus patent.  Testicles descended into scrotum bilaterally without masses nor tenderness.  No inguinal hernias bilaterally.  MALINI: Prostate is not enlarged at 30 grams. The  prostate is not boggy. The prostate is not tender.  No nodules noted.      Labs:     Lab Results   Component Value Date    HGB 15.1 10/10/2023    HCT 45.5 10/10/2023    WBC 11.20 10/10/2023     10/10/2023   ]    Lab Results   Component Value Date     12/31/2015    K 4.7 10/10/2023     10/10/2023    CO2 29 10/10/2023    BUN 26 10/10/2023    CREATININE 1.68 10/10/2023    CALCIUM 9.7 10/10/2023    GLUCOSE 156 (H) 12/31/2015   ]    Imaging:   I personally reviewed the images and report of the following studies, and reviewed them with the patient:  None      ASSESSMENT:     #1.  BPH  #2.  Nephrolithiasis    PLAN:   -He wishes to follow-up in 1 year for an annual exam      Thank you for allowing me to participate in this patients’ care.  Please do not hesitate to call with any additional questions.  Derek Alvarez PA-C

## 2023-12-22 DIAGNOSIS — Z79.4 TYPE 2 DIABETES MELLITUS WITH DIABETIC POLYNEUROPATHY, WITH LONG-TERM CURRENT USE OF INSULIN (HCC): ICD-10-CM

## 2023-12-22 DIAGNOSIS — E11.42 TYPE 2 DIABETES MELLITUS WITH DIABETIC POLYNEUROPATHY, WITH LONG-TERM CURRENT USE OF INSULIN (HCC): ICD-10-CM

## 2024-01-15 NOTE — TELEPHONE ENCOUNTER
Insurance called to let us know the resolution for the prior auth was denied  Additional clinical information needed  Insurance will be faxing over the determination  No

## 2024-02-24 DIAGNOSIS — E11.42 TYPE 2 DIABETES MELLITUS WITH DIABETIC POLYNEUROPATHY, WITH LONG-TERM CURRENT USE OF INSULIN (HCC): ICD-10-CM

## 2024-02-24 DIAGNOSIS — Z79.4 TYPE 2 DIABETES MELLITUS WITH DIABETIC POLYNEUROPATHY, WITH LONG-TERM CURRENT USE OF INSULIN (HCC): ICD-10-CM

## 2024-02-26 RX ORDER — METFORMIN HYDROCHLORIDE 500 MG/1
500 TABLET, EXTENDED RELEASE ORAL 2 TIMES DAILY WITH MEALS
Qty: 180 TABLET | Refills: 1 | Status: SHIPPED | OUTPATIENT
Start: 2024-02-26

## 2024-03-17 DIAGNOSIS — Z79.4 TYPE 2 DIABETES MELLITUS WITH DIABETIC POLYNEUROPATHY, WITH LONG-TERM CURRENT USE OF INSULIN (HCC): ICD-10-CM

## 2024-03-17 DIAGNOSIS — E11.42 TYPE 2 DIABETES MELLITUS WITH DIABETIC POLYNEUROPATHY, WITH LONG-TERM CURRENT USE OF INSULIN (HCC): ICD-10-CM

## 2024-04-19 DIAGNOSIS — E78.2 MIXED HYPERLIPIDEMIA: ICD-10-CM

## 2024-04-19 RX ORDER — ATORVASTATIN CALCIUM 40 MG/1
TABLET, FILM COATED ORAL
Qty: 90 TABLET | Refills: 1 | Status: SHIPPED | OUTPATIENT
Start: 2024-04-19

## 2024-05-10 ENCOUNTER — TELEPHONE (OUTPATIENT)
Dept: GASTROENTEROLOGY | Facility: CLINIC | Age: 84
End: 2024-05-10

## 2024-05-10 NOTE — TELEPHONE ENCOUNTER
Pt called into med refill line stating a fax is going to be sent to office from [employer] for approval of 90 supply of Irais 2. Pt would like a call once fax is completed.

## 2024-05-13 DIAGNOSIS — E11.42 TYPE 2 DIABETES MELLITUS WITH DIABETIC POLYNEUROPATHY, WITH LONG-TERM CURRENT USE OF INSULIN (HCC): ICD-10-CM

## 2024-05-13 DIAGNOSIS — Z79.4 TYPE 2 DIABETES MELLITUS WITH DIABETIC POLYNEUROPATHY, WITH LONG-TERM CURRENT USE OF INSULIN (HCC): ICD-10-CM

## 2024-05-13 NOTE — TELEPHONE ENCOUNTER
Request received via [employer]. Printed and placed in providers bin. Request is for Freestyle Iker 2 sensor kit to be sent to [employer] [facility].

## 2024-07-02 ENCOUNTER — TELEPHONE (OUTPATIENT)
Age: 84
End: 2024-07-02

## 2024-07-02 ENCOUNTER — TELEPHONE (OUTPATIENT)
Dept: NEPHROLOGY | Facility: CLINIC | Age: 84
End: 2024-07-02

## 2024-07-02 NOTE — TELEPHONE ENCOUNTER
Left message for patient to call to schedule follow up appointment (recall list). This is the first attempt.

## 2024-07-02 NOTE — TELEPHONE ENCOUNTER
Pt is requesting appt information to be sent via mail to his home. Please send appt information to pt's home. Thank you!

## 2024-07-10 ENCOUNTER — TELEPHONE (OUTPATIENT)
Age: 84
End: 2024-07-10

## 2024-07-10 NOTE — TELEPHONE ENCOUNTER
Yeni called in because she found a pressure wound on Cirilo. She would like guidance on what to do next. She was thinking that she might have to take him to see a wound doctor but is not sure. We schedule an appointment for tomorrow 7/11/24. Please call [phone]

## 2024-07-11 ENCOUNTER — TELEPHONE (OUTPATIENT)
Age: 84
End: 2024-07-11

## 2024-07-11 NOTE — TELEPHONE ENCOUNTER
Caller: Jeferson    Doctor: Rodney    Reason for call: patient is confused he has 2 appt's scheduled with Dr. Stevens 1 is Melvin and the other Corwin baugh. He is going to reach out to his daughter Travis and have her call to cancel one of the appts.     Call back#: [phone]

## 2024-07-18 DIAGNOSIS — I10 BENIGN ESSENTIAL HYPERTENSION: ICD-10-CM

## 2024-07-18 RX ORDER — AMLODIPINE BESYLATE 5 MG/1
5 TABLET ORAL DAILY
Qty: 100 TABLET | Refills: 1 | Status: SHIPPED | OUTPATIENT
Start: 2024-07-18

## 2024-07-19 DIAGNOSIS — E11.42 TYPE 2 DIABETES MELLITUS WITH DIABETIC POLYNEUROPATHY, WITH LONG-TERM CURRENT USE OF INSULIN (HCC): ICD-10-CM

## 2024-07-19 DIAGNOSIS — Z79.4 TYPE 2 DIABETES MELLITUS WITH DIABETIC POLYNEUROPATHY, WITH LONG-TERM CURRENT USE OF INSULIN (HCC): ICD-10-CM

## 2024-07-20 RX ORDER — METFORMIN HYDROCHLORIDE 500 MG/1
500 TABLET, EXTENDED RELEASE ORAL 2 TIMES DAILY WITH MEALS
Qty: 180 TABLET | Refills: 1 | Status: SHIPPED | OUTPATIENT
Start: 2024-07-20

## 2024-07-26 ENCOUNTER — OFFICE VISIT (OUTPATIENT)
Dept: OBGYN CLINIC | Facility: CLINIC | Age: 84
End: 2024-07-26
Payer: COMMERCIAL

## 2024-07-26 ENCOUNTER — APPOINTMENT (OUTPATIENT)
Dept: RADIOLOGY | Facility: CLINIC | Age: 84
End: 2024-07-26
Payer: COMMERCIAL

## 2024-07-26 VITALS
WEIGHT: 184 LBS | BODY MASS INDEX: 32.6 KG/M2 | HEIGHT: 63 IN | HEART RATE: 84 BPM | DIASTOLIC BLOOD PRESSURE: 78 MMHG | SYSTOLIC BLOOD PRESSURE: 168 MMHG

## 2024-07-26 DIAGNOSIS — M25.551 RIGHT HIP PAIN: ICD-10-CM

## 2024-07-26 DIAGNOSIS — M25.552 LEFT HIP PAIN: ICD-10-CM

## 2024-07-26 DIAGNOSIS — M16.12 PRIMARY OSTEOARTHRITIS OF ONE HIP, LEFT: Primary | ICD-10-CM

## 2024-07-26 PROCEDURE — 99204 OFFICE O/P NEW MOD 45 MIN: CPT | Performed by: STUDENT IN AN ORGANIZED HEALTH CARE EDUCATION/TRAINING PROGRAM

## 2024-07-26 PROCEDURE — 73502 X-RAY EXAM HIP UNI 2-3 VIEWS: CPT

## 2024-07-26 NOTE — PROGRESS NOTES
Hip New Office Note    Assessment:     1. Primary osteoarthritis of one hip, left    2. Right hip pain    3. Left hip pain        Plan:     Problem List Items Addressed This Visit    None  Visit Diagnoses       Primary osteoarthritis of one hip, left    -  Primary    Right hip pain        Left hip pain        Relevant Orders    XR hip/pelv 2-3 vws left if performed    FL spine and pain procedure           82 y/o male with left hip and thigh pain secondary to OA.  Xrays of the left hip reviewed today showing arthritic changes with decreased joint space.  On physical exam he has significant limited range of motion without pain. He does have some tenderness to palpation over the left low back.  We discussed that he has arthritic changes of both the left hip and his lumbar spine.  We reviewed that the treatment of choice at this time is an IA cortisone injection into the left hip for both diagnostic and therapeutic purposes.  Based on his response to the steroid injection, we will be able to determine if his pain is more from his back or his hip.  Referral to spine and pain placed today for left hip IA cortisone injection. Advised to keep pain diary.  Follow up after cortisone injection.    Subjective:     Patient ID: Cirilo Bustamante is a 83 y.o. male.  Chief Complaint:  HPI:  83 y.o. male who presents today for an evaluation of his Left hip.  He states that he has been having pain in the left thigh for several years.  He states that it starts in the area of the groin and into the thigh.  He also notes that he has left sided low back pain occasionally.  He takes tylenol arthritis for his pain.  He is unable to take NSAIDs due to coumadin.  He states that his pain is worst with activity.  He ambulates with a rolling walker and an electric wheelchair at home.  He has not had any other treatment of his hip.      Allergy:  Allergies   Allergen Reactions    Metformin GI Intolerance    Haemophilus Influenzae Rash     Influenza Virus Vaccine Rash     Medications:  all current active meds have been reviewed  Past Medical History:  Past Medical History:   Diagnosis Date    Arthritis     Carotid stenosis     Coronary artery disease     Last Assessed:  11/5/13    COVID-19 5/3/2022    CVA (cerebral vascular accident) (HCC)     Last Assessed:  1/10/18    Diabetes mellitus (HCC)     Controlled with kidney complication, Last Assessed:  1/10/18    Hematuria     Hyperlipidemia     Hypertension     Kidney stone     Renal disorder     Trigger finger     Vitamin D deficiency      Past Surgical History:  Past Surgical History:   Procedure Laterality Date    CARDIAC SURGERY      CIRCUMCISION      Resolved:  1964, Elective    COLONOSCOPY      Resolved:  2006    CORONARY ARTERY BYPASS GRAFT  2000    6 vesels    CYSTOSCOPY      Resolved:  4/9/10, Diagnostic    CYSTOSCOPY  12/14/2018    Dr. Kang Amaro    ELBOW SURGERY      FL INJECTION LEFT SHOULDER (ARTHROGRAM)  7/31/2019    FRACTURE SURGERY      left forearm fx with plate repair.    HUMERUS FRACTURE SURGERY      Open, Greater tuberosity    LITHOTRIPSY  09/12/2013    Renal    NEPHROSTOMY Bilateral     With drainage    VASECTOMY       Family History:  Family History   Problem Relation Age of Onset    Heart attack Mother     Coronary artery disease Mother     Emphysema Father     Cancer Brother         Leukemia    Hypertension Son     Diabetes Maternal Grandfather     Hypertension Other     Other Other         Cerebrovascular accident (CVA)     Social History:  Social History     Substance and Sexual Activity   Alcohol Use No     Social History     Substance and Sexual Activity   Drug Use No     Social History     Tobacco Use   Smoking Status Former    Current packs/day: 1.50    Average packs/day: 1.5 packs/day for 40.0 years (60.0 ttl pk-yrs)    Types: Cigarettes   Smokeless Tobacco Never   Tobacco Comments    quit 30 years ago.           ROS:  General: Per HPI  Skin: Negative, except if  "noted below  HEENT: Negative  Respiratory: Negative  Cardiovascular: Negative  Gastrointestinal: Negative  Urinary: Negative  Vascular: Negative  Musculoskeletal: Positive per HPI   Neurologic: Positive per HPI  Endocrine: Negative    Objective:  BP Readings from Last 1 Encounters:   07/26/24 168/78      Wt Readings from Last 1 Encounters:   07/26/24 83.5 kg (184 lb)        Respiratory:   non-labored respirations    Lymphatics:  no palpable lymph nodes    Gait and Station:   Antalgic with walker    Neurologic:   Alert and oriented times 3  Patient with normal sensation except as noted below  Deep tendon reflexes 2+ except as noted in MSK exam    Bilateral Lower Extremity:  Left Hip     Inspection: skin intact    Range of Motion: limited, but wo pain    - log roll    - Trendelenburg sign  +TTP over left low back    Motor: 5/5 Q/HS/TA/GS/P    Pulses: 2+ DP / 2+ PT    SILT DP/SP/S/S/TN    Right Hip     Inspection: skin intact    Range of Motion: limited wo pain    - log roll    - Trendelenburg sign    Motor: 5/5 Q/HS/TA/GS/P    Pulses: 2+ DP / 2+ PT    SILT DP/SP/S/S/TN    Imaging:  My interpretation XR AP pelvis/ left hip: moderate-severe joint space narrowing, subchondral sclerosis, subchondral cysts, osteophyte formation. No fracture or dislocation.     BMI:   Estimated body mass index is 32.59 kg/m² as calculated from the following:    Height as of this encounter: 5' 3\" (1.6 m).    Weight as of this encounter: 83.5 kg (184 lb).  BSA:   Estimated body surface area is 1.87 meters squared as calculated from the following:    Height as of this encounter: 5' 3\" (1.6 m).    Weight as of this encounter: 83.5 kg (184 lb).           Scribe Attestation      I,:  Shilpi Perla PA-C am acting as a scribe while in the presence of the attending physician.:       I,:  Derek Stevens, DO personally performed the services described in this documentation    as scribed in my presence.:             "

## 2024-08-20 DIAGNOSIS — E11.42 TYPE 2 DIABETES MELLITUS WITH DIABETIC POLYNEUROPATHY, WITH LONG-TERM CURRENT USE OF INSULIN (HCC): ICD-10-CM

## 2024-08-20 DIAGNOSIS — Z79.4 TYPE 2 DIABETES MELLITUS WITH DIABETIC POLYNEUROPATHY, WITH LONG-TERM CURRENT USE OF INSULIN (HCC): ICD-10-CM

## 2024-08-21 ENCOUNTER — RA CDI HCC (OUTPATIENT)
Dept: OTHER | Facility: HOSPITAL | Age: 84
End: 2024-08-21

## 2024-08-21 NOTE — PROGRESS NOTES
HCC coding opportunities          Chart Reviewed number of suggestions sent to Provider: 6   E11.22  N18.32  E11.51  I73.9  I69.351- see 8/8/23 documentation  I49.5    Patients Insurance     Medicare Insurance: [employer]

## 2024-08-27 ENCOUNTER — OFFICE VISIT (OUTPATIENT)
Dept: FAMILY MEDICINE CLINIC | Facility: CLINIC | Age: 84
End: 2024-08-27
Payer: COMMERCIAL

## 2024-08-27 VITALS
WEIGHT: 194.2 LBS | HEIGHT: 63 IN | DIASTOLIC BLOOD PRESSURE: 68 MMHG | OXYGEN SATURATION: 95 % | SYSTOLIC BLOOD PRESSURE: 132 MMHG | TEMPERATURE: 98.2 F | RESPIRATION RATE: 18 BRPM | HEART RATE: 93 BPM | BODY MASS INDEX: 34.41 KG/M2

## 2024-08-27 DIAGNOSIS — R41.89 COGNITIVE DECLINE: ICD-10-CM

## 2024-08-27 DIAGNOSIS — I10 ESSENTIAL HYPERTENSION: ICD-10-CM

## 2024-08-27 DIAGNOSIS — E11.649 TYPE 2 DIABETES MELLITUS WITH HYPOGLYCEMIA WITHOUT COMA, WITH LONG-TERM CURRENT USE OF INSULIN (HCC): Primary | ICD-10-CM

## 2024-08-27 DIAGNOSIS — Z79.4 TYPE 2 DIABETES MELLITUS WITH DIABETIC POLYNEUROPATHY, WITH LONG-TERM CURRENT USE OF INSULIN (HCC): ICD-10-CM

## 2024-08-27 DIAGNOSIS — I77.9 BILATERAL CAROTID ARTERY DISEASE, UNSPECIFIED TYPE (HCC): ICD-10-CM

## 2024-08-27 DIAGNOSIS — E78.5 HYPERLIPIDEMIA, UNSPECIFIED HYPERLIPIDEMIA TYPE: ICD-10-CM

## 2024-08-27 DIAGNOSIS — Z00.00 MEDICARE ANNUAL WELLNESS VISIT, SUBSEQUENT: ICD-10-CM

## 2024-08-27 DIAGNOSIS — I69.351 HEMIPARESIS OF RIGHT DOMINANT SIDE AS LATE EFFECT OF CEREBRAL INFARCTION (HCC): ICD-10-CM

## 2024-08-27 DIAGNOSIS — N18.31 STAGE 3A CHRONIC KIDNEY DISEASE (HCC): ICD-10-CM

## 2024-08-27 DIAGNOSIS — I48.91 ATRIAL FIBRILLATION, UNSPECIFIED TYPE (HCC): ICD-10-CM

## 2024-08-27 DIAGNOSIS — E11.42 TYPE 2 DIABETES MELLITUS WITH DIABETIC POLYNEUROPATHY, WITH LONG-TERM CURRENT USE OF INSULIN (HCC): ICD-10-CM

## 2024-08-27 DIAGNOSIS — Z79.4 TYPE 2 DIABETES MELLITUS WITH HYPOGLYCEMIA WITHOUT COMA, WITH LONG-TERM CURRENT USE OF INSULIN (HCC): Primary | ICD-10-CM

## 2024-08-27 PROBLEM — G95.89 MYELOMALACIA (HCC): Status: RESOLVED | Noted: 2017-04-20 | Resolved: 2024-08-27

## 2024-08-27 LAB — SL AMB POCT HEMOGLOBIN AIC: 6.2 (ref ?–6.5)

## 2024-08-27 PROCEDURE — 83036 HEMOGLOBIN GLYCOSYLATED A1C: CPT | Performed by: FAMILY MEDICINE

## 2024-08-27 PROCEDURE — G0439 PPPS, SUBSEQ VISIT: HCPCS | Performed by: FAMILY MEDICINE

## 2024-08-27 PROCEDURE — 99214 OFFICE O/P EST MOD 30 MIN: CPT | Performed by: FAMILY MEDICINE

## 2024-08-27 RX ORDER — HUMAN INSULIN 100 [IU]/ML
INJECTION, SUSPENSION SUBCUTANEOUS
Start: 2024-08-27

## 2024-08-27 NOTE — ASSESSMENT & PLAN NOTE
Lab Results   Component Value Date    HGBA1C 6.2 08/27/2024     Had hypoglycemia twice last week glucose 50's and 60's. Advised pt to decrease novolin to 30u am and 25u pm. Continue metformin 500mg bid. Check blood sugar bid and as needed. Advised pt to call blood sugar log next week. Call office if hypoglycemia again.

## 2024-08-27 NOTE — PATIENT INSTRUCTIONS
Medicare Preventive Visit Patient Instructions  Thank you for completing your Welcome to Medicare Visit or Medicare Annual Wellness Visit today. Your next wellness visit will be due in one year (8/28/2025).  The screening/preventive services that you may require over the next 5-10 years are detailed below. Some tests may not apply to you based off risk factors and/or age. Screening tests ordered at today's visit but not completed yet may show as past due. Also, please note that scanned in results may not display below.  Preventive Screenings:  Service Recommendations Previous Testing/Comments   Colorectal Cancer Screening  Colonoscopy    Fecal Occult Blood Test (FOBT)/Fecal Immunochemical Test (FIT)  Fecal DNA/Cologuard Test  Flexible Sigmoidoscopy Age: 45-75 years old   Colonoscopy: every 10 years (May be performed more frequently if at higher risk)  OR  FOBT/FIT: every 1 year  OR  Cologuard: every 3 years  OR  Sigmoidoscopy: every 5 years  Screening may be recommended earlier than age 45 if at higher risk for colorectal cancer. Also, an individualized decision between you and your healthcare provider will decide whether screening between the ages of 76-85 would be appropriate. Colonoscopy: 09/08/2015  FOBT/FIT: Not on file  Cologuard: Not on file  Sigmoidoscopy: Not on file          Prostate Cancer Screening Individualized decision between patient and health care provider in men between ages of 55-69   Medicare will cover every 12 months beginning on the day after your 50th birthday PSA: No results in last 5 years           Hepatitis C Screening Once for adults born between 1945 and 1965  More frequently in patients at high risk for Hepatitis C Hep C Antibody: Not on file        Diabetes Screening 1-2 times per year if you're at risk for diabetes or have pre-diabetes Fasting glucose: 107 mg/dL (10/7/2019)  A1C: 6.8 % (4/27/2023)      Cholesterol Screening Once every 5 years if you don't have a lipid disorder. May  order more often based on risk factors. Lipid panel: 04/27/2023         Other Preventive Screenings Covered by Medicare:  Abdominal Aortic Aneurysm (AAA) Screening: covered once if your at risk. You're considered to be at risk if you have a family history of AAA or a male between the age of 65-75 who smoking at least 100 cigarettes in your lifetime.  Lung Cancer Screening: covers low dose CT scan once per year if you meet all of the following conditions: (1) Age 55-77; (2) No signs or symptoms of lung cancer; (3) Current smoker or have quit smoking within the last 15 years; (4) You have a tobacco smoking history of at least 20 pack years (packs per day x number of years you smoked); (5) You get a written order from a healthcare provider.  Glaucoma Screening: covered annually if you're considered high risk: (1) You have diabetes OR (2) Family history of glaucoma OR (3)  aged 50 and older OR (4)  American aged 65 and older  Osteoporosis Screening: covered every 2 years if you meet one of the following conditions: (1) Have a vertebral abnormality; (2) On glucocorticoid therapy for more than 3 months; (3) Have primary hyperparathyroidism; (4) On osteoporosis medications and need to assess response to drug therapy.  HIV Screening: covered annually if you're between the age of 15-65. Also covered annually if you are younger than 15 and older than 65 with risk factors for HIV infection. For pregnant patients, it is covered up to 3 times per pregnancy.    Immunizations:  Immunization Recommendations   Influenza Vaccine Annual influenza vaccination during flu season is recommended for all persons aged >= 6 months who do not have contraindications   Pneumococcal Vaccine   * Pneumococcal conjugate vaccine = PCV13 (Prevnar 13), PCV15 (Vaxneuvance), PCV20 (Prevnar 20)  * Pneumococcal polysaccharide vaccine = PPSV23 (Pneumovax) Adults 19-65 yo with certain risk factors or if 65+ yo  If never received any  pneumonia vaccine: recommend Prevnar 20 (PCV20)  Give PCV20 if previously received 1 dose of PCV13 or PPSV23   Hepatitis B Vaccine 3 dose series if at intermediate or high risk (ex: diabetes, end stage renal disease, liver disease)   Respiratory syncytial virus (RSV) Vaccine - COVERED BY MEDICARE PART D  * RSVPreF3 (Arexvy) CDC recommends that adults 60 years of age and older may receive a single dose of RSV vaccine using shared clinical decision-making (SCDM)   Tetanus (Td) Vaccine - COST NOT COVERED BY MEDICARE PART B Following completion of primary series, a booster dose should be given every 10 years to maintain immunity against tetanus. Td may also be given as tetanus wound prophylaxis.   Tdap Vaccine - COST NOT COVERED BY MEDICARE PART B Recommended at least once for all adults. For pregnant patients, recommended with each pregnancy.   Shingles Vaccine (Shingrix) - COST NOT COVERED BY MEDICARE PART B  2 shot series recommended in those 19 years and older who have or will have weakened immune systems or those 50 years and older     Health Maintenance Due:      Topic Date Due   • Colorectal Cancer Screening  09/08/2020     Immunizations Due:      Topic Date Due   • COVID-19 Vaccine (6 - 2023-24 season) 09/01/2023   • Influenza Vaccine (1) 09/01/2024     Advance Directives   What are advance directives?  Advance directives are legal documents that state your wishes and plans for medical care. These plans are made ahead of time in case you lose your ability to make decisions for yourself. Advance directives can apply to any medical decision, such as the treatments you want, and if you want to donate organs.   What are the types of advance directives?  There are many types of advance directives, and each state has rules about how to use them. You may choose a combination of any of the following:  Living will:  This is a written record of the treatment you want. You can also choose which treatments you do not want,  which to limit, and which to stop at a certain time. This includes surgery, medicine, IV fluid, and tube feedings.   Durable power of  for healthcare (DPAHC):  This is a written record that states who you want to make healthcare choices for you when you are unable to make them for yourself. This person, called a proxy, is usually a family member or a friend. You may choose more than 1 proxy.  Do not resuscitate (DNR) order:  A DNR order is used in case your heart stops beating or you stop breathing. It is a request not to have certain forms of treatment, such as CPR. A DNR order may be included in other types of advance directives.  Medical directive:  This covers the care that you want if you are in a coma, near death, or unable to make decisions for yourself. You can list the treatments you want for each condition. Treatment may include pain medicine, surgery, blood transfusions, dialysis, IV or tube feedings, and a ventilator (breathing machine).  Values history:  This document has questions about your views, beliefs, and how you feel and think about life. This information can help others choose the care that you would choose.  Why are advance directives important?  An advance directive helps you control your care. Although spoken wishes may be used, it is better to have your wishes written down. Spoken wishes can be misunderstood, or not followed. Treatments may be given even if you do not want them. An advance directive may make it easier for your family to make difficult choices about your care.   Weight Management   Why it is important to manage your weight:  Being overweight increases your risk of health conditions such as heart disease, high blood pressure, type 2 diabetes, and certain types of cancer. It can also increase your risk for osteoarthritis, sleep apnea, and other respiratory problems. Aim for a slow, steady weight loss. Even a small amount of weight loss can lower your risk of health  problems.  How to lose weight safely:  A safe and healthy way to lose weight is to eat fewer calories and get regular exercise. You can lose up about 1 pound a week by decreasing the number of calories you eat by 500 calories each day.   Healthy meal plan for weight management:  A healthy meal plan includes a variety of foods, contains fewer calories, and helps you stay healthy. A healthy meal plan includes the following:  Eat whole-grain foods more often.  A healthy meal plan should contain fiber. Fiber is the part of grains, fruits, and vegetables that is not broken down by your body. Whole-grain foods are healthy and provide extra fiber in your diet. Some examples of whole-grain foods are whole-wheat breads and pastas, oatmeal, brown rice, and bulgur.  Eat a variety of vegetables every day.  Include dark, leafy greens such as spinach, kale, chad greens, and mustard greens. Eat yellow and orange vegetables such as carrots, sweet potatoes, and winter squash.   Eat a variety of fruits every day.  Choose fresh or canned fruit (canned in its own juice or light syrup) instead of juice. Fruit juice has very little or no fiber.  Eat low-fat dairy foods.  Drink fat-free (skim) milk or 1% milk. Eat fat-free yogurt and low-fat cottage cheese. Try low-fat cheeses such as mozzarella and other reduced-fat cheeses.  Choose meat and other protein foods that are low in fat.  Choose beans or other legumes such as split peas or lentils. Choose fish, skinless poultry (chicken or turkey), or lean cuts of red meat (beef or pork). Before you cook meat or poultry, cut off any visible fat.   Use less fat and oil.  Try baking foods instead of frying them. Add less fat, such as margarine, sour cream, regular salad dressing and mayonnaise to foods. Eat fewer high-fat foods. Some examples of high-fat foods include french fries, doughnuts, ice cream, and cakes.  Eat fewer sweets.  Limit foods and drinks that are high in sugar. This  includes candy, cookies, regular soda, and sweetened drinks.  Exercise:  Exercise at least 30 minutes per day on most days of the week. Some examples of exercise include walking, biking, dancing, and swimming. You can also fit in more physical activity by taking the stairs instead of the elevator or parking farther away from stores. Ask your healthcare provider about the best exercise plan for you.      © Copyright [employer] 2018 Information is for End User's use only and may not be sold, redistributed or otherwise used for commercial purposes. All illustrations and images included in CareNotes® are the copyrighted property of [employer] Inc[employer] or [employer]

## 2024-08-27 NOTE — PROGRESS NOTES
Ambulatory Visit  Name: Cirilo Bustamante      : 1940      MRN: [id number]  Encounter Provider: Rick Delacruz MD  Encounter Date: 2024   Encounter department: [facility]    Chief Complaint   Patient presents with    Medicare Wellness Visit     Subsequent visit      Health Maintenance   Topic Date Due    SLP PLAN OF CARE  Never done    Zoster Vaccine (1 of 2) Never done    RSV Vaccine Age 60+ Years (1 - 1-dose 60+ series) Never done    Kidney Health Evaluation: Albumin/Creatinine Ratio  07/10/2020    Colorectal Cancer Screening  2020    Diabetic Foot Exam  2022    DM Eye Exam  2023    COVID-19 Vaccine (24 season) 2023    Medicare Annual Wellness Visit (AWV)  2024    Influenza Vaccine (1) 2024    Kidney Health Evaluation: GFR  10/10/2024    Fall Risk  2025    Depression Screening  2025    HEMOGLOBIN A1C  2025    Pneumococcal Vaccine: 65+ Years  Completed    RSV Vaccine age 0-20 Months  Aged Out    HIB Vaccine  Aged Out    IPV Vaccine  Aged Out    Hepatitis A Vaccine  Aged Out    Meningococcal ACWY Vaccine  Aged Out    HPV Vaccine  Aged Out       Assessment & Plan   1. Type 2 diabetes mellitus with hypoglycemia without coma, with long-term current use of insulin (HCC)  Assessment & Plan:    Lab Results   Component Value Date    HGBA1C 6.2 2024     Had hypoglycemia twice last week glucose 50's and 60's. Advised pt to decrease novolin to 30u am and 25u pm. Continue metformin 500mg bid. Check blood sugar bid and as needed. Advised pt to call blood sugar log next week. Call office if hypoglycemia again.   Orders:  -     NovoLIN N 100 UNIT/ML subcutaneous injection; 30 units in the morning and 25 units in the evening  2. Type 2 diabetes mellitus with diabetic polyneuropathy, with long-term current use of insulin (HCC)  -     POCT hemoglobin A1c  3. Essential hypertension  Assessment & Plan:  Continue amlodipine 5mg QD.  "  Orders:  -     Comprehensive metabolic panel; Future; Expected date: 08/27/2024  -     Lipid Panel with Direct LDL reflex; Future; Expected date: 08/27/2024  4. Hemiparesis of right dominant side as late effect of cerebral infarction (HCC)  5. Atrial fibrillation, unspecified type (HCC)  Assessment & Plan:  Continue metoprolol and coumadin.  FU cardiology.   6. Bilateral carotid artery disease, unspecified type (HCC)  7. Stage 3a chronic kidney disease (HCC)  Assessment & Plan:  Lab Results   Component Value Date    EGFR 40 (L) 10/10/2023    EGFR 40 10/10/2023    EGFR 48 (L) 09/01/2022    CREATININE 1.68 (H) 10/10/2023    CREATININE 1.68 10/10/2023    CREATININE 1.47 (H) 09/01/2022     FU nephrology.   8. Hyperlipidemia, unspecified hyperlipidemia type  Assessment & Plan:  Continue atorvastatin 40mg qhs.   Orders:  -     Comprehensive metabolic panel; Future; Expected date: 08/27/2024  -     Lipid Panel with Direct LDL reflex; Future; Expected date: 08/27/2024  9. Cognitive decline  Assessment & Plan:  Mini-cog 3/5 today.   10. Medicare annual wellness visit, subsequent      Depression Screening and Follow-up Plan: Patient was screened for depression during today's encounter. They screened negative with a PHQ-2 score of 0.      Refused flu shot because he is allergic to it.   Got PCV 13 in 2015 and pneumovax in 2016.   Got Covid19 vaccines and booster.   Fall precautions.   RTO in 3 months.      POA---daughter Yeni Clarke  Living will----DNR if \"brain dead\" per pt.          Preventive health issues were discussed with patient, and age appropriate screening tests were ordered as noted in patient's After Visit Summary. Personalized health advice and appropriate referrals for health education or preventive services given if needed, as noted in patient's After Visit Summary.    History of Present Illness     HPI     Pt is here by himself.       DM---Today hgA1C 6.2 in office.   He is on meformin 500mg bid. " Denies side effects.   He is on novolin 30u bid. Rarely used novolog.   He has FreeStyle sanjay. Had hypoglycemia last week 50-60. [facility]   Had foot neuropathy.   FU ophthalmology regularly.   FU podiatry. Had ingrown toenail last week.      HTN----He is on amlodipine 5mg QD and metoprolol 50mg QD. BP at home 130/70 per pt.   Hx of CVA in 3/2017. Left leg feels weakness. Use walker which helped.    Afib---He is on coumadin. Cardiology follows PT/INR monthly.    S/p packemaker 11/2017. [facility]U cardiology from [facility] regularly.      Hyperlipidemia---Pt is on atorvastatin 40mg qhs. Denies side effects. 4/2023 lipid panel good.     FU Urology yearly.      Lives by himself. Walks with walker. Does all ADL's. Still drive. has [employer].   Denies recent falls.   Denies depression.          Patient Care Team:  Rick Delacruz MD as PCP - General  MD Rick Grajeda MD Guillermo Carnero, MD    Review of Systems   Constitutional:  Negative for appetite change, chills and fever.   HENT:  Negative for congestion, ear pain, sinus pain and sore throat.    Eyes:  Negative for discharge and itching.   Respiratory:  Negative for apnea, cough, chest tightness, shortness of breath and wheezing.    Cardiovascular:  Negative for chest pain, palpitations and leg swelling.   Gastrointestinal:  Negative for abdominal pain, anal bleeding, constipation, diarrhea, nausea and vomiting.   Endocrine: Negative for cold intolerance, heat intolerance and polyuria.   Genitourinary:  Negative for difficulty urinating and dysuria.   Musculoskeletal:  Positive for gait problem. Negative for arthralgias, back pain and myalgias.   Skin:  Negative for rash.   Neurological:  Negative for dizziness and headaches.   Psychiatric/Behavioral:  Negative for agitation.      Medical History Reviewed by provider this encounter:  Problems  Med Hx  Surg Hx       Annual Wellness Visit Questionnaire   Cirilo is here for his Subsequent Wellness visit.      Health Risk Assessment:   Patient rates overall health as fair. Patient feels that their physical health rating is same. Patient is very satisfied with their life. Eyesight was rated as same. Hearing was rated as same. Patient feels that their emotional and mental health rating is same. Patients states they are never, rarely angry. Patient states they are never, rarely unusually tired/fatigued. Pain experienced in the last 7 days has been none. Patient states that he has experienced no weight loss or gain in last 6 months.     Depression Screening:   PHQ-2 Score: 0      Fall Risk Screening:   In the past year, patient has experienced: no history of falling in past year      Home Safety:  Patient does not have trouble with stairs inside or outside of their home. Patient has working smoke alarms and has working carbon monoxide detector. Home safety hazards include: none.     Nutrition:   Current diet is Regular and Limited junk food.     Medications:   Patient is currently taking over-the-counter supplements. OTC medications include: see medication list. Patient is able to manage medications.     Activities of Daily Living (ADLs)/Instrumental Activities of Daily Living (IADLs):   Walk and transfer into and out of bed and chair?: Yes  Dress and groom yourself?: Yes    Bathe or shower yourself?: Yes    Feed yourself? Yes  Do your laundry/housekeeping?: Yes  Manage your money, pay your bills and track your expenses?: Yes  Make your own meals?: Yes    Do your own shopping?: Yes    Previous Hospitalizations:   Any hospitalizations or ED visits within the last 12 months?: No      Advance Care Planning:   Living will: Yes    Durable POA for healthcare: Yes    Advanced directive: Yes    End of Life Decisions reviewed with patient: Yes    Provider agrees with end of life decisions: Yes      Cognitive Screening:   Provider or family/friend/caregiver concerned regarding cognition?: Yes    PREVENTIVE SCREENINGS       "Cardiovascular Screening:    General: History Lipid Disorder and Screening Current      Diabetes Screening:     General: History Diabetes and Screening Current      Colorectal Cancer Screening:     General: Screening Not Indicated      Prostate Cancer Screening:    General: Screening Not Indicated      Abdominal Aortic Aneurysm (AAA) Screening:    Risk factors include: tobacco use        Lung Cancer Screening:     General: Screening Not Indicated    Screening, Brief Intervention, and Referral to Treatment (SBIRT)    Screening  Typical number of drinks in a day: 0  Typical number of drinks in a week: 0  Interpretation: Low risk drinking behavior.    Single Item Drug Screening:  How often have you used an illegal drug (including marijuana) or a prescription medication for non-medical reasons in the past year? never    Single Item Drug Screen Score: 0  Interpretation: Negative screen for possible drug use disorder    Social Determinants of Health     Food Insecurity: No Food Insecurity (8/27/2024)    Hunger Vital Sign     Worried About Running Out of Food in the Last Year: Never true     Ran Out of Food in the Last Year: Never true   Transportation Needs: No Transportation Needs (8/27/2024)    PRAPARE - Transportation     Lack of Transportation (Medical): No     Lack of Transportation (Non-Medical): No   Housing Stability: Unknown (8/27/2024)    Housing Stability Vital Sign     Unable to Pay for Housing in the Last Year: No   Utilities: Not At Risk (8/27/2024)    [facility] Utilities     Threatened with loss of utilities: No     No results found.    Objective     /68   Pulse 93   Temp 98.2 °F (36.8 °C) (Temporal)   Resp 18   Ht 5' 3\" (1.6 m)   Wt 88.1 kg (194 lb 3.2 oz)   SpO2 95%   BMI 34.40 kg/m²     Physical Exam  Constitutional:       Appearance: He is well-developed.   HENT:      Head: Normocephalic and atraumatic.   Eyes:      General:         Right eye: No discharge.         Left eye: No discharge.      " Conjunctiva/sclera: Conjunctivae normal.   Cardiovascular:      Rate and Rhythm: Normal rate and regular rhythm.      Pulses: Pulses are weak.           Dorsalis pedis pulses are 1+ on the right side and 1+ on the left side.      Heart sounds: Normal heart sounds. No murmur heard.     No friction rub. No gallop.   Pulmonary:      Effort: Pulmonary effort is normal. No respiratory distress.      Breath sounds: Normal breath sounds. No wheezing or rales.   Abdominal:      General: Bowel sounds are normal. There is no distension.      Palpations: Abdomen is soft.      Tenderness: There is no abdominal tenderness. There is no guarding.   Musculoskeletal:      Cervical back: Normal range of motion and neck supple.        Feet:       Comments: Use walker   Feet:      Right foot:      Skin integrity: No ulcer, skin breakdown, erythema, warmth, callus or dry skin.      Left foot:      Skin integrity: No ulcer, skin breakdown, erythema, warmth, callus or dry skin.   Neurological:      Mental Status: He is alert.       Patient's shoes and socks removed.    Right Foot/Ankle   Right Foot Inspection  Skin Exam: skin normal and skin intact. No dry skin, no warmth, no callus, no erythema, no maceration, no abnormal color, no pre-ulcer, no ulcer and no callus.     Toe Exam: ROM and strength within normal limits and right toe deformity.     Sensory   Monofilament testing: absent    Vascular  The right DP pulse is 1+.     Left Foot/Ankle  Left Foot Inspection  Skin Exam: skin normal and skin intact. No dry skin, no warmth, no erythema, no maceration, normal color, no pre-ulcer, no ulcer and no callus.     Toe Exam: ROM and strength within normal limits and left toe deformity.     Sensory   Monofilament testing: absent    Vascular  The left DP pulse is 1+.     Assign Risk Category  Deformity present  Loss of protective sensation  Weak pulses  Risk: 2

## 2024-08-27 NOTE — ASSESSMENT & PLAN NOTE
Lab Results   Component Value Date    EGFR 40 (L) 10/10/2023    EGFR 40 10/10/2023    EGFR 48 (L) 09/01/2022    CREATININE 1.68 (H) 10/10/2023    CREATININE 1.68 10/10/2023    CREATININE 1.47 (H) 09/01/2022      nephrology.

## 2024-09-17 LAB
ALBUMIN SERPL-MCNC: 4 G/DL (ref 3.5–5.7)
ANION GAP SERPL CALCULATED.3IONS-SCNC: 11 MMOL/L (ref 3–11)
BUN SERPL-MCNC: 21 MG/DL (ref 7–28)
CALCIUM SERPL-MCNC: 9.6 MG/DL (ref 8.5–10.1)
CHLORIDE SERPL-SCNC: 106 MMOL/L (ref 100–109)
CO2 SERPL-SCNC: 28 MMOL/L (ref 21–31)
CREAT SERPL-MCNC: 1.4 MG/DL (ref 0.53–1.3)
CREAT UR-MCNC: 64.3 MG/DL (ref 50–200)
CYTOLOGY CMNT CVX/VAG CYTO-IMP: ABNORMAL
ERYTHROCYTE [DISTWIDTH] IN BLOOD BY AUTOMATED COUNT: 16.3 % (ref 12–16)
GFR/BSA.PRED SERPLBLD CYS-BASED-ARV: 50 ML/MIN/{1.73_M2}
GLUCOSE SERPL-MCNC: 128 MG/DL (ref 65–99)
HCT VFR BLD AUTO: 44.5 % (ref 37–48)
HGB BLD-MCNC: 14.8 G/DL (ref 12.5–17)
MCH RBC QN AUTO: 27.4 PG (ref 27–36)
MCHC RBC AUTO-ENTMCNC: 33.2 G/DL (ref 32–37)
MCV RBC AUTO: 82 FL (ref 80–100)
PHOSPHATE SERPL-MCNC: 2.8 MG/DL (ref 2.3–4.6)
PLATELET # BLD AUTO: 235 THOU/CMM (ref 140–350)
PMV BLD REES-ECKER: 9.1 FL (ref 7.5–11.3)
POTASSIUM SERPL-SCNC: 4.4 MMOL/L (ref 3.5–5.2)
PROT UR-MCNC: 71.3 MG/DL
PROT/CREAT UR: 1.11
RBC # BLD AUTO: 5.4 MILL/CMM (ref 4–5.4)
SODIUM SERPL-SCNC: 145 MMOL/L (ref 135–145)
WBC # BLD AUTO: 12.3 THOU/CMM (ref 4–10.5)

## 2024-09-18 LAB — PTH-INTACT SERPL-MCNC: 43 PG/ML (ref 12–88)

## 2024-10-01 ENCOUNTER — OFFICE VISIT (OUTPATIENT)
Dept: NEPHROLOGY | Facility: CLINIC | Age: 84
End: 2024-10-01
Payer: COMMERCIAL

## 2024-10-01 VITALS
BODY MASS INDEX: 34.73 KG/M2 | SYSTOLIC BLOOD PRESSURE: 143 MMHG | HEIGHT: 63 IN | WEIGHT: 196 LBS | HEART RATE: 79 BPM | DIASTOLIC BLOOD PRESSURE: 68 MMHG | OXYGEN SATURATION: 97 %

## 2024-10-01 DIAGNOSIS — N18.32 HYPERTENSIVE KIDNEY DISEASE WITH STAGE 3B CHRONIC KIDNEY DISEASE (HCC): ICD-10-CM

## 2024-10-01 DIAGNOSIS — I12.9 HYPERTENSIVE KIDNEY DISEASE WITH STAGE 3B CHRONIC KIDNEY DISEASE (HCC): ICD-10-CM

## 2024-10-01 DIAGNOSIS — N18.32 TYPE 2 DIABETES MELLITUS WITH STAGE 3B CHRONIC KIDNEY DISEASE, WITH LONG-TERM CURRENT USE OF INSULIN (HCC): Primary | ICD-10-CM

## 2024-10-01 DIAGNOSIS — N18.32 STAGE 3B CHRONIC KIDNEY DISEASE (HCC): ICD-10-CM

## 2024-10-01 DIAGNOSIS — Z95.0 S/P CARDIAC PACEMAKER PROCEDURE: ICD-10-CM

## 2024-10-01 DIAGNOSIS — E11.22 TYPE 2 DIABETES MELLITUS WITH STAGE 3B CHRONIC KIDNEY DISEASE, WITH LONG-TERM CURRENT USE OF INSULIN (HCC): Primary | ICD-10-CM

## 2024-10-01 DIAGNOSIS — I77.9 BILATERAL CAROTID ARTERY DISEASE, UNSPECIFIED TYPE (HCC): ICD-10-CM

## 2024-10-01 DIAGNOSIS — I49.5 TACHY-BRADY SYNDROME (HCC): ICD-10-CM

## 2024-10-01 DIAGNOSIS — Z79.4 TYPE 2 DIABETES MELLITUS WITH STAGE 3B CHRONIC KIDNEY DISEASE, WITH LONG-TERM CURRENT USE OF INSULIN (HCC): Primary | ICD-10-CM

## 2024-10-01 DIAGNOSIS — E78.5 HYPERLIPIDEMIA, UNSPECIFIED HYPERLIPIDEMIA TYPE: ICD-10-CM

## 2024-10-01 PROBLEM — E11.9 TYPE 2 DIABETES MELLITUS, WITH LONG-TERM CURRENT USE OF INSULIN (HCC): Status: ACTIVE | Noted: 2024-08-27

## 2024-10-01 PROCEDURE — G2211 COMPLEX E/M VISIT ADD ON: HCPCS | Performed by: INTERNAL MEDICINE

## 2024-10-01 PROCEDURE — 99214 OFFICE O/P EST MOD 30 MIN: CPT | Performed by: INTERNAL MEDICINE

## 2024-10-01 NOTE — PROGRESS NOTES
OFFICE FOLLOW UP - Nephrology   Cirilo Bustamante 83 y.o. male MRN: [id number]       ASSESSMENT and PLAN:  Cirilo was seen today for follow-up and chronic kidney disease.    Diagnoses and all orders for this visit:    Type 2 diabetes mellitus with stage 3b chronic kidney disease, with long-term current use of insulin (HCC)  -     CBC; Future  -     Renal function panel; Future  -     PTH, intact; Future  -     Protein / creatinine ratio, urine; Future    Hypertensive kidney disease with stage 3b chronic kidney disease (HCC)    Stage 3b chronic kidney disease (HCC)    Hyperlipidemia, unspecified hyperlipidemia type    Tachy-brice syndrome (HCC)    S/P cardiac pacemaker procedure    Bilateral carotid artery disease, unspecified type ([facility])        This is a 83-year-old gentleman with known history of chronic kidney disease stage 3 in the setting of diabetes, hypertension, previous episode of obstructive uropathy in 2013, hyperlipidemia who returns to the office for follow-up.      1. Chronic kidney disease stage 3, previous creatinine around 1.2-1.5.    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    Most recent blood and urine test were reviewed with patient, kidney function fairly stable with a creatinine of 1.4 and stable proteinuria with a UPC of 1.1.  Once again we discussed about importance to keep having good blood pressure as well as good diabetes control, avoid NSAIDs, follow low-salt diet.  I would like to see him back in the office in 1 year with repeat labs.  No changes on his medication at this moment    2. Insulin-dependent diabetes, continue management by his family doctor. Goal to have an A1c around 7-8% given age.  Recent hemoglobin A1c 6.2% on 8/2024    3. Hypertension, blood pressure improved after recheck, no changes on his medications at this moment.  Follow low-salt diet    4. History of  nephrolithiasis with episode of obstructive uropathy requiring bilateral percutaneous nephrostomy tubes in 2013.    Apparently passed a stone around 08/2023, continue to to follow a low-salt diet and drink at least 2 L of water daily.    Sinew follow-up with urology    5. Mineral bone disease, recent calcium, phosphorus, PTH acceptable, plan to follow labs in 1 year    6. Hemoglobin mild anemia, recent hemoglobin improved up to 14.8, follow CBC in 1 year    7.  Tachy-brice syndrome status post pacemaker placement, continue follow-up with cardiologist at [facility].    8. Hyperlipidemia, continue with statins          Patient Instructions   As we discussed in the office visit and after reviewing your most recent blood test your kidney function remains stable with a most recent creatinine of 1.4.  Keep working on having good blood pressure as well as good diabetes control.  Remember to avoid NSAIDs (no ibuprofen, Motrin, Advil, Aleve, naproxen).  Okay to take Tylenol or acetaminophen as needed for pain.  Continue close follow-up with primary care doctor, cardiologist, urologist.  I would like to see you back in the office in 1 year with repeat labs      HPI: Cirilo Bustamante is a 83 y.o. male who is here for Follow-up and Chronic Kidney Disease  .    Last time seen was 10/2023, today he returns for year follow-up.    Since our last visit, no ER visit or hospitalizations    Patient today in general is doing well, denies any complaints.  Reports only taking Tylenol for joint pains  Denies any chest pain, no shortness of breath, reports chronic leg swelling but is unchanged.  Denies any abdominal pain, no nausea, no vomiting, no recent diarrhea or constipation  Denies any urinary problems, no burning sensation, no gross hematuria.  Reports has not passed the any kidney stones since last time was seen Appetite is stable.   Denies taking any NSAIDs.  Denies tobacco use.  Weight unchanged since last office  visit    Most recent blood work was done on 9/17/2024, which we have reviewed together.  Serum creatinine 1.40, eGFR 50, Hgb 14.8, normal electrolytes, PTH 43, UPC 1.11      ROS: All the systems were reviewed and were negative except as documented on the H&P.        Allergies: Metformin, Haemophilus influenzae, and Influenza virus vaccine    Medications:   Current Outpatient Medications:     amLODIPine (NORVASC) 5 mg tablet, TAKE 1 TABLET (5 MG TOTAL) BY MOUTH DAILY., Disp: 100 tablet, Rfl: 1    atorvastatin (LIPITOR) 40 mg tablet, TAKE 1 TABLET BY MOUTH EVERY DAY, Disp: 90 tablet, Rfl: 1    Continuous Blood Gluc  (FreeStyle Iker 2 Bethel) ALEJANDRINA, Use as instructed., Disp: 1 each, Rfl: 1    Continuous Glucose Sensor (FreeStyle Iker 2 Sensor) MISC, USE AS INSTRUCTED, Disp: 2 each, Rfl: 2    cyanocobalamin 1000 MCG tablet, Take 1 tablet by mouth daily, Disp: 30 tablet, Rfl: 3    insulin aspart (NovoLOG) 100 Units/mL injection pen, Novolog Flexpen U-100 Insulin  2-8 units daily, Disp: , Rfl:     metFORMIN (GLUCOPHAGE-XR) 500 mg 24 hr tablet, TAKE 1 TABLET BY MOUTH TWICE A DAY WITH FOOD, Disp: 180 tablet, Rfl: 1    metoprolol tartrate (LOPRESSOR) 50 mg tablet, TAKE 1 TABLET BY MOUTH 2 (TWO) TIMES A DAY. INCREASE METOPROLOL TARTRATE 50 MG TO TWICE A DAY, Disp: , Rfl:     Misc. Devices (ROLLATOR ULTRA-LIGHT) MISC, by Does not apply route daily, Disp: 1 each, Rfl: 0    NovoLIN N 100 UNIT/ML subcutaneous injection, 30 units in the morning and 25 units in the evening, Disp: , Rfl:     warfarin (COUMADIN) 5 mg tablet, Take 5 mg by mouth daily 2.5 mg Tues & Wed 5 mg Mon, Thurs, Fri, Sat, Sun, Disp: , Rfl:     ammonium lactate (LAC-HYDRIN) 12 % lotion, APPLY TO AFFECTED AREA OF SKIN UP TO TWICE DAILY AS NEEDED WITH FLARES (Patient not taking: Reported on 10/17/2023), Disp: , Rfl:     Past Medical History:   Diagnosis Date    Arthritis     Carotid stenosis     Coronary artery disease     Last Assessed:  11/5/13     "COVID-19 5/3/2022    CVA (cerebral vascular accident) (HCC)     Last Assessed:  1/10/18    Diabetes mellitus (HCC)     Controlled with kidney complication, Last Assessed:  1/10/18    Hematuria     Hyperlipidemia     Hypertension     Kidney stone     Renal disorder     Trigger finger     Vitamin D deficiency      Past Surgical History:   Procedure Laterality Date    CARDIAC SURGERY      CIRCUMCISION      Resolved:  1964, Elective    COLONOSCOPY      Resolved:  2006    CORONARY ARTERY BYPASS GRAFT  2000    6 vesels    CYSTOSCOPY      Resolved:  4/9/10, Diagnostic    CYSTOSCOPY  12/14/2018    Dr. Kang Amaro    ELBOW SURGERY      FL INJECTION LEFT SHOULDER (ARTHROGRAM)  7/31/2019    FRACTURE SURGERY      left forearm fx with plate repair.    HUMERUS FRACTURE SURGERY      Open, Greater tuberosity    LITHOTRIPSY  09/12/2013    Renal    NEPHROSTOMY Bilateral     With drainage    VASECTOMY       Family History   Problem Relation Age of Onset    Heart attack Mother     Coronary artery disease Mother     Emphysema Father     Cancer Brother         Leukemia    Hypertension Son     Diabetes Maternal Grandfather     Hypertension Other     Other Other         Cerebrovascular accident (CVA)      reports that he has quit smoking. His smoking use included cigarettes. He has a 60 pack-year smoking history. He has been exposed to tobacco smoke. He has never used smokeless tobacco. He reports that he does not drink alcohol and does not use drugs.      Physical Exam:   Vitals:    10/01/24 1148 10/01/24 1208   BP: 148/66 143/68   BP Location: Left arm Right arm   Patient Position: Sitting Sitting   Cuff Size: Adult Standard   Pulse: 79    SpO2: 97%    Weight: 88.9 kg (196 lb)    Height: 5' 3\" (1.6 m)      Body mass index is 34.72 kg/m².    General: conscious, cooperative, in not acute distress  Eyes: conjunctivae pink, anicteric sclerae  ENT: lips and mucous membranes moist  Neck: supple, no JVD  Chest: clear breath sounds " "bilateral, no crackles, ronchus or wheezings  CVS: distinct S1 & S2, normal rate, regular rhythm  Abdomen: soft, non-tender, non-distended, normoactive bowel sounds  Back: no CVA tenderness  Extremities: mild edema of both legs  Skin: no rash  Neuro: awake, alert, oriented          Laboratory Results:  Lab Results   Component Value Date    WBC 12.3 (H) 09/17/2024    HGB 14.8 09/17/2024    HCT 44.5 09/17/2024    MCV 82 09/17/2024     09/17/2024     Lab Results   Component Value Date    SODIUM 145 09/17/2024    K 4.4 09/17/2024     09/17/2024    CO2 28 09/17/2024    BUN 21 09/17/2024    CREATININE 1.40 (H) 09/17/2024    GLUC 128 (H) 09/17/2024    CALCIUM 9.6 09/17/2024     Lab Results   Component Value Date    PTH 29.9 10/10/2023    CALCIUM 9.6 09/17/2024    PHOS 2.9 10/10/2023           Portions of the record may have been created with voice recognition software. Occasional wrong word or \"sound a like\" substitutions may have occurred due to the inherent limitations of voice recognition software. Read the chart carefully and recognize, using context, where substitutions have occurred.If you have any questions, please contact the dictating provider.  "

## 2024-10-01 NOTE — PATIENT INSTRUCTIONS
As we discussed in the office visit and after reviewing your most recent blood test your kidney function remains stable with a most recent creatinine of 1.4.  Keep working on having good blood pressure as well as good diabetes control.  Remember to avoid NSAIDs (no ibuprofen, Motrin, Advil, Aleve, naproxen).  Okay to take Tylenol or acetaminophen as needed for pain.  Continue close follow-up with primary care doctor, cardiologist, urologist.  I would like to see you back in the office in 1 year with repeat labs

## 2024-11-22 DIAGNOSIS — E78.2 MIXED HYPERLIPIDEMIA: ICD-10-CM

## 2024-11-22 RX ORDER — ATORVASTATIN CALCIUM 40 MG/1
40 TABLET, FILM COATED ORAL DAILY
Qty: 30 TABLET | Refills: 0 | Status: SHIPPED | OUTPATIENT
Start: 2024-11-22

## 2024-11-23 DIAGNOSIS — I10 BENIGN ESSENTIAL HYPERTENSION: ICD-10-CM

## 2024-11-25 ENCOUNTER — TELEPHONE (OUTPATIENT)
Age: 84
End: 2024-11-25

## 2024-11-25 RX ORDER — AMLODIPINE BESYLATE 5 MG/1
5 TABLET ORAL DAILY
Qty: 100 TABLET | Refills: 1 | Status: SHIPPED | OUTPATIENT
Start: 2024-11-25

## 2024-11-25 NOTE — TELEPHONE ENCOUNTER
Patient has appointment for 3 month follow up on 12/3 he would like to know if he needs any BW. Please place BW script and call him to let him know when the scripts ar in. Patient goes to [facility].    Thank you!!

## 2024-11-26 DIAGNOSIS — E78.5 HYPERLIPIDEMIA, UNSPECIFIED HYPERLIPIDEMIA TYPE: ICD-10-CM

## 2024-11-26 DIAGNOSIS — Z79.4 TYPE 2 DIABETES MELLITUS WITH STAGE 3B CHRONIC KIDNEY DISEASE, WITH LONG-TERM CURRENT USE OF INSULIN (HCC): Primary | ICD-10-CM

## 2024-11-26 DIAGNOSIS — I48.91 ATRIAL FIBRILLATION, UNSPECIFIED TYPE (HCC): ICD-10-CM

## 2024-11-26 DIAGNOSIS — N18.32 TYPE 2 DIABETES MELLITUS WITH STAGE 3B CHRONIC KIDNEY DISEASE, WITH LONG-TERM CURRENT USE OF INSULIN (HCC): Primary | ICD-10-CM

## 2024-11-26 DIAGNOSIS — E11.22 TYPE 2 DIABETES MELLITUS WITH STAGE 3B CHRONIC KIDNEY DISEASE, WITH LONG-TERM CURRENT USE OF INSULIN (HCC): Primary | ICD-10-CM

## 2024-11-27 ENCOUNTER — TELEPHONE (OUTPATIENT)
Dept: NEPHROLOGY | Facility: CLINIC | Age: 84
End: 2024-11-27

## 2024-11-27 ENCOUNTER — RA CDI HCC (OUTPATIENT)
Dept: OTHER | Facility: HOSPITAL | Age: 84
End: 2024-11-27

## 2024-11-27 NOTE — PROGRESS NOTES
HCC coding opportunities          Chart Reviewed number of suggestions sent to Provider: 2   I73.9  E11.51    Patients Insurance     Medicare Insurance: [employer]

## 2024-11-27 NOTE — TELEPHONE ENCOUNTER
Called patient and spoke with regarding a follow up with Dr. Moon from [facility].   I scheduled in [place], Wednesday 10/22/2025 at 2:00 Pm, made aware of labs. mailing  mailing an appointment card

## 2024-11-29 ENCOUNTER — TELEPHONE (OUTPATIENT)
Age: 84
End: 2024-11-29

## 2024-11-29 NOTE — TELEPHONE ENCOUNTER
[facility] called in to get the labs faxed over to them as patient is in the office with them.     Faxed to [phone]

## 2024-12-03 ENCOUNTER — OFFICE VISIT (OUTPATIENT)
Dept: FAMILY MEDICINE CLINIC | Facility: CLINIC | Age: 84
End: 2024-12-03
Payer: COMMERCIAL

## 2024-12-03 ENCOUNTER — TELEPHONE (OUTPATIENT)
Age: 84
End: 2024-12-03

## 2024-12-03 VITALS
WEIGHT: 198 LBS | OXYGEN SATURATION: 96 % | RESPIRATION RATE: 18 BRPM | SYSTOLIC BLOOD PRESSURE: 132 MMHG | TEMPERATURE: 98 F | HEIGHT: 63 IN | HEART RATE: 81 BPM | BODY MASS INDEX: 35.08 KG/M2 | DIASTOLIC BLOOD PRESSURE: 76 MMHG

## 2024-12-03 DIAGNOSIS — Z79.4 TYPE 2 DIABETES MELLITUS WITH DIABETIC POLYNEUROPATHY, WITH LONG-TERM CURRENT USE OF INSULIN (HCC): Primary | ICD-10-CM

## 2024-12-03 DIAGNOSIS — I10 PRIMARY HYPERTENSION: ICD-10-CM

## 2024-12-03 DIAGNOSIS — E11.42 TYPE 2 DIABETES MELLITUS WITH DIABETIC POLYNEUROPATHY, WITH LONG-TERM CURRENT USE OF INSULIN (HCC): Primary | ICD-10-CM

## 2024-12-03 DIAGNOSIS — E78.5 HYPERLIPIDEMIA, UNSPECIFIED HYPERLIPIDEMIA TYPE: ICD-10-CM

## 2024-12-03 PROCEDURE — 99214 OFFICE O/P EST MOD 30 MIN: CPT | Performed by: FAMILY MEDICINE

## 2024-12-03 PROCEDURE — G2211 COMPLEX E/M VISIT ADD ON: HCPCS | Performed by: FAMILY MEDICINE

## 2024-12-03 NOTE — ASSESSMENT & PLAN NOTE
Controlled. Continue amlodipine 5mg QD and metoprolol.   Orders:    CBC; Future    Comprehensive metabolic panel; Future    Hemoglobin A1C; Future    Lipid panel; Future

## 2024-12-03 NOTE — TELEPHONE ENCOUNTER
[facility] called requesting last INR result to be faxed.      Fax # [phone].    Faxed over.

## 2024-12-03 NOTE — PROGRESS NOTES
Name: Cirilo Bustamante      : 1940      MRN: [id number]  Encounter Provider: Rick Delacruz MD  Encounter Date: 12/3/2024   Encounter department: [facility]  :  Assessment & Plan  Type 2 diabetes mellitus with diabetic polyneuropathy, with long-term current use of insulin (HCC)    Lab Results   Component Value Date    HGBA1C 6.9 (H) 2024     Controlled. Low carb diet. Continue metformin 500mg bid and novolin 30u am/25u pm.   Orders:    CBC; Future    Comprehensive metabolic panel; Future    Hemoglobin A1C; Future    Lipid panel; Future    Hyperlipidemia, unspecified hyperlipidemia type  Low fat diet. Continue atorvastatin 40mg qhs.   Orders:    CBC; Future    Comprehensive metabolic panel; Future    Hemoglobin A1C; Future    Lipid panel; Future    Primary hypertension  Controlled. Continue amlodipine 5mg QD and metoprolol.   Orders:    CBC; Future    Comprehensive metabolic panel; Future    Hemoglobin A1C; Future    Lipid panel; Future      Reviewed lab in 2024  CMP ok GFR 49  Lipid 129/140/31/70  hgA1C 6.9 stable    Refused flu shot.   Refused Covid19 booster.   Got RSV shot per pt.   RTO in 6 months.        History of Present Illness     HPI    Pt is here by himself.       DM---2024 hgA1C 6.9 stable.   He is on meformin 500mg bid. Denies side effects.   He is on novolin 30u am and 25u pm.  He has FreeStyle sanjay. [facility] No more hypoglycemia episodes per pt.   Had foot neuropathy.   FU ophthalmology regularly.   FU podiatry. Had ingrown toenail last week.     HTN----He is on amlodipine 5mg QD and metoprolol 50mg QD.   Hx of CVA in 3/2017. Left leg feels weakness. Use walker which helped.    Afib---He is on coumadin. Cardiology follows PT/INR monthly.    S/p frankie 2017. FU cardiology from [facility] regularly.      Hyperlipidemia---Pt is on atorvastatin 40mg qhs. Denies side effects.       Review of Systems   Constitutional:  Negative for appetite change, chills  "and fever.   HENT:  Negative for congestion, ear pain, sinus pain and sore throat.    Eyes:  Negative for discharge and itching.   Respiratory:  Negative for apnea, cough, chest tightness, shortness of breath and wheezing.    Cardiovascular:  Negative for chest pain, palpitations and leg swelling.   Gastrointestinal:  Negative for abdominal pain, anal bleeding, constipation, diarrhea, nausea and vomiting.   Endocrine: Negative for cold intolerance, heat intolerance and polyuria.   Genitourinary:  Negative for difficulty urinating and dysuria.   Musculoskeletal:  Positive for gait problem. Negative for arthralgias, back pain and myalgias.   Skin:  Negative for rash.   Neurological:  Negative for dizziness and headaches.   Psychiatric/Behavioral:  Negative for agitation.           Objective   /76   Pulse 81   Temp 98 °F (36.7 °C) (Tympanic)   Resp 18   Ht 5' 3\" (1.6 m)   Wt 89.8 kg (198 lb)   SpO2 96%   BMI 35.07 kg/m²      Physical Exam  Constitutional:       Appearance: He is well-developed.   HENT:      Head: Normocephalic and atraumatic.   Eyes:      General:         Right eye: No discharge.         Left eye: No discharge.      Conjunctiva/sclera: Conjunctivae normal.   Cardiovascular:      Rate and Rhythm: Normal rate and regular rhythm.      Heart sounds: Normal heart sounds. No murmur heard.     No friction rub. No gallop.   Pulmonary:      Effort: Pulmonary effort is normal. No respiratory distress.      Breath sounds: Normal breath sounds. No wheezing or rales.   Abdominal:      General: Bowel sounds are normal. There is no distension.      Palpations: Abdomen is soft.      Tenderness: There is no abdominal tenderness. There is no guarding.   Musculoskeletal:      Cervical back: Normal range of motion and neck supple.      Comments: Use cane   Neurological:      Mental Status: He is alert.         "

## 2024-12-03 NOTE — ASSESSMENT & PLAN NOTE
Low fat diet. Continue atorvastatin 40mg qhs.   Orders:    CBC; Future    Comprehensive metabolic panel; Future    Hemoglobin A1C; Future    Lipid panel; Future

## 2024-12-03 NOTE — ASSESSMENT & PLAN NOTE
Lab Results   Component Value Date    HGBA1C 6.9 (H) 11/29/2024     Controlled. Low carb diet. Continue metformin 500mg bid and novolin 30u am/25u pm.   Orders:    CBC; Future    Comprehensive metabolic panel; Future    Hemoglobin A1C; Future    Lipid panel; Future

## 2024-12-20 DIAGNOSIS — E78.2 MIXED HYPERLIPIDEMIA: ICD-10-CM

## 2024-12-20 RX ORDER — ATORVASTATIN CALCIUM 40 MG/1
40 TABLET, FILM COATED ORAL DAILY
Qty: 30 TABLET | Refills: 5 | Status: SHIPPED | OUTPATIENT
Start: 2024-12-20

## 2025-01-04 DIAGNOSIS — E78.2 MIXED HYPERLIPIDEMIA: ICD-10-CM

## 2025-01-06 ENCOUNTER — TELEPHONE (OUTPATIENT)
Age: 85
End: 2025-01-06

## 2025-01-06 NOTE — TELEPHONE ENCOUNTER
Contacted the patient regarding his scheduled appointment on 01-, he preferred to cancel at this time.    He will call us back to reschedule.

## 2025-01-06 NOTE — TELEPHONE ENCOUNTER
Phone call from patient calling Nephrology to cancel Urology Appt for 1/7/25. Attempted to transfer patient to Urology POD (not avail). Provided patient with phone number to Urology, and informed patient that I would have someone from Urology call him. Patient agreeable.     Please contact patient (phone number in chart confirmed with patient).

## 2025-01-07 RX ORDER — ATORVASTATIN CALCIUM 40 MG/1
40 TABLET, FILM COATED ORAL DAILY
Qty: 90 TABLET | Refills: 1 | Status: SHIPPED | OUTPATIENT
Start: 2025-01-07

## 2025-01-07 RX ORDER — ATORVASTATIN CALCIUM 40 MG/1
40 TABLET, FILM COATED ORAL DAILY
Qty: 90 TABLET | Refills: 1 | Status: SHIPPED | OUTPATIENT
Start: 2025-01-07 | End: 2025-01-07 | Stop reason: SDUPTHER

## 2025-01-11 DIAGNOSIS — E11.42 TYPE 2 DIABETES MELLITUS WITH DIABETIC POLYNEUROPATHY, WITH LONG-TERM CURRENT USE OF INSULIN (HCC): ICD-10-CM

## 2025-01-11 DIAGNOSIS — Z79.4 TYPE 2 DIABETES MELLITUS WITH DIABETIC POLYNEUROPATHY, WITH LONG-TERM CURRENT USE OF INSULIN (HCC): ICD-10-CM

## 2025-01-13 RX ORDER — METFORMIN HYDROCHLORIDE 500 MG/1
500 TABLET, EXTENDED RELEASE ORAL 2 TIMES DAILY WITH MEALS
Qty: 180 TABLET | Refills: 1 | Status: SHIPPED | OUTPATIENT
Start: 2025-01-13

## 2025-01-23 LAB
CREAT ?TM UR-SCNC: 120.3 UMOL/L
MICROALBUMIN/CREAT UR: 450.5 MG/G{CREAT}

## 2025-02-24 DIAGNOSIS — Z79.4 TYPE 2 DIABETES MELLITUS WITH DIABETIC POLYNEUROPATHY, WITH LONG-TERM CURRENT USE OF INSULIN (HCC): ICD-10-CM

## 2025-02-24 DIAGNOSIS — E11.42 TYPE 2 DIABETES MELLITUS WITH DIABETIC POLYNEUROPATHY, WITH LONG-TERM CURRENT USE OF INSULIN (HCC): ICD-10-CM

## 2025-02-24 RX ORDER — METFORMIN HYDROCHLORIDE 500 MG/1
500 TABLET, EXTENDED RELEASE ORAL 2 TIMES DAILY WITH MEALS
Qty: 180 TABLET | Refills: 1 | Status: SHIPPED | OUTPATIENT
Start: 2025-02-24 | End: 2025-02-26 | Stop reason: SDUPTHER

## 2025-02-26 RX ORDER — METFORMIN HYDROCHLORIDE 500 MG/1
500 TABLET, EXTENDED RELEASE ORAL 2 TIMES DAILY WITH MEALS
Qty: 180 TABLET | Refills: 1 | Status: SHIPPED | OUTPATIENT
Start: 2025-02-26

## 2025-03-11 DIAGNOSIS — I10 BENIGN ESSENTIAL HYPERTENSION: ICD-10-CM

## 2025-03-11 RX ORDER — AMLODIPINE BESYLATE 5 MG/1
5 TABLET ORAL DAILY
Qty: 100 TABLET | Refills: 1 | Status: SHIPPED | OUTPATIENT
Start: 2025-03-11

## 2025-03-13 LAB
LEFT EYE DIABETIC RETINOPATHY: NORMAL
RIGHT EYE DIABETIC RETINOPATHY: NORMAL

## 2025-05-28 ENCOUNTER — OFFICE VISIT (OUTPATIENT)
Dept: FAMILY MEDICINE CLINIC | Facility: CLINIC | Age: 85
End: 2025-05-28
Payer: COMMERCIAL

## 2025-05-28 VITALS
BODY MASS INDEX: 35.9 KG/M2 | HEIGHT: 63 IN | HEART RATE: 78 BPM | DIASTOLIC BLOOD PRESSURE: 70 MMHG | SYSTOLIC BLOOD PRESSURE: 130 MMHG | OXYGEN SATURATION: 99 % | RESPIRATION RATE: 18 BRPM | TEMPERATURE: 98 F | WEIGHT: 202.6 LBS

## 2025-05-28 DIAGNOSIS — I10 PRIMARY HYPERTENSION: ICD-10-CM

## 2025-05-28 DIAGNOSIS — I48.91 ATRIAL FIBRILLATION, UNSPECIFIED TYPE (HCC): ICD-10-CM

## 2025-05-28 DIAGNOSIS — Z23 ENCOUNTER FOR IMMUNIZATION: ICD-10-CM

## 2025-05-28 DIAGNOSIS — N18.31 STAGE 3A CHRONIC KIDNEY DISEASE (HCC): ICD-10-CM

## 2025-05-28 DIAGNOSIS — E11.42 TYPE 2 DIABETES MELLITUS WITH DIABETIC POLYNEUROPATHY, WITH LONG-TERM CURRENT USE OF INSULIN (HCC): ICD-10-CM

## 2025-05-28 DIAGNOSIS — I49.5 TACHY-BRADY SYNDROME (HCC): ICD-10-CM

## 2025-05-28 DIAGNOSIS — Z79.4 TYPE 2 DIABETES MELLITUS WITH DIABETIC POLYNEUROPATHY, WITH LONG-TERM CURRENT USE OF INSULIN (HCC): ICD-10-CM

## 2025-05-28 DIAGNOSIS — I63.9 CEREBROVASCULAR ACCIDENT (CVA), UNSPECIFIED MECHANISM (HCC): ICD-10-CM

## 2025-05-28 DIAGNOSIS — M79.89 LEG SWELLING: Primary | ICD-10-CM

## 2025-05-28 PROCEDURE — G2211 COMPLEX E/M VISIT ADD ON: HCPCS | Performed by: FAMILY MEDICINE

## 2025-05-28 PROCEDURE — 90677 PCV20 VACCINE IM: CPT

## 2025-05-28 PROCEDURE — 99214 OFFICE O/P EST MOD 30 MIN: CPT | Performed by: FAMILY MEDICINE

## 2025-05-28 PROCEDURE — G0009 ADMIN PNEUMOCOCCAL VACCINE: HCPCS

## 2025-05-28 RX ORDER — FUROSEMIDE 20 MG/1
20 TABLET ORAL DAILY
Qty: 30 TABLET | Refills: 5 | Status: SHIPPED | OUTPATIENT
Start: 2025-05-28

## 2025-05-28 NOTE — PROGRESS NOTES
Name: Cirilo Bustamante      : 1940      MRN: [id number]  Encounter Provider: Rick Delacruz MD  Encounter Date: 2025   Encounter department: [facility]  :  Assessment & Plan  Leg swelling  Give lasix 20mg QD. SE educated pt.   Orders:    furosemide (LASIX) 20 mg tablet; Take 1 tablet (20 mg total) by mouth daily    Type 2 diabetes mellitus with diabetic polyneuropathy, with long-term current use of insulin (HCC)    Lab Results   Component Value Date    HGBA1C 6.9 (H) 2024     [facility]   Continue current meds.        Stage 3a chronic kidney disease (HCC)  Lab Results   Component Value Date    EGFR 49 (L) 2024    EGFR 50 (L) 2024    EGFR 40 (L) 10/10/2023    CREATININE 1.42 (H) 2024    CREATININE 1.40 (H) 2024    CREATININE 1.68 (H) 10/10/2023     Keep hydrated. Avoid motrin/ibuprofen/aleve NSAIDs nephrotoxic agents.          Tachy-brice syndrome (HCC)  FU cardiology.        Primary hypertension  DASH diet. Continue current meds.        Atrial fibrillation, unspecified type (HCC)  Continue coumadin per cardiology.        Cerebrovascular accident (CVA), unspecified mechanism (HCC)  Walk with a walker. Continue atorvastatin 40mg qhs.        Encounter for immunization    Orders:    Pneumococcal Conjugate Vaccine 20-valent (Pcv20)      Refused flu shot.   Refused Covid19 booster.  Give PCV20 today.    Got RSV shot per pt.   RTO in 6 months.             History of Present Illness   HPI    Pt is here by himself.      Left leg/foot swollen for 2 weeks.   No pain.   SOB stable.      DM---[facility]   He is on meformin 500mg bid. Denies side effects.   He is on novolin 30u am and 25u pm.  He has FreeStyle sanjay. [facility]. No more hypoglycemia episodes per pt.   Had foot neuropathy.   FU ophthalmology regularly.   FU podiatry in [facility].      HTN----He is on amlodipine 5mg QD and metoprolol 50mg QD.   Hx of CVA in  "3/2017. Left leg feels weakness. Use walker which helped.    Afib---He is on coumadin. Cardiology follows PT/INR monthly.  5/23/2025 INR 3 ok.   S/p packemaker 11/2017. FU cardiology from [facility] regularly.      Hyperlipidemia---Pt is on atorvastatin 40mg qhs. Denies side effects.     Lives by himself in [address] apartment. Does all ADL's. Driving.   Denies recent falls.   Denies depression.       Review of Systems   Constitutional:  Negative for appetite change, chills and fever.   HENT:  Negative for congestion, ear pain, sinus pain and sore throat.    Eyes:  Negative for discharge and itching.   Respiratory:  Negative for apnea, cough, chest tightness, shortness of breath and wheezing.    Cardiovascular:  Negative for chest pain, palpitations and leg swelling.   Gastrointestinal:  Negative for abdominal pain, anal bleeding, constipation, diarrhea, nausea and vomiting.   Endocrine: Negative for cold intolerance, heat intolerance and polyuria.   Genitourinary:  Negative for difficulty urinating and dysuria.   Musculoskeletal:  Positive for joint swelling. Negative for arthralgias, back pain and myalgias.   Skin:  Negative for rash.   Neurological:  Negative for dizziness and headaches.   Psychiatric/Behavioral:  Negative for agitation.        Objective   /70   Pulse 78   Temp 98 °F (36.7 °C) (Temporal)   Resp 18   Ht 5' 3\" (1.6 m)   Wt 91.9 kg (202 lb 9.6 oz)   SpO2 99%   BMI 35.89 kg/m²      Physical Exam  Constitutional:       Appearance: He is well-developed.   HENT:      Head: Normocephalic and atraumatic.     Eyes:      General:         Right eye: No discharge.         Left eye: No discharge.      Conjunctiva/sclera: Conjunctivae normal.       Cardiovascular:      Rate and Rhythm: Normal rate and regular rhythm.      Pulses: Pulses are weak.           Dorsalis pedis pulses are 1+ on the right side and 1+ on the left side.      Heart sounds: Normal heart sounds. No murmur heard.     No friction rub. No " gallop.   Pulmonary:      Effort: Pulmonary effort is normal. No respiratory distress.      Breath sounds: Normal breath sounds. No wheezing or rales.   Abdominal:      General: Bowel sounds are normal. There is no distension.      Palpations: Abdomen is soft.      Tenderness: There is no abdominal tenderness. There is no guarding.     Musculoskeletal:         General: No tenderness. Normal range of motion.      Cervical back: Normal range of motion and neck supple.      Right lower leg: Edema present.      Left lower leg: Edema present.   Feet:      Right foot:      Skin integrity: No ulcer, skin breakdown, erythema, warmth, callus or dry skin.      Left foot:      Skin integrity: No ulcer, skin breakdown, erythema, warmth, callus or dry skin.     Neurological:      Mental Status: He is alert.       Patient's shoes and socks removed.    Right Foot/Ankle   Right Foot Inspection  Skin Exam: skin normal and skin intact. No dry skin, no warmth, no callus, no erythema, no maceration, no abnormal color, no pre-ulcer, no ulcer and no callus.     Toe Exam: ROM and strength within normal limits.     Sensory   Monofilament testing: absent    Vascular  The right DP pulse is 1+.     Left Foot/Ankle  Left Foot Inspection  Skin Exam: skin normal and skin intact. No dry skin, no warmth, no erythema, no maceration, normal color, no pre-ulcer, no ulcer and no callus.     Toe Exam: ROM and strength within normal limits.     Sensory   Monofilament testing: absent    Vascular  The left DP pulse is 1+.     Assign Risk Category  No deformity present  Loss of protective sensation  Weak pulses  Risk: 2

## 2025-05-28 NOTE — ASSESSMENT & PLAN NOTE
Lab Results   Component Value Date    EGFR 49 (L) 11/29/2024    EGFR 50 (L) 09/17/2024    EGFR 40 (L) 10/10/2023    CREATININE 1.42 (H) 11/29/2024    CREATININE 1.40 (H) 09/17/2024    CREATININE 1.68 (H) 10/10/2023     Keep hydrated. Avoid motrin/ibuprofen/aleve NSAIDs nephrotoxic agents.

## 2025-05-28 NOTE — ASSESSMENT & PLAN NOTE
Lab Results   Component Value Date    HGBA1C 6.9 (H) 11/29/2024     [facility]   Continue current meds.

## 2025-05-29 ENCOUNTER — TELEPHONE (OUTPATIENT)
Dept: ADMINISTRATIVE | Facility: OTHER | Age: 85
End: 2025-05-29

## 2025-05-29 NOTE — LETTER
Diabetic Eye Exam Form    Date Requested: 25     Please complete form  Patient: Cirilo Bustamante  Patient : 1940   Referring Provider: Rick Delacruz MD      DIABETIC Eye Exam Date _______________________________      Type of Exam MUST be documented for Diabetic Eye Exams. Please CHECK ONE.     Retinal Exam       Dilated Retinal Exam       OCT       Optomap-Iris Exam      Fundus Photography       Left Eye - Please check Retinopathy or No Retinopathy        Exam did show retinopathy    Exam did not show retinopathy       Right Eye - Please check Retinopathy or No Retinopathy       Exam did show retinopathy    Exam did not show retinopathy       Comments __________________________________________________________    Practice Providing Exam ______________________________________________    Exam Performed By (print name) _______________________________________      Provider Signature ___________________________________________________      These reports are needed for  compliance.    Please fax this completed form and a copy of the Diabetic Eye Exam report to the [facility] Department as soon as possible via Fax [phone], attention Dwayne: Phone [phone]. Our office is located at [address] [place] [address].     We thank you for your assistance in treating our mutual patient.

## 2025-05-29 NOTE — TELEPHONE ENCOUNTER
Upon review of the In Basket request we were able to locate, review, and update the patient chart as requested for Microalbumin Creatinine Urine Ratio OR Albumin Creatinine Urine Ratio .    Any additional questions or concerns should be emailed to the Practice Liaisons via the appropriate education email address, please do not reply via In Basket.    Thank you  Dwayne Elias MA   [facility]

## 2025-05-29 NOTE — TELEPHONE ENCOUNTER
Upon review of the In Basket request and the patient's chart, initial outreach has been made via fax to facility. Please see Contacts section for details.     Thank you  Dwayne Elias MA

## 2025-05-29 NOTE — TELEPHONE ENCOUNTER
----- Message from [place] [facility] sent at 5/28/2025  1:40 PM EDT -----  Regarding: Care Gap Request  05/28/25 1:40 PMHello, our patient Cirilo Bustamante has had Diabetic Eye Exam completed/performed. Please assist in updating the patient chart by making an External outreach to [facility] located in [address] [place] [address] in [place]. The date of service is couple weeks ago.Thank you,PATITO Farah Marion SARTHAK [facility] [place]  Cc: Rick Delacruz MD  05/28/25 1:38 PM    [facility], our patient Cirilo Bustamante has had Urine Albumin/Creatinine Ratio completed/performed. Please assist in updating the patient chart by pulling the document from the Media Tab. The date of service is 1/28/25.    Thank you,  Verona Garner MA [facility]

## 2025-05-30 NOTE — TELEPHONE ENCOUNTER
Upon review of the In Basket request we were able to locate, review, and update the patient chart as requested for Diabetic Eye Exam.    Any additional questions or concerns should be emailed to the Practice Liaisons via the appropriate education email address, please do not reply via In Basket.    Thank you  Dwayne Elias MA   [facility]

## 2025-06-12 DIAGNOSIS — E78.2 MIXED HYPERLIPIDEMIA: ICD-10-CM

## 2025-06-12 DIAGNOSIS — M79.89 LEG SWELLING: ICD-10-CM

## 2025-06-12 NOTE — TELEPHONE ENCOUNTER
Medication:  furosemide (LASIX) 20 mg tablet    Dose/Frequency:   Take 1 tablet (20 mg total) by mouth daily        Quantity: 30    Pharmacy: : [facility] - [address]     Office:   [x] PCP/Provider -   [] Speciality/Provider -     Does the patient have enough for 3 days?   [x] Yes   [] No - Send as HP to POD    Medication: atorvastatin (LIPITOR) 40 mg tablet    Dose/Frequency:   Take 1 tablet (40 mg total) by mouth daily        Quantity: 90    Pharmacy:    [facility] - [address]     Office:   [x] PCP/Provider -   [] Speciality/Provider -     Does the patient have enough for 3 days?   [x] Yes   [] No - Send as HP to POD

## 2025-06-13 RX ORDER — FUROSEMIDE 20 MG/1
20 TABLET ORAL DAILY
Qty: 30 TABLET | Refills: 5 | Status: SHIPPED | OUTPATIENT
Start: 2025-06-13

## 2025-06-13 RX ORDER — ATORVASTATIN CALCIUM 40 MG/1
40 TABLET, FILM COATED ORAL DAILY
Qty: 90 TABLET | Refills: 1 | Status: SHIPPED | OUTPATIENT
Start: 2025-06-13

## 2025-07-07 DIAGNOSIS — E11.42 TYPE 2 DIABETES MELLITUS WITH DIABETIC POLYNEUROPATHY, WITH LONG-TERM CURRENT USE OF INSULIN (HCC): ICD-10-CM

## 2025-07-07 DIAGNOSIS — Z79.4 TYPE 2 DIABETES MELLITUS WITH DIABETIC POLYNEUROPATHY, WITH LONG-TERM CURRENT USE OF INSULIN (HCC): ICD-10-CM

## 2025-07-08 ENCOUNTER — TELEPHONE (OUTPATIENT)
Age: 85
End: 2025-07-08

## 2025-07-08 NOTE — TELEPHONE ENCOUNTER
Patient call because his leg are swollen still and the doctor prescribe water pills and would like to know if he should take two a day due to his leg swollen  is not going. Please reach out to the patient if he can up the dose. Thank you

## 2025-07-09 NOTE — TELEPHONE ENCOUNTER
Patient calling for update, relayed providers message. Patient does not have any worsening shortness of breath or weight gain. States he is not urinating more frequently as he expected, just the same.

## 2025-07-09 NOTE — TELEPHONE ENCOUNTER
He may increase lasix to 2 tabs daily and use compression stocking. If legs swollen still no improving, let me know.

## 2025-07-10 ENCOUNTER — TELEPHONE (OUTPATIENT)
Dept: FAMILY MEDICINE CLINIC | Facility: CLINIC | Age: 85
End: 2025-07-10

## 2025-07-10 DIAGNOSIS — M79.89 LEG SWELLING: ICD-10-CM

## 2025-07-10 RX ORDER — FUROSEMIDE 20 MG/1
40 TABLET ORAL DAILY
Qty: 180 TABLET | Refills: 0 | Status: SHIPPED | OUTPATIENT
Start: 2025-07-10

## 2025-08-04 DIAGNOSIS — Z79.4 TYPE 2 DIABETES MELLITUS WITH DIABETIC POLYNEUROPATHY, WITH LONG-TERM CURRENT USE OF INSULIN (HCC): ICD-10-CM

## 2025-08-04 DIAGNOSIS — E11.42 TYPE 2 DIABETES MELLITUS WITH DIABETIC POLYNEUROPATHY, WITH LONG-TERM CURRENT USE OF INSULIN (HCC): ICD-10-CM

## 2025-08-05 RX ORDER — METFORMIN HYDROCHLORIDE 500 MG/1
500 TABLET, EXTENDED RELEASE ORAL 2 TIMES DAILY WITH MEALS
Qty: 180 TABLET | Refills: 0 | Status: SHIPPED | OUTPATIENT
Start: 2025-08-05

## 2025-08-15 ENCOUNTER — OFFICE VISIT (OUTPATIENT)
Dept: FAMILY MEDICINE CLINIC | Facility: CLINIC | Age: 85
End: 2025-08-15
Payer: COMMERCIAL

## 2025-08-15 PROBLEM — M79.89 LEFT LEG SWELLING: Status: ACTIVE | Noted: 2025-08-15

## 2025-08-18 ENCOUNTER — RESULTS FOLLOW-UP (OUTPATIENT)
Dept: FAMILY MEDICINE CLINIC | Facility: CLINIC | Age: 85
End: 2025-08-18

## 2025-08-18 ENCOUNTER — HOSPITAL ENCOUNTER (OUTPATIENT)
Dept: NON INVASIVE DIAGNOSTICS | Facility: HOSPITAL | Age: 85
Discharge: HOME | End: 2025-08-18
Attending: FAMILY MEDICINE
Payer: COMMERCIAL

## 2025-08-18 ENCOUNTER — APPOINTMENT (OUTPATIENT)
Dept: LAB | Facility: CLINIC | Age: 85
End: 2025-08-18
Attending: FAMILY MEDICINE
Payer: COMMERCIAL

## 2025-08-18 DIAGNOSIS — M79.89 LEFT LEG SWELLING: ICD-10-CM

## 2025-08-18 DIAGNOSIS — M79.605 PAIN OF LEFT LOWER EXTREMITY: ICD-10-CM

## 2025-08-18 DIAGNOSIS — I73.9 PAD (PERIPHERAL ARTERY DISEASE) (HCC): ICD-10-CM

## 2025-08-18 DIAGNOSIS — I25.10 CORONARY ARTERY DISEASE INVOLVING NATIVE CORONARY ARTERY OF NATIVE HEART WITHOUT ANGINA PECTORIS: ICD-10-CM

## 2025-08-18 DIAGNOSIS — M71.22 SYNOVIAL CYST OF LEFT POPLITEAL SPACE: Primary | ICD-10-CM

## 2025-08-18 LAB
ANION GAP SERPL CALCULATED.3IONS-SCNC: 10 MMOL/L (ref 4–13)
BNP SERPL-MCNC: 497 PG/ML (ref 0–100)
BUN SERPL-MCNC: 27 MG/DL (ref 5–25)
CALCIUM SERPL-MCNC: 10.1 MG/DL (ref 8.4–10.2)
CHLORIDE SERPL-SCNC: 102 MMOL/L (ref 96–108)
CO2 SERPL-SCNC: 30 MMOL/L (ref 21–32)
CREAT SERPL-MCNC: 1.44 MG/DL (ref 0.6–1.3)
GFR SERPL CREATININE-BSD FRML MDRD: 44 ML/MIN/1.73SQ M
GLUCOSE P FAST SERPL-MCNC: 45 MG/DL (ref 65–99)
POTASSIUM SERPL-SCNC: 4.3 MMOL/L (ref 3.5–5.3)
SODIUM SERPL-SCNC: 142 MMOL/L (ref 135–147)

## 2025-08-18 PROCEDURE — 36415 COLL VENOUS BLD VENIPUNCTURE: CPT

## 2025-08-18 PROCEDURE — 93971 EXTREMITY STUDY: CPT

## 2025-08-18 PROCEDURE — 83880 ASSAY OF NATRIURETIC PEPTIDE: CPT

## 2025-08-18 PROCEDURE — 93971 EXTREMITY STUDY: CPT | Performed by: SURGERY

## 2025-08-18 PROCEDURE — 80048 BASIC METABOLIC PNL TOTAL CA: CPT
